# Patient Record
Sex: FEMALE | Race: WHITE | Employment: OTHER | URBAN - METROPOLITAN AREA
[De-identification: names, ages, dates, MRNs, and addresses within clinical notes are randomized per-mention and may not be internally consistent; named-entity substitution may affect disease eponyms.]

---

## 2017-04-07 ENCOUNTER — HOSPITAL ENCOUNTER (OUTPATIENT)
Age: 47
Setting detail: SPECIMEN
Discharge: HOME | End: 2017-04-07
Payer: MEDICARE

## 2017-04-07 LAB
-: ABNORMAL
ABSOLUTE EOS #: 0.3 K/UL (ref 0–0.4)
ABSOLUTE LYMPH #: 2 K/UL (ref 1–4.8)
ABSOLUTE MONO #: 0.3 K/UL (ref 0.1–1.2)
ALBUMIN SERPL-MCNC: 3.9 G/DL (ref 3.5–5.2)
ALBUMIN/GLOBULIN RATIO: 1.1 (ref 1–2.5)
ALP BLD-CCNC: 133 U/L (ref 35–104)
ALT SERPL-CCNC: 31 U/L (ref 5–33)
AMORPHOUS: ABNORMAL
ANION GAP SERPL CALCULATED.3IONS-SCNC: 13 MMOL/L (ref 9–17)
AST SERPL-CCNC: 27 U/L
BACTERIA: ABNORMAL
BASOPHILS # BLD: 1 % (ref 0–2)
BASOPHILS ABSOLUTE: 0 K/UL (ref 0–0.2)
BILIRUB SERPL-MCNC: 0.27 MG/DL (ref 0.3–1.2)
BILIRUBIN URINE: NEGATIVE
BUN BLDV-MCNC: 12 MG/DL (ref 6–20)
BUN/CREAT BLD: ABNORMAL (ref 9–20)
CALCIUM SERPL-MCNC: 8.8 MG/DL (ref 8.6–10.4)
CASTS UA: ABNORMAL /LPF (ref 0–2)
CHLORIDE BLD-SCNC: 102 MMOL/L (ref 98–107)
CHOLESTEROL/HDL RATIO: 4.8
CHOLESTEROL: 186 MG/DL
CO2: 24 MMOL/L (ref 20–31)
COLOR: YELLOW
COMMENT UA: ABNORMAL
CREAT SERPL-MCNC: 0.84 MG/DL (ref 0.5–0.9)
CREATININE URINE: 160.1 MG/DL (ref 28–217)
CRYSTALS, UA: ABNORMAL /HPF
DIFFERENTIAL TYPE: ABNORMAL
EOSINOPHILS RELATIVE PERCENT: 5 % (ref 1–4)
EPITHELIAL CELLS UA: ABNORMAL /HPF (ref 0–5)
GFR AFRICAN AMERICAN: >60 ML/MIN
GFR NON-AFRICAN AMERICAN: >60 ML/MIN
GFR SERPL CREATININE-BSD FRML MDRD: ABNORMAL ML/MIN/{1.73_M2}
GFR SERPL CREATININE-BSD FRML MDRD: ABNORMAL ML/MIN/{1.73_M2}
GLUCOSE BLD-MCNC: 113 MG/DL (ref 70–99)
GLUCOSE URINE: NEGATIVE
HCT VFR BLD CALC: 33.8 % (ref 36–46)
HDLC SERPL-MCNC: 39 MG/DL
HEMOGLOBIN: 11.3 G/DL (ref 12–16)
KETONES, URINE: NEGATIVE
LDL CHOLESTEROL: 109 MG/DL (ref 0–130)
LEUKOCYTE ESTERASE, URINE: ABNORMAL
LYMPHOCYTES # BLD: 31 % (ref 24–44)
MCH RBC QN AUTO: 28.2 PG (ref 26–34)
MCHC RBC AUTO-ENTMCNC: 33.3 G/DL (ref 31–37)
MCV RBC AUTO: 84.7 FL (ref 80–100)
MICROALBUMIN/CREAT 24H UR: 12 MG/L
MICROALBUMIN/CREAT UR-RTO: 7 MCG/MG CREAT
MONOCYTES # BLD: 5 % (ref 2–11)
MUCUS: ABNORMAL
NITRITE, URINE: NEGATIVE
OTHER OBSERVATIONS UA: ABNORMAL
PDW BLD-RTO: 14.2 % (ref 12.5–15.4)
PH UA: 5.5 (ref 5–8)
PLATELET # BLD: 310 K/UL (ref 140–450)
PLATELET ESTIMATE: ABNORMAL
PMV BLD AUTO: 7.7 FL (ref 6–12)
POTASSIUM SERPL-SCNC: 3.9 MMOL/L (ref 3.7–5.3)
PROTEIN UA: NEGATIVE
RBC # BLD: 4 M/UL (ref 4–5.2)
RBC # BLD: ABNORMAL 10*6/UL
RBC UA: ABNORMAL /HPF (ref 0–2)
RENAL EPITHELIAL, UA: ABNORMAL /HPF
SEG NEUTROPHILS: 58 % (ref 36–66)
SEGMENTED NEUTROPHILS ABSOLUTE COUNT: 3.8 K/UL (ref 1.8–7.7)
SODIUM BLD-SCNC: 139 MMOL/L (ref 135–144)
SPECIFIC GRAVITY UA: 1.02 (ref 1–1.03)
T3 FREE: 2.66 PG/ML (ref 2.02–4.43)
THYROXINE, FREE: 0.98 NG/DL (ref 0.93–1.7)
TOTAL PROTEIN: 7.3 G/DL (ref 6.4–8.3)
TRICHOMONAS: ABNORMAL
TRIGL SERPL-MCNC: 191 MG/DL
TSH SERPL DL<=0.05 MIU/L-ACNC: 5.69 MIU/L (ref 0.3–5)
TURBIDITY: ABNORMAL
URINE HGB: NEGATIVE
UROBILINOGEN, URINE: NORMAL
VLDLC SERPL CALC-MCNC: ABNORMAL MG/DL (ref 1–30)
WBC # BLD: 6.5 K/UL (ref 3.5–11)
WBC # BLD: ABNORMAL 10*3/UL
WBC UA: ABNORMAL /HPF (ref 0–5)
YEAST: ABNORMAL

## 2017-04-07 PROCEDURE — 83036 HEMOGLOBIN GLYCOSYLATED A1C: CPT

## 2017-04-07 PROCEDURE — 84481 FREE ASSAY (FT-3): CPT

## 2017-04-07 PROCEDURE — 84439 ASSAY OF FREE THYROXINE: CPT

## 2017-04-07 PROCEDURE — 85025 COMPLETE CBC W/AUTO DIFF WBC: CPT

## 2017-04-07 PROCEDURE — 82043 UR ALBUMIN QUANTITATIVE: CPT

## 2017-04-07 PROCEDURE — 80053 COMPREHEN METABOLIC PANEL: CPT

## 2017-04-07 PROCEDURE — 84443 ASSAY THYROID STIM HORMONE: CPT

## 2017-04-07 PROCEDURE — 82570 ASSAY OF URINE CREATININE: CPT

## 2017-04-07 PROCEDURE — 81001 URINALYSIS AUTO W/SCOPE: CPT

## 2017-04-07 PROCEDURE — 36415 COLL VENOUS BLD VENIPUNCTURE: CPT

## 2017-04-07 PROCEDURE — 80061 LIPID PANEL: CPT

## 2017-04-10 LAB
ESTIMATED AVERAGE GLUCOSE: 163 MG/DL
HBA1C MFR BLD: 7.3 % (ref 4–6)

## 2017-05-22 ENCOUNTER — APPOINTMENT (OUTPATIENT)
Dept: GENERAL RADIOLOGY | Age: 47
End: 2017-05-22
Payer: MEDICARE

## 2017-05-22 ENCOUNTER — HOSPITAL ENCOUNTER (EMERGENCY)
Age: 47
Discharge: HOME | End: 2017-05-22
Attending: EMERGENCY MEDICINE
Payer: MEDICARE

## 2017-05-22 VITALS
RESPIRATION RATE: 18 BRPM | HEART RATE: 92 BPM | OXYGEN SATURATION: 96 % | SYSTOLIC BLOOD PRESSURE: 127 MMHG | TEMPERATURE: 97 F | DIASTOLIC BLOOD PRESSURE: 85 MMHG

## 2017-05-22 DIAGNOSIS — R11.2 NON-INTRACTABLE VOMITING WITH NAUSEA, UNSPECIFIED VOMITING TYPE: Primary | ICD-10-CM

## 2017-05-22 DIAGNOSIS — R19.7 DIARRHEA, UNSPECIFIED TYPE: ICD-10-CM

## 2017-05-22 LAB
-: ABNORMAL
AMORPHOUS: ABNORMAL
BACTERIA: ABNORMAL
BILIRUBIN URINE: NEGATIVE
CASTS UA: ABNORMAL /LPF (ref 0–2)
CHP ED QC CHECK: YES
COLOR: ABNORMAL
COMMENT UA: ABNORMAL
CRYSTALS, UA: ABNORMAL /HPF
EPITHELIAL CELLS UA: ABNORMAL /HPF (ref 0–5)
GLUCOSE BLD-MCNC: 126 MG/DL
GLUCOSE BLD-MCNC: 126 MG/DL (ref 65–105)
GLUCOSE URINE: NEGATIVE
KETONES, URINE: NEGATIVE
LEUKOCYTE ESTERASE, URINE: ABNORMAL
MUCUS: ABNORMAL
NITRITE, URINE: NEGATIVE
OTHER OBSERVATIONS UA: ABNORMAL
PH UA: 5.5 (ref 5–8)
PROTEIN UA: ABNORMAL
RBC UA: ABNORMAL /HPF (ref 0–2)
RENAL EPITHELIAL, UA: ABNORMAL /HPF
SPECIFIC GRAVITY UA: 1.02 (ref 1–1.03)
TRICHOMONAS: ABNORMAL
TURBIDITY: ABNORMAL
URINE HGB: NEGATIVE
UROBILINOGEN, URINE: NORMAL
WBC UA: ABNORMAL /HPF (ref 0–5)
YEAST: ABNORMAL

## 2017-05-22 PROCEDURE — 81001 URINALYSIS AUTO W/SCOPE: CPT

## 2017-05-22 PROCEDURE — 71020 XR CHEST STANDARD TWO VW: CPT

## 2017-05-22 PROCEDURE — 87086 URINE CULTURE/COLONY COUNT: CPT

## 2017-05-22 PROCEDURE — 6360000002 HC RX W HCPCS: Performed by: STUDENT IN AN ORGANIZED HEALTH CARE EDUCATION/TRAINING PROGRAM

## 2017-05-22 PROCEDURE — 99284 EMERGENCY DEPT VISIT MOD MDM: CPT

## 2017-05-22 PROCEDURE — 82947 ASSAY GLUCOSE BLOOD QUANT: CPT

## 2017-05-22 RX ORDER — MELOXICAM 15 MG/1
15 TABLET ORAL DAILY
Status: ON HOLD | COMMUNITY
End: 2022-09-22 | Stop reason: HOSPADM

## 2017-05-22 RX ORDER — IBUPROFEN 800 MG/1
800 TABLET ORAL EVERY 6 HOURS PRN
COMMUNITY
End: 2022-07-13

## 2017-05-22 RX ORDER — ONDANSETRON 4 MG/1
4 TABLET, FILM COATED ORAL ONCE
Status: COMPLETED | OUTPATIENT
Start: 2017-05-22 | End: 2017-05-22

## 2017-05-22 RX ORDER — ONDANSETRON 4 MG/1
4 TABLET, FILM COATED ORAL EVERY 8 HOURS PRN
Qty: 8 TABLET | Refills: 0 | Status: SHIPPED | OUTPATIENT
Start: 2017-05-22 | End: 2022-10-21 | Stop reason: ALTCHOICE

## 2017-05-22 RX ORDER — SIMVASTATIN 40 MG
40 TABLET ORAL NIGHTLY
COMMUNITY

## 2017-05-22 RX ORDER — ACETAMINOPHEN 325 MG/1
650 TABLET ORAL EVERY 6 HOURS PRN
COMMUNITY

## 2017-05-22 RX ADMIN — ONDANSETRON 4 MG: 4 TABLET, FILM COATED ORAL at 10:35

## 2017-05-22 ASSESSMENT — ENCOUNTER SYMPTOMS
NAUSEA: 1
BLOOD IN STOOL: 0
VOMITING: 1
BACK PAIN: 0
SORE THROAT: 1
RHINORRHEA: 0
DIARRHEA: 1
ABDOMINAL DISTENTION: 0
WHEEZING: 0
PHOTOPHOBIA: 0
ABDOMINAL PAIN: 0
COUGH: 1
SHORTNESS OF BREATH: 0

## 2017-05-22 ASSESSMENT — PAIN DESCRIPTION - ORIENTATION: ORIENTATION: RIGHT

## 2017-05-22 ASSESSMENT — PAIN SCALES - GENERAL: PAINLEVEL_OUTOF10: 5

## 2017-05-22 ASSESSMENT — PAIN DESCRIPTION - LOCATION: LOCATION: ABDOMEN

## 2017-05-23 LAB
CULTURE: NORMAL
CULTURE: NORMAL
Lab: NORMAL
SPECIMEN DESCRIPTION: NORMAL
STATUS: NORMAL

## 2017-06-06 ENCOUNTER — HOSPITAL ENCOUNTER (OUTPATIENT)
Dept: PHYSICAL THERAPY | Age: 47
Setting detail: THERAPIES SERIES
Discharge: HOME | End: 2017-06-06
Payer: MEDICARE

## 2017-06-06 PROCEDURE — G8984 CARRY CURRENT STATUS: HCPCS

## 2017-06-06 PROCEDURE — 97162 PT EVAL MOD COMPLEX 30 MIN: CPT

## 2017-06-06 PROCEDURE — G8985 CARRY GOAL STATUS: HCPCS

## 2017-06-21 ENCOUNTER — HOSPITAL ENCOUNTER (OUTPATIENT)
Dept: PHYSICAL THERAPY | Age: 47
Setting detail: THERAPIES SERIES
Discharge: HOME | End: 2017-06-21
Payer: MEDICARE

## 2017-06-21 PROCEDURE — 97110 THERAPEUTIC EXERCISES: CPT

## 2017-06-21 PROCEDURE — 97140 MANUAL THERAPY 1/> REGIONS: CPT

## 2017-06-23 ENCOUNTER — HOSPITAL ENCOUNTER (OUTPATIENT)
Dept: PHYSICAL THERAPY | Age: 47
Setting detail: THERAPIES SERIES
Discharge: HOME | End: 2017-06-23
Payer: MEDICARE

## 2017-06-23 PROCEDURE — 97110 THERAPEUTIC EXERCISES: CPT

## 2017-06-23 PROCEDURE — 97140 MANUAL THERAPY 1/> REGIONS: CPT

## 2017-06-28 ENCOUNTER — HOSPITAL ENCOUNTER (OUTPATIENT)
Dept: PHYSICAL THERAPY | Age: 47
Setting detail: THERAPIES SERIES
Discharge: HOME | End: 2017-06-28
Payer: MEDICARE

## 2017-06-28 PROCEDURE — 97110 THERAPEUTIC EXERCISES: CPT

## 2017-06-28 PROCEDURE — 97140 MANUAL THERAPY 1/> REGIONS: CPT

## 2017-06-30 ENCOUNTER — HOSPITAL ENCOUNTER (OUTPATIENT)
Dept: PHYSICAL THERAPY | Age: 47
Setting detail: THERAPIES SERIES
Discharge: HOME | End: 2017-06-30
Payer: MEDICARE

## 2017-06-30 PROCEDURE — 97140 MANUAL THERAPY 1/> REGIONS: CPT

## 2017-06-30 PROCEDURE — 97110 THERAPEUTIC EXERCISES: CPT

## 2017-07-06 ENCOUNTER — HOSPITAL ENCOUNTER (OUTPATIENT)
Dept: PHYSICAL THERAPY | Age: 47
Setting detail: THERAPIES SERIES
Discharge: HOME | End: 2017-07-06
Payer: MEDICARE

## 2017-07-06 PROCEDURE — 97140 MANUAL THERAPY 1/> REGIONS: CPT

## 2017-07-06 PROCEDURE — 97110 THERAPEUTIC EXERCISES: CPT

## 2017-07-11 ENCOUNTER — HOSPITAL ENCOUNTER (OUTPATIENT)
Dept: PHYSICAL THERAPY | Age: 47
Setting detail: THERAPIES SERIES
Discharge: HOME | End: 2017-07-11
Payer: MEDICARE

## 2017-07-13 ENCOUNTER — HOSPITAL ENCOUNTER (OUTPATIENT)
Dept: PHYSICAL THERAPY | Age: 47
Setting detail: THERAPIES SERIES
Discharge: HOME | End: 2017-07-13
Payer: MEDICARE

## 2017-07-18 ENCOUNTER — HOSPITAL ENCOUNTER (OUTPATIENT)
Dept: PHYSICAL THERAPY | Age: 47
Setting detail: THERAPIES SERIES
Discharge: HOME | End: 2017-07-18
Payer: MEDICARE

## 2017-07-18 PROCEDURE — 97110 THERAPEUTIC EXERCISES: CPT

## 2017-07-18 PROCEDURE — 97140 MANUAL THERAPY 1/> REGIONS: CPT

## 2017-07-20 ENCOUNTER — HOSPITAL ENCOUNTER (OUTPATIENT)
Dept: PHYSICAL THERAPY | Age: 47
Setting detail: THERAPIES SERIES
Discharge: HOME | End: 2017-07-20
Payer: MEDICARE

## 2017-07-20 PROCEDURE — 97110 THERAPEUTIC EXERCISES: CPT

## 2017-07-20 PROCEDURE — 97140 MANUAL THERAPY 1/> REGIONS: CPT

## 2017-07-25 ENCOUNTER — HOSPITAL ENCOUNTER (OUTPATIENT)
Dept: PHYSICAL THERAPY | Age: 47
Setting detail: THERAPIES SERIES
Discharge: HOME | End: 2017-07-25
Payer: MEDICARE

## 2017-07-27 ENCOUNTER — HOSPITAL ENCOUNTER (OUTPATIENT)
Dept: PHYSICAL THERAPY | Age: 47
Setting detail: THERAPIES SERIES
Discharge: HOME | End: 2017-07-27
Payer: MEDICARE

## 2017-08-01 ENCOUNTER — HOSPITAL ENCOUNTER (OUTPATIENT)
Age: 47
Discharge: HOME | End: 2017-08-01
Payer: MEDICARE

## 2017-08-01 ENCOUNTER — HOSPITAL ENCOUNTER (OUTPATIENT)
Dept: GENERAL RADIOLOGY | Age: 47
Discharge: HOME | End: 2017-08-01
Payer: MEDICARE

## 2017-08-01 ENCOUNTER — HOSPITAL ENCOUNTER (OUTPATIENT)
Dept: PHYSICAL THERAPY | Age: 47
Setting detail: THERAPIES SERIES
Discharge: HOME | End: 2017-08-01
Payer: MEDICARE

## 2017-08-01 DIAGNOSIS — M25.512 CHRONIC LEFT SHOULDER PAIN: ICD-10-CM

## 2017-08-01 DIAGNOSIS — G89.29 CHRONIC LEFT SHOULDER PAIN: ICD-10-CM

## 2017-08-01 PROCEDURE — 97110 THERAPEUTIC EXERCISES: CPT

## 2017-08-01 PROCEDURE — 73030 X-RAY EXAM OF SHOULDER: CPT

## 2017-08-01 PROCEDURE — 97140 MANUAL THERAPY 1/> REGIONS: CPT

## 2017-08-03 ENCOUNTER — HOSPITAL ENCOUNTER (OUTPATIENT)
Dept: PHYSICAL THERAPY | Age: 47
Setting detail: THERAPIES SERIES
Discharge: HOME | End: 2017-08-03
Payer: MEDICARE

## 2017-08-04 ENCOUNTER — HOSPITAL ENCOUNTER (OUTPATIENT)
Dept: PHYSICAL THERAPY | Age: 47
Setting detail: THERAPIES SERIES
Discharge: HOME | End: 2017-08-04
Payer: MEDICARE

## 2017-08-04 PROCEDURE — 97110 THERAPEUTIC EXERCISES: CPT

## 2017-08-04 PROCEDURE — 97140 MANUAL THERAPY 1/> REGIONS: CPT

## 2017-08-08 ENCOUNTER — HOSPITAL ENCOUNTER (OUTPATIENT)
Dept: PHYSICAL THERAPY | Age: 47
Setting detail: THERAPIES SERIES
Discharge: HOME | End: 2017-08-08
Payer: MEDICARE

## 2017-08-10 ENCOUNTER — HOSPITAL ENCOUNTER (OUTPATIENT)
Dept: PHYSICAL THERAPY | Age: 47
Setting detail: THERAPIES SERIES
Discharge: HOME | End: 2017-08-10
Payer: MEDICARE

## 2017-08-15 ENCOUNTER — APPOINTMENT (OUTPATIENT)
Dept: PHYSICAL THERAPY | Age: 47
End: 2017-08-15
Payer: MEDICARE

## 2017-11-03 ENCOUNTER — APPOINTMENT (OUTPATIENT)
Dept: GENERAL RADIOLOGY | Age: 47
End: 2017-11-03
Payer: COMMERCIAL

## 2017-11-03 ENCOUNTER — HOSPITAL ENCOUNTER (EMERGENCY)
Age: 47
Discharge: HOME | End: 2017-11-03
Attending: EMERGENCY MEDICINE
Payer: COMMERCIAL

## 2017-11-03 VITALS
BODY MASS INDEX: 44.96 KG/M2 | RESPIRATION RATE: 18 BRPM | HEIGHT: 59 IN | HEART RATE: 108 BPM | OXYGEN SATURATION: 98 % | SYSTOLIC BLOOD PRESSURE: 118 MMHG | DIASTOLIC BLOOD PRESSURE: 81 MMHG | TEMPERATURE: 97.9 F | WEIGHT: 223 LBS

## 2017-11-03 DIAGNOSIS — T18.5XXA FOREIGN BODY OF RECTUM, INITIAL ENCOUNTER: Primary | ICD-10-CM

## 2017-11-03 DIAGNOSIS — K59.09 OTHER CONSTIPATION: ICD-10-CM

## 2017-11-03 PROCEDURE — 6370000000 HC RX 637 (ALT 250 FOR IP): Performed by: NURSE PRACTITIONER

## 2017-11-03 PROCEDURE — 74000 XR ABDOMEN LIMITED (KUB): CPT

## 2017-11-03 PROCEDURE — 99283 EMERGENCY DEPT VISIT LOW MDM: CPT

## 2017-11-03 RX ORDER — SODIUM PHOSPHATE, DIBASIC AND SODIUM PHOSPHATE, MONOBASIC 7; 19 G/133ML; G/133ML
1 ENEMA RECTAL ONCE
Status: COMPLETED | OUTPATIENT
Start: 2017-11-03 | End: 2017-11-03

## 2017-11-03 RX ORDER — DOCUSATE SODIUM 100 MG/1
100 CAPSULE, LIQUID FILLED ORAL 2 TIMES DAILY
Qty: 14 CAPSULE | Refills: 0 | Status: SHIPPED | OUTPATIENT
Start: 2017-11-03

## 2017-11-03 RX ORDER — HYDROCODONE BITARTRATE AND ACETAMINOPHEN 5; 325 MG/1; MG/1
2 TABLET ORAL ONCE
Status: COMPLETED | OUTPATIENT
Start: 2017-11-03 | End: 2017-11-03

## 2017-11-03 RX ADMIN — HYDROCODONE BITARTRATE AND ACETAMINOPHEN 2 TABLET: 5; 325 TABLET ORAL at 14:56

## 2017-11-03 RX ADMIN — SODIUM PHOSPHATE 1 ENEMA: 7; 19 ENEMA RECTAL at 14:56

## 2017-11-03 ASSESSMENT — PAIN SCALES - GENERAL
PAINLEVEL_OUTOF10: 5
PAINLEVEL_OUTOF10: 10

## 2017-11-03 ASSESSMENT — ENCOUNTER SYMPTOMS
NAUSEA: 0
ABDOMINAL PAIN: 0
CONSTIPATION: 1
VOMITING: 0

## 2017-11-03 NOTE — ED PROVIDER NOTES
[facility]  Emergency Department Encounter  Mid Level Provider     Pt Name: Candace He  MRN: [id number]  [place] 1970  Date of evaluation: 11/3/17  PCP:  Mile Bonner MD    CHIEF COMPLAINT       Chief Complaint   Patient presents with    Constipation     normal stool x2 days       HISTORY OF PRESENT ILLNESS  (Location/Symptom, Timing/Onset, Context/Setting, Quality, Duration, Modifying Factors, Severity.)      Candace He is a 49 y.o. female who presents with Report of constipation. Patient reports she ate a bowl of pumpkin seeds 2 days ago and now are stuck in her rectum. Patient reports she cannot pass normal stool due to this. Patient complains of rectal pain. Patient denies any abdominal pain. Patient has any fever or chills. PAST MEDICAL / SURGICAL / SOCIAL / FAMILY HISTORY      has a past medical history of Arthritis; Diabetes mellitus ([address] Hypertension; and Obese.     has a past surgical history that includes hernia repair and Tubal ligation. Social History     Social History    Marital status: Single     Spouse name: N/A    Number of children: N/A    Years of education: N/A     Occupational History    Not on file. Social History Main Topics    Smoking status: Former Smoker    Smokeless tobacco: Never Used    Alcohol use No    Drug use: No    Sexual activity: Not on file     Other Topics Concern    Not on file     Social History Narrative    No narrative on file       History reviewed. No pertinent family history. Allergies:  Codeine and Percocet [oxycodone-acetaminophen]    Home Medications:  Prior to Admission medications    Medication Sig Start Date End Date Taking?  Authorizing Provider   docusate sodium (COLACE) 100 MG capsule Take 1 capsule by mouth 2 times daily 11/3/17  Yes Dena Schmitz CNP   simvastatin (ZOCOR) 40 MG tablet Take 40 mg by mouth nightly   Yes Historical Provider, MD   meloxicam (MOBIC) 15 MG tablet Take 15 mg by mouth She is oriented to person, place, and time. She appears well-developed and well-nourished. No distress. Very obese   HENT:   Head: Normocephalic and atraumatic. Eyes: Conjunctivae are normal. Pupils are equal, round, and reactive to light. Neck: Normal range of motion. Neck supple. Cardiovascular: Normal rate and regular rhythm. Pulmonary/Chest: Effort normal. No respiratory distress. Abdominal: Soft. She exhibits no distension and no mass (no pulstile masses palpated). There is no tenderness. There is no rebound and no guarding. Neurological: She is alert and oriented to person, place, and time. Skin: Skin is warm and dry. Psychiatric: She has a normal mood and affect. Her behavior is normal. Judgment and thought content normal.   Nursing note and vitals reviewed. DIFFERENTIAL  DIAGNOSIS   Pumpkin seed impaction in rectum, constipation    PLAN (LABS / IMAGING / EKG):  Orders Placed This Encounter   Procedures    XR ABDOMEN (KUB) (SINGLE AP VIEW)    Vital signs       MEDICATIONS ORDERED:  Orders Placed This Encounter   Medications    fleet rectal enema 1 enema    HYDROcodone-acetaminophen (NORCO) 5-325 MG per tablet 2 tablet    docusate sodium (COLACE) 100 MG capsule     Sig: Take 1 capsule by mouth 2 times daily     Dispense:  14 capsule     Refill:  0       Controlled Substances Monitoring:      DIAGNOSTIC RESULTS / EMERGENCY DEPARTMENT COURSE / [facility]     RADIOLOGY:   I directly visualized (with the attending physician) the following  images and reviewed the radiologist interpretations:  No results found. XR ABDOMEN (KUB) (SINGLE AP VIEW)   Final Result   1. Mild stool burden. 2. Nonspecific favoring nonobstructive bowel-gas pattern. LABS:  No results found for this visit on 11/03/17. EMERGENCY DEPARTMENT COURSE:  Approximately one cup of pumpkin seeds palpated with digital exam of rectum. Patient disimpacted digitally with intermittent use of an enema.   Patient unable to assist much with pushing out the impaction due to deconditioning and obesity. The total disimpaction time was over the course of a 2 hour period with approximately a total of 2 cups of whole seeds removed  1620: Liang Payment provided in no further seeds palpated in the rectal vault    CONSULTS:  None    PROCEDURES:  None    FINAL IMPRESSION      1. Foreign body of rectum, initial encounter    2.  Other constipation          DISPOSITION / PLAN     DISPOSITION     PATIENT REFERRED TO:  Dalila Shook MD  [address] [facility] 59[address]  [facility]  [phone]    In 3 days        DISCHARGE MEDICATIONS:  Discharge Medication List as of 11/3/2017  6:08 PM      START taking these medications    Details   docusate sodium (COLACE) 100 MG capsule Take 1 capsule by mouth 2 times daily, Disp-14 capsule, R-0Print             Jose Tse CNP   Emergency Medicine Nurse Practitioner    (Please note that portions of this note were completed with a voice recognition program.  Efforts were made to edit the dictations but occasionally words are mis-transcribed.)        Jose Tse CNP  11/03/17 7625 Richmondvivian Trammell MD  11/04/17 2594

## 2017-11-03 NOTE — ED PROVIDER NOTES
FACULTY SIGN-OUT  ADDENDUM     Care of this patient was assumed from previous attending physician. The patient was seen for Constipation (normal stool x2 days)  . The patient's initial evaluation and plan have been discussed with the prior provider who initially evaluated the patient. ED COURSE      The patient was given the following medications:  Orders Placed This Encounter   Medications    fleet rectal enema 1 enema    HYDROcodone-acetaminophen (NORCO) 5-325 MG per tablet 2 tablet       RECENT VITALS:   Temp: 97.9 °F (36.6 °C), Pulse: 120, Resp: 18, BP: (!) 152/109    MEDICAL DECISION MAKING        Marilyn Ayala is a 49 y.o. female who presents to the Emergency Department with complaints of Constipation. The patient has had rectal disimpaction by the nurse practitioner. Nestor Mason M.D., [facility]  Emergency Medicine Attending         Verlan Romberg, MD  11/03/17 0679

## 2017-11-03 NOTE — ED NOTES
Awaiting xray for update plan of care. Friend at bedside. Pt denies any needs. Linens changed.  Will continue to GARDENIA Chu, RN  11/03/17 7319

## 2017-11-04 ASSESSMENT — ENCOUNTER SYMPTOMS
BACK PAIN: 0
SORE THROAT: 0
SHORTNESS OF BREATH: 0

## 2018-03-27 ENCOUNTER — HOSPITAL ENCOUNTER (OUTPATIENT)
Dept: MAMMOGRAPHY | Age: 48
Discharge: HOME | End: 2018-03-29
Payer: COMMERCIAL

## 2018-03-27 DIAGNOSIS — Z12.39 BREAST CANCER SCREENING: ICD-10-CM

## 2018-03-27 PROCEDURE — 77067 SCR MAMMO BI INCL CAD: CPT

## 2018-12-20 ENCOUNTER — HOSPITAL ENCOUNTER (OUTPATIENT)
Age: 48
Discharge: HOME | End: 2018-12-22
Payer: COMMERCIAL

## 2018-12-20 ENCOUNTER — HOSPITAL ENCOUNTER (OUTPATIENT)
Dept: GENERAL RADIOLOGY | Age: 48
Discharge: HOME | End: 2018-12-22
Payer: COMMERCIAL

## 2018-12-20 DIAGNOSIS — M17.0 OSTEOARTHRITIS OF BOTH KNEES, UNSPECIFIED OSTEOARTHRITIS TYPE: ICD-10-CM

## 2018-12-20 PROCEDURE — 73560 X-RAY EXAM OF KNEE 1 OR 2: CPT

## 2019-03-28 DIAGNOSIS — M25.562 PAIN IN BOTH KNEES, UNSPECIFIED CHRONICITY: Primary | ICD-10-CM

## 2019-03-28 DIAGNOSIS — M25.561 PAIN IN BOTH KNEES, UNSPECIFIED CHRONICITY: Primary | ICD-10-CM

## 2019-03-28 DIAGNOSIS — M25.511 BILATERAL SHOULDER PAIN, UNSPECIFIED CHRONICITY: ICD-10-CM

## 2019-03-28 DIAGNOSIS — M25.512 BILATERAL SHOULDER PAIN, UNSPECIFIED CHRONICITY: ICD-10-CM

## 2019-04-03 ENCOUNTER — OFFICE VISIT (OUTPATIENT)
Dept: ORTHOPEDIC SURGERY | Age: 49
End: 2019-04-03
Payer: COMMERCIAL

## 2019-04-03 DIAGNOSIS — M75.42 SHOULDER IMPINGEMENT, LEFT: ICD-10-CM

## 2019-04-03 DIAGNOSIS — E66.9 OBESITY PEDS (BMI >=95 PERCENTILE): ICD-10-CM

## 2019-04-03 DIAGNOSIS — M25.561 PAIN IN BOTH KNEES, UNSPECIFIED CHRONICITY: Primary | ICD-10-CM

## 2019-04-03 DIAGNOSIS — M25.562 PAIN IN BOTH KNEES, UNSPECIFIED CHRONICITY: Primary | ICD-10-CM

## 2019-04-03 PROCEDURE — 99213 OFFICE O/P EST LOW 20 MIN: CPT | Performed by: STUDENT IN AN ORGANIZED HEALTH CARE EDUCATION/TRAINING PROGRAM

## 2019-04-03 PROCEDURE — 1036F TOBACCO NON-USER: CPT | Performed by: STUDENT IN AN ORGANIZED HEALTH CARE EDUCATION/TRAINING PROGRAM

## 2019-04-03 PROCEDURE — 3017F COLORECTAL CA SCREEN DOC REV: CPT | Performed by: STUDENT IN AN ORGANIZED HEALTH CARE EDUCATION/TRAINING PROGRAM

## 2019-04-03 PROCEDURE — G8427 DOCREV CUR MEDS BY ELIG CLIN: HCPCS | Performed by: STUDENT IN AN ORGANIZED HEALTH CARE EDUCATION/TRAINING PROGRAM

## 2019-04-03 PROCEDURE — G8421 BMI NOT CALCULATED: HCPCS | Performed by: STUDENT IN AN ORGANIZED HEALTH CARE EDUCATION/TRAINING PROGRAM

## 2019-04-06 NOTE — PROGRESS NOTES
[facility]  [facility]  [address] [place], [place] [address] [address] [place] [address]  Dept: [phone]    Ambulatory Orthopedic Office Visit      CHIEF COMPLAINT:    Chief Complaint   Patient presents with    Knee Pain     bilateral L>R    Shoulder Pain     left      HISTORY OF PRESENT ILLNESS:    The patient is a 47 y.o. female who is being seen at the request of Ulysses Emerson MD for consultation and evaluation of  Bilateral knee pain and left shoulder pain. Her knee pain has been present for a few years. Not one side is worse than the other. She admits to anterior and medial sided knee pain. She has done therapy in the past for her prior bilateral shoulders and neck pain, which helped. Denies any new injuries. Denies numbness, tingling, fevers, chills, shortness of breath, or chest pain.        Past Medical History:    Past Medical History:   Diagnosis Date    Arthritis     Diabetes mellitus ([address]     Hypertension     Obese      Past Surgical History:    Past Surgical History:   Procedure Laterality Date    HERNIA REPAIR      TUBAL LIGATION       Current Medications:   Current Outpatient Medications   Medication Sig Dispense Refill    docusate sodium (COLACE) 100 MG capsule Take 1 capsule by mouth 2 times daily 14 capsule 0    simvastatin (ZOCOR) 40 MG tablet Take 40 mg by mouth nightly      meloxicam (MOBIC) 15 MG tablet Take 15 mg by mouth daily      aspirin 81 MG tablet Take 81 mg by mouth daily      ibuprofen (ADVIL;MOTRIN) 800 MG tablet Take 800 mg by mouth every 6 hours as needed for Pain      acetaminophen (TYLENOL) 325 MG tablet Take 650 mg by mouth every 6 hours as needed for Pain      ondansetron (ZOFRAN) 4 MG tablet Take 1 tablet by mouth every 8 hours as needed for Nausea 8 tablet 0    ondansetron (ZOFRAN) 4 MG tablet Take 1 tablet by mouth every 8 hours as needed for Nausea 4 tablet 0    lisinopril-hydrochlorothiazide (PRINZIDE;ZESTORETIC) 20-12.5 MG per tablet Take 1 tablet by mouth daily      benzonatate (TESSALON) 100 MG capsule Take 1 capsule by mouth 3 times daily as needed for Cough 20 capsule 0    METFORMIN HCL PO Take 1,000 mg by mouth 2 times daily.  LISINOPRIL PO Take 20 mg by mouth daily. No current facility-administered medications for this visit. Allergies:    Codeine and Percocet [oxycodone-acetaminophen]  Social History:   Social History     Socioeconomic History    Marital status: Single     Spouse name: Not on file    Number of children: Not on file    Years of education: Not on file    Highest education level: Not on file   Occupational History    Not on file   Social Needs    Financial resource strain: Not on file    Food insecurity:     Worry: Not on file     Inability: Not on file    Transportation needs:     Medical: Not on file     Non-medical: Not on file   Tobacco Use    Smoking status: Former Smoker    Smokeless tobacco: Never Used   Substance and Sexual Activity    Alcohol use: No    Drug use: No    Sexual activity: Not on file   Lifestyle    Physical activity:     Days per week: Not on file     Minutes per session: Not on file    Stress: Not on file   Relationships    Social connections:     Talks on phone: Not on file     Gets together: Not on file     Attends Orthodox service: Not on file     Active member of club or organization: Not on file     Attends meetings of clubs or organizations: Not on file     Relationship status: Not on file    Intimate partner violence:     Fear of current or ex partner: Not on file     Emotionally abused: Not on file     Physically abused: Not on file     Forced sexual activity: Not on file   Other Topics Concern    Not on file   Social History Narrative    Not on file     Family History:  No family history on file. REVIEW OF SYSTEMS:  Constitutional: Negative for fever and chills. HENT: Negative forcongestion or drainage   Eyes: Negative for blurred vision and double vision. Respiratory: Negative for cough, shortness of breath and wheezing. Cardiovascular: Negative for chest pain and palpitations. Gastrointestinal: Negative for nausea. Negative for vomiting. Musculoskeletal: Positive for myalgias and joint pain. Skin: Negative for itching and rash. Neurological: Negative for dizziness, sensory change and headaches. Psychiatric/Behavioral: Negative for depression andsuicidal ideas. PHYSICAL EXAM:  There were no vitals taken for this visit. General: Morro Rice is a 47 y.o. female who is alert and oriented and sitting comfortably in our office. Neuro: Alert and oriented. Eyes: Extra-ocular muscles intact. Mouth: Oral mucosa moist. No perioral lesions. Pulm: Respirations unlabored and regular. Skin: Warm, well perfused. Psych: Patient has good fund of knowledge and displays understanging of exam, diagnosis, and plan. Ortho Exam:  MS:  LUE: flexion 140, abd 100, + greenberg, + neers, ER 60, +ABER, Tender over posterior delt. Compartments soft. Median/Radial/Ulnar/AIN/PIN motor intact. Median/Radial/Ulnar nerve sensation intact to light touch. 2+ rad pulse. BLE: +Mcmurrays to pain, +patella grind, +medial joint line tendreness, +med/lat patella facet tenderness, + femoral condyle tenderness. Stable varus/vaglus. Stable lachman, stable ant/post drawer. Compartments soft and compressible. TA/EHL/FHL/GS motor intact. Deep and Superficial Peroneal/Saphenous/Sural SILT. 2+ DP pulse. RADIOLOGY:   History:  1. Right knee pain  2. Left knee pain  3. Left shoulder pain    Comparison:  None    Findings:  1. 4 views of the right knee demonstrate significant medial joint space narrowing, sclerosis, osteophyte formation. Patella osteophytes. Consistent with severe OA. 2. 4 view left knee demonstrate significant medial joint space narrowing, sclerosis, osteophyte formation. Patella osteophytes. Consistent with severe OA.    3. 4 views left shoulder demonstrate some AC joint degenerative changes. Mild glenoid changes. Impression:  1. Right knee severe OA  2. Left knee severe OA  3. AC joint degerenation. Mild glenoid changes. Assessment/Plan:   Kait Krause is a 47y.o. year old female bilateral knee OA  Left shoulder impingement and tendonitis    Plan at this time is to continue non operative management for her knees. Patient has severe OA in bilateral knees, however she we would like to optimize patient medically and her weight prior to knee replacement surgery  Provided referral to weight management for assistance to reach the goal of a BMI of 40, currently 45. Continue weight loss and exercise at this time  Recommend follow up with primary care for Hemoglobin A1C check  Will discuss corticosteroid shots.    Prescribed physical therapy for shoulder  Tylenol/NSAIDS PRN  Follow up 6 weeks    ----------------------------------------  Ramone Cleaning DO  PGY-1, Department of [address], [place], [place]

## 2019-04-26 ENCOUNTER — HOSPITAL ENCOUNTER (OUTPATIENT)
Dept: PHYSICAL THERAPY | Age: 49
Setting detail: THERAPIES SERIES
Discharge: HOME | End: 2019-04-26
Payer: COMMERCIAL

## 2019-04-26 PROCEDURE — 97162 PT EVAL MOD COMPLEX 30 MIN: CPT

## 2019-04-26 NOTE — FLOWSHEET NOTE
[facility] Fall Risk Assessment    Patient Name:  Mary Ellen Wilson  : 1970        Risk Factor Scale  Score   History of Falls [x] Yes  [] No 25  0 25   Secondary Diagnosis [] Yes  [x] No 15  0 0   Ambulatory Aid [] Furniture  [] Crutches/cane/walker  [x] None/bedrest/wheelchair/nurse 30  15  0 0   IV/Heparin Lock [] Yes  [x] No 20  0 0   Gait/Transferring [] Impaired  [x] Weak  [] Normal/bedrest/immobile 20  10  0 10   Mental Status [] Forgets limitations  [x] Oriented to own ability 15  0 0      Total: 35     Based on the Assessment score: check the appropriate box.     []  No intervention needed   Low =   Score of 0-24    [x]  Use standard prevention interventions Moderate =  Score of 24-44   [x] Give patient handout and discuss fall prevention strategies   [x] Establish goal of education for patient/family RE: fall prevention strategies    []  Use high risk prevention interventions High = Score of 45 and higher   [] Give patient handout and discuss fall prevention strategies   [] Establish goal of education for patient/family Re: fall prevention strategies   [] Discuss lifeline / other resources    Electronically signed by:   Libra Sepulveda PT  Date: 2019

## 2019-04-26 NOTE — CONSULTS
[x] [facility]  [address]  [phone]  F: [phone]        Physical Therapy Upper Extremity Evaluation    Date:  2019  Patient: Jaden Rondon  : 1970  MRN: [id number]  Physician: Kane Watson     Insurance: [employer] (12 Rx )  Medical Diagnosis: L Shoulder Impingement M75.42    Rehab Codes: B96.484, M25.612, M79.1, R29.3  Onset Date: 2019   Next 's appt: none scheduled    Subjective:   CC: Pt reports she fell a few years ago, injured L shoulder, Pain has increased over last 6 months, worse recently. Pt reports shoulder is sore all the time, it hurts everytime she moves it, she has a \"locked\" shoulder. Pain increases with cold, housework (sweepping, mopping), cooking, and getting out of bathtub, up to 7/10. Shoulder has been really sore, and tight last few weeks. Pt reports she will lose control of her hands sometimes when she is cooking. Originally in shoulder, recently now up towards neck. Pain decreases w/laying L side w/pillow rolled up under neck. Heat helps for short time only. Therapy in past helped. HPI: (onset date) 2019    PMHx: [] Unremarkable [x] Diabetes [x] HTN  [] Pacemaker   [] MI/Heart Problems [] Cancer [x] Arthritis   [x] Other: obesity, tubal ligation, hernia repair, Knee OA (needs TKR)              [x] Refer to full medical chart  In EPIC   Tests: [x] X-Ray: (from Ortho note) 4 views left shoulder demonstrate some AC joint degenerative changes. Mild glenoid changes.     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  [] Retired    [] Not Employed    []  Disability  [] Other:             Return to work:   [employer] Farah Description:    Pain:  [x] Yes  [] No Location: L shoulder, upper trap Pain Rating: (0-10 scale) 5/10  Pain altered Tx:  [x] Yes  [] No  Action:  Symptoms:  [] Improving [x] Worsening [] Same    Sleep: [] OK    [x] Disturbed-lays L side    Objective:     ROM  °A/P END FEEL STRENGTH TESTS (+/-) Left Right Not Tested    Left Right  Left Right Drop Arm   []   Sit Shld Flex      Sulcus Sign   []   Sit Shld Abd      Apprehension   []   Sit Shld IR      Nelis -  []   Shoulder Flex 148°p 147°  3p 4- Speeds +  []   Ext      Neer +  []   ABD 97°p 137°  3+p 4- Arce  +  []   ER @  90 75°p 75°    Painful Arc   []   IR 77°p 84°    Tinel   []   Supraspinatus            Infraspinatus            Serratus Ant            Pectoralis            Lats            Mid Trap            Lower Trap            Elbow Flex. 4-p 4+       Elbow Ext. 4-p 4+       Pronation            Supination            Wrist Flex. Wrist Ext. Rad. Dev. Ulnar Dev.                 OBSERVATION No Deficit Deficit Not Tested Comments   Forward Head [] [x] []    Rounded Shoulders [] [x] []    Kyphosis [x] [] []    Scap Height/Position [x] [] []    Winging [x] [] []    SH Rhythm [x] [] []    INSPECTION/PALPATION    Pt very tender entire shoulder, scapula, neck   SC/AC Joint [] [x] []    Supraspinatus [] [x] []    Biceps tendon/groove [] [x] []    Posterior shld [] [x] []    Subscapularis [] [x] []    NEUROLOGICAL       Cervical ROM/Quadrant [] [x] [] WFL all planes, painful flex, B rot   Reflexes [] [] [x]    Compression/Distraction [x] [] []    Sensation [] [x] [] Burning L upper trap, supraspin         Comments:    Assessment:  Problems:    [x] ? Pain: L Shoulder 5-/10 this date, up to 7/10  [x] ? ROM: L Shoulder  [x] ? Strength: L shoulder, elbow  [x] ?  Function: UEFS 41% loss of UE function  [] Other:    STG: (to be met in 10 treatments)  1. ? Pain: L shoulder 3/10 average, 5/10 at worst  2. ? ROM: L shoulder flex 158°, abd 115°, IR 82°  3. ? Strength:L elbow 4+/5, shoulder 4-/5  4. ? Function:UEFS 41% loss of UE function  5. Independent with Home Exercise Programs  6. Demonstrate Knowledge of fall prevention                   Patient goals: shoulder feel better    Rehab Potential:  [x] Good  [] Fair  [] Poor   Suggested Professional Referral:  [x] No  [] Yes:  Barriers to Goal Achievement:  [x] No  [] Yes:  Domestic Concerns:  [x] No  [] Yes:    Pt. Education:  [x] Plans/Goals, Risks/Benefits discussed  [] Home exercise program  Method of Education: [x] Verbal  [] Demo  [] Written  Comprehension of Education:  [x] Verbalizes understanding. [] Demonstrates understanding. [] Needs Review. [] Demonstrates/verbalizes understanding of HEP/Ed previously given. Treatment Plan:  [x] Therapeutic Exercise  [x] Modalities:  [] Dry Needling  [x] Therapeutic Activity  [x] Ultrasound  [x] Electrical Stimulation  [] Gait Training   [x] Massage       [] Lumbar/Cervical Traction  [] Neuromuscular Re-education [x] Cold/hotpack [x] Iontophoresis: 4 mg/mL  [x] Instruction in HEP             Dexamethasone Sodium  [x] Manual Therapy             Phosphate  mAmin  [] Aquatic Therapy                   [] Vasocompression/    [] Other:            Game Ready   [x]  Medication allergies reviewed for use of    Dexamethasone Sodium Phosphate 4mg/ml     with iontophoresis treatments. Pt is not allergic.        Frequency: 2 x/week for 10 visits    Todays Treatment:  Modalities:   Precautions:  Exercises:  Exercise Reps/ Time Weight/ Level Comments                                 Other:    Specific Instructions for next treatment: begin shoulder ex including UBE, post shoulder rolls, scap retractions, upper trap stretch; add HP ES for symptom control, postural ed      Evaluation Complexity:  History (Personal factors, comorbidities) [] 0 [] 1-2 [x] 3+   Exam (limitations, restrictions) [] 1-2 [] 3 [x] 4+   Clinical presentation (progression) [] Stable [x] Evolving  [] Unstable   Decision Making [] Low [x] Moderate [] High    [] Low Complexity [x] Moderate Complexity [] High Complexity       Treatment Charges: Mins Units   [x] Evaluation       []  Low       [x]  Moderate       []  High 41 1   []  Modalities     []  Ther Exercise     []  Manual Therapy     []  Ther Activities     []  Aquatics     []  Vasocompression     []  Other       TOTAL TREATMENT TIME: 41 min    Time in: 1104    Time Out: 0188    Electronically signed by: Chelle Salcido PT          Physician Signature:________________________________Date:__________________  By signing above or cosigning this note, I have reviewed this plan of care and certify a need for medically necessary rehabilitation services.      *PLEASE SIGN ABOVE AND FAX BACK ALL PAGES*

## 2022-07-11 DIAGNOSIS — M25.562 PAIN IN BOTH KNEES, UNSPECIFIED CHRONICITY: Primary | ICD-10-CM

## 2022-07-11 DIAGNOSIS — M25.561 PAIN IN BOTH KNEES, UNSPECIFIED CHRONICITY: Primary | ICD-10-CM

## 2022-07-13 ENCOUNTER — OFFICE VISIT (OUTPATIENT)
Dept: ORTHOPEDIC SURGERY | Age: 52
End: 2022-07-13
Payer: COMMERCIAL

## 2022-07-13 VITALS — WEIGHT: 223 LBS | RESPIRATION RATE: 16 BRPM | HEIGHT: 59 IN | BODY MASS INDEX: 44.96 KG/M2

## 2022-07-13 DIAGNOSIS — M17.0 BILATERAL PRIMARY OSTEOARTHRITIS OF KNEE: Primary | ICD-10-CM

## 2022-07-13 PROCEDURE — 99203 OFFICE O/P NEW LOW 30 MIN: CPT | Performed by: STUDENT IN AN ORGANIZED HEALTH CARE EDUCATION/TRAINING PROGRAM

## 2022-07-13 RX ORDER — MELOXICAM 15 MG/1
15 TABLET ORAL DAILY
Qty: 30 TABLET | Refills: 3 | Status: SHIPPED | OUTPATIENT
Start: 2022-07-13 | End: 2022-09-22

## 2022-07-22 DIAGNOSIS — R52 PAIN: Primary | ICD-10-CM

## 2022-07-25 ENCOUNTER — HOSPITAL ENCOUNTER (OUTPATIENT)
Dept: PHYSICAL THERAPY | Age: 52
Setting detail: THERAPIES SERIES
Discharge: HOME | End: 2022-07-25
Payer: COMMERCIAL

## 2022-07-25 PROCEDURE — 97110 THERAPEUTIC EXERCISES: CPT

## 2022-07-25 PROCEDURE — 97161 PT EVAL LOW COMPLEX 20 MIN: CPT

## 2022-07-25 NOTE — CONSULTS
[x] [facility] &  Therapy  [address]  [phone]  F: [phone] [] [address]  [place] [address]   [address]  P: [phone]  F: [phone] [] [place]  [address]  P: [phone]  F: [phone] [] [address]  P: [phone]  F: [phone] [] [address]  [facility]   [address]   [place]: [phone]  F: [phone]      Physical Therapy Lower Extremity Evaluation    Date:  2022  Patient: Gonzalez Denny    : 1970  MRN: [id number]  Physician: Dr. Kumar Cisse: Kev Hankins ([facility])   Medical Diagnosis: Bilateral primary osteoarthritis of knee (M17.0 [ICD-10-CM])    Rehab Codes: M17.0, M62.81, R26.89, M79.604, M79.605, M62.838  Onset date: referral 22    Next 's appt.: 22    Subjective:   CC/HPI: Patient is a 48 y/o female presenting with bilateral knee pain that has been going on for a few years. Uses wheelchair for long distance mobility. Bilateral knee injections completed on 22. Complaints of bilateral LE cramping. Reports that she walks distances of about 4-5 blocks throughout her day. Reports that she had a fall moving out of her house in 2021, no injuries were sustained at this time.        PMHx: [] Unremarkable [] Diabetes [] HTN  [] Pacemaker   [] MI/Heart Problems [] Cancer [] Arthritis [] Other:              [x] Refer to full medical chart  In EPIC     Past Medical History          Diagnosis Date Comments     Obese [E66.9]       Arthritis [M19.90]       Diabetes mellitus ([facility] [address] [E11.9]       Hypertension [I10]         Comorbidities:   [x] Obesity [] Dialysis  [] N/A   [x] Asthma/COPD [] Dementia [x] Other: DM    [] Stroke [] Sleep Pain Rating currently 2/10    Pain at worse Moderate to severe - unable rate    Pain at best 0/10 - following the CSI     Description of pain Sharp pain intermittently    Altered Sensation Reports bilateral arm and leg numbness    What makes it worse Working    What makes it better Sitting    Symptom progression Gradually worsening    Sleep Patient reports poor sleep at baseline            Objective:    ROM  ° A/P STRENGTH    Left Right Left Right   Hip Flex WFL WFL 4 4   Ext       ER       IR       ABD WFL WFL 3 3   ADD       Knee Flex 114 105 4 4   Ext Lacking 15 Lacking 14  3+ 3+   Ankle DF 5 8 4 4   Ankle PF    3+ 3+       OBSERVATION No Deficit Deficit Not Tested Comments   Posture       Forward Head [] [x] []    Rounded Shoulders [] [x] []    Genu Valgus [] [] [x]    Genu Varus [] [x] [] bilaterally   Genu Recurvatum [] [] [x]    Pronation [] [] [x]    Supination [] [] [x]    Leg Length Discrp [] [] [x]    Slumped Sitting [] [x] []    Palpation [] [x] [] L knee: pain to palpation of the generalized L knee and bilateral joint line  R knee: pain to the superior aspect of the R knee   Bilateral gastroc tightness and tenderness bilaterally    Sensation [x] [] []    Edema [x] [] []    Neurological [x] [] []    Patellar Mobility [] [x] []    Gait [] [x] [] Analysis: Decreased step distance bilaterally, knees remained in slight flexion throughout stance phase, waddle-appearance          Flexibility Normal Left tight Right tight Comments   HS [] [x] [x] Bilaterally    piriformis [x] [] []    ITB [x] [] []    gastroc [] [x] [x] Dec DF with knee straight bilat       FUNCTION Normal Difficult Unable   Sitting [x] [] []   Standing [] [x] []   Ambulation [] [x] []   Groom/Dress [x] [] []   Lift/Carry [] [x] []   Stairs [] [x] []   Bending [] [x] []   Squat [] [x] []   Kneel [] [] [x]     BALANCE/PROPRIOCEPTION              [x] Not tested   Single leg stance       R                     L PAIN   Eyes open                             Sec. Sec                  . []    Eyes closed                          Sec. Sec                  . []        Functional Test: Lower Extremity Functional Scale (LEFS)  Score: 46% functionally impaired     Comments:    Assessment:  46 y/o female presents with bilateral knee pain for several years. X-rays show bilateral knee OA. Arrives via wheelchair, but reports that she is able to walk and does so frequently throughout the day. She demonstrates decreased knee extension ROM and generalized LE weakness that is most affecting the patient's mobility and function. Patient would benefit from skilled physical therapy services in order to: improve knee ROM, improve LE strength, and decrease pain to ease ADL's and improve quality of life       Problems:    [x] ? Pain: 2-7/10 bilateral knee pain   [x] ? ROM: decreased knee ROM bilaterally, worse into extension   [x] ? Strength: decreased bilateral LE strength   [x] ? Function: 46% impairment on Lower Extremity Functional Scale (LEFS)  [] Other:         Goals  MET NOT MET ON-  GOING  Details   Date Addressed:        STG: To be met in 8 treatments           1. ? Pain: Decrease bilateral knee pain levels to 3/10 with ADLs []  []  []      2. ? ROM: Increase bilateral knee AROM to at least 0-120 deg to reduce difficulty with ADLs []  []  []      3. ? Strength: Increase LE MMT to 5/5 throughout to ease functional limitations and mobility  []  []  []     4. Independent with Home Exercise Programs []  []  []     5. Demonstrate knowledge of fall risk prevention  [x]  []  []  Provided handout on 7/25/22    []  []  []     Date Addressed:        LTG: To be met in 12 treatments       1. Improve score on assessment tool Lower Extremity Functional Scale (LEFS) from 46% impairment to less than 30% impairment  []  []  []     2.  Reduce bilateral knee pain levels to 0/10 or less with ADLs []  []  []      []  []  [] treatments. Pt is not allergic. Frequency:  2-3 x/week for 16  visits      Todays Treatment:  Modalities:   Precautions: Standard  Exercises:  Exercise   Reps/ Time Weight/ Level Comments         Bike/NuStep             Calf Stretch  3x30\"  Sitting at edge of bed    Hamstring Stretch  3x30\"  Edge of bed    Seated heel/toe raises x20           Supine      Quad Set  5x5\"     SLR       Heel Slides with strap  x10     Clamshells       Bridges       Sidelying hip abduction             4-way hip Tband       Tband TKE       Total Gym Squats                 Other:       Specific Instructions for next treatment: LE strengthening, posterior chain flexibility, knee ROM, vasocompression applied as needed      Evaluation Complexity:  History (Personal factors, comorbidities) [] 0 [] 1-2 [x] 3+   Exam (limitations, restrictions) [] 1-2 [] 3 [x] 4+   Clinical presentation (progression) [x] Stable [] Evolving  [] Unstable   Decision Making [x] Low [] Moderate [] High    [x] Low Complexity [] Moderate Complexity [] High Complexity       Treatment Charges: Mins Units   [x] Evaluation       [x]  Low       []  Moderate       []  High 30 1   []  Modalities     [x]  Ther Exercise 16 1   []  Manual Therapy     []  Ther Activities     []  Aquatics     []  Vasocompression     []  Other       TOTAL TREATMENT TIME: 46 min     Time in:9:21a    Time Out:10:07a    Electronically signed by: Haze Curling, PT        Physician Signature:________________________________Date:__________________  By signing above or cosigning this note, I have reviewed this plan of care and certify a need for medically necessary rehabilitation services.      *PLEASE SIGN ABOVE AND FAX BACK ALL PAGES*

## 2022-07-25 NOTE — FLOWSHEET NOTE
[place] Fall Risk Assessment    Patient Name:  Mary Carmen Hunter  : 1970    Risk Factor Scale  Score   History of Falls [x] Yes  [] No 25  0 25   Secondary Diagnosis [] Yes  [x] No 15  0 0   Ambulatory Aid [] Furniture  [] Crutches/cane/walker  [x] None/bedrest/wheelchair/nurse 30  15  0 0   IV/Heparin Lock [] Yes  [x] No 20  0 0   Gait/Transferring [] Impaired  [x] Weak  [] Normal/bedrest/immobile 20  10  0 10   Mental Status [] Forgets limitations  [x] Oriented to own ability 15  0 0      Total: 35      Based on the Assessment score: check the appropriate box.     []  No intervention needed   Low =   Score of 0-24    [x]  Use standard prevention interventions Moderate =  Score of 24-44   [x] Give patient handout and discuss fall prevention strategies   [x] Establish goal of education for patient/family RE: fall prevention strategies    []  Use high risk prevention interventions High = Score of 45 and higher   [] Give patient handout and discuss fall prevention strategies   [] Establish goal of education for patient/family Re: fall prevention strategies   [] Discuss lifeline / other resources    Electronically signed by:   Rukhsana Gaona PT  Date: 2022

## 2022-08-03 NOTE — PRE-CERTIFICATION NOTE
[x] [facility] ([facility]) [facility] TWELVESTEP [facility] &  Therapy  [address]  [phone]  F: [phone] [] [address] [facility] [address]   [address]  P: [phone]  F: [phone] [] [place]  [address] [facility]  P: [phone]  F: [phone] [] [address]  [facility]   [address]   [place]: [phone]  F: [phone]  [x] Lilian Mata   Outpatient Occupational Therapy  [address] [facility]: [phone]  F: [phone]          Therapy Pre-certification Note      8/3/2022    Al Bishop  1970   [id number]      Insurance approval was received for Physical Therapy from [facility] by Lucio Dorado on 8/3/22. Approval was received for 12 visits, from 7/25/22 to 9/15/22. Authorization number [id number]. Patient was contacted to be scheduled and requested to wait to start until 8/8/22.       Electronically signed by Brie Augustin PT on 8/3/2022 at 8:37 AM

## 2022-08-08 ENCOUNTER — HOSPITAL ENCOUNTER (OUTPATIENT)
Dept: PHYSICAL THERAPY | Age: 52
Setting detail: THERAPIES SERIES
Discharge: HOME | End: 2022-08-08
Payer: COMMERCIAL

## 2022-08-08 NOTE — FLOWSHEET NOTE
[x] [facility]) - [facility] &  Therapy  [address]    [phone]  F: [phone]   [] [address]  [facility]   [address]  P: [phone]  F: [phone]  [] [address] &  Therapy  [address] [facility]  P: [phone]  F: [phone] [] [address]  P: [phone]  F: [phone]  [] [address]  [facility]   [address]   [place]: [phone]  F: [phone]   [] [place] [id number] [facility] [address]  [place]: [phone]   F: [phone]     Physical Therapy Cancel/No Show note    Date: 2022  Patient: Milas Apley  : 1970  MRN: [id number]    Cancels/No Shows to date:     For today's appointment patient:    [x]  Cancelled    [] Rescheduled appointment    [] No-show     Reason given by patient:    [x]  Patient ill    []  Conflicting appointment    [] No transportation      [] Conflict with work    [] No reason given    [] Weather related    [] COVID-19    [x] Other:      Comments:  ear infection going to doctor.  Cancelled all appts this week scheduled fro 8/15      [x] Next appointment was confirmed    Electronically signed by: Kenisha Lipoma, PTA

## 2022-08-09 DIAGNOSIS — R52 PAIN: Primary | ICD-10-CM

## 2022-08-10 ENCOUNTER — APPOINTMENT (OUTPATIENT)
Dept: PHYSICAL THERAPY | Age: 52
End: 2022-08-10
Payer: COMMERCIAL

## 2022-08-15 ENCOUNTER — HOSPITAL ENCOUNTER (OUTPATIENT)
Dept: PHYSICAL THERAPY | Age: 52
Setting detail: THERAPIES SERIES
Discharge: HOME | End: 2022-08-15
Payer: COMMERCIAL

## 2022-08-17 ENCOUNTER — HOSPITAL ENCOUNTER (OUTPATIENT)
Dept: PHYSICAL THERAPY | Age: 52
Setting detail: THERAPIES SERIES
Discharge: HOME | End: 2022-08-17
Payer: COMMERCIAL

## 2022-08-17 PROCEDURE — 97110 THERAPEUTIC EXERCISES: CPT

## 2022-08-17 NOTE — FLOWSHEET NOTE
[x] [facility] &  Therapy  [address]  [phone]  F: [phone] [] [address]  [place] [address]   [address]  P: [phone]  F: [phone] [] [address]  [address]  P: [phone]  F: [phone] [] [address]  P: [phone]  F: [phone] [] [address]  [facility]   [address]   [place]: [phone]  F: [phone]      Physical Therapy Daily Treatment Note    Date:  2022  Patient Name:  Magda Mahmood    :  1970  MRN: [id number]  Physician: Dr. Swenson Esters: Joseph Cordero (12 visits, from 22 to 9/15/22)   Medical Diagnosis: Bilateral primary osteoarthritis of knee (M17.0 [ICD-10-CM])               Rehab Codes: M17.0, M62.81, R26.89, M79.604, M79.605, M62.838  Onset date: referral 22                                     Next 's appt.: 22  Visit# / total visits: ; Progress note for Medicare patient due at visit 10th     Cancels/No Shows: 0/0    Subjective:    Pain:  [x] Yes  [] No Location: B knees  Pain Rating: (0-10 scale) 5/10  Pain altered Tx:  [x] No  [] Yes  Action:  Comments: States pain is 5/10 in bilateral knees this date. Arrives in a wheelchair but notes that she can ambulate.     Objective:  Modalities:   Precautions:  Exercises:  Exercise Reps/ Time Weight/ Level Comments   Nustep 5 mins L3          Standing      Hip abduction 10x     Hip extension 10x     Marching 10x     Hamstring curls 10x     Hip flexion 10x           Seated      Heel/toe raises 20x     Calf stretch 3x 20 sec Stool and belt   Hamstring stretch 3x 20 sec Stool   LAQs 10x           Supine      Quad set 10x 3 sec    SLR 10x     Heel slides 10x     Clamshells 10x     Bridges 10x     SAQs 10x           Sidelying      Hip abduction 10x     Other:      Treatment Charges: Mins Units   []  Modalities     [x]  Ther Exercise 27 2   []  Manual Therapy     []  Ther Activities     []  Aquatics     []  Vasocompression     []  Other     Total Treatment time 27 mins        Assessment: [x] Progressing toward goals. Initiated exercises per log this date to increase knee ROM and strength. Reviewed exercises previously given patient as exercises are still new to patient. [] No change. [] Other:  [x] Patient would continue to benefit from skilled physical therapy services in order to: improve knee ROM, improve LE strength, and decrease pain to ease ADL's and improve quality of life     Goals MET NOT MET ON-  GOING Details   Date Addressed:           STG: To be met in 8 treatments            1. ? Pain: Decrease bilateral knee pain levels to 3/10 with ADLs []  []  []      2. ? ROM: Increase bilateral knee AROM to at least 0-120 deg to reduce difficulty with ADLs []  []  []      3. ? Strength: Increase LE MMT to 5/5 throughout to ease functional limitations and mobility []  []  []      4. Independent with Home Exercise Programs []  []  []      5. Demonstrate knowledge of fall risk prevention [x]  []  []  Provided handout on 7/25/22     []  []  []      Date Addressed:           LTG: To be met in 12 treatments           1. Improve score on assessment tool Lower Extremity Functional Scale (LEFS) from 46% impairment to less than 30% impairment  []  []  []      2. Reduce bilateral knee pain levels to 0/10 or less with ADLs []  []  []        []  []  []                                  Patient goals: pain relief, walk normal      Pt. Education:  [x] Yes  [] No  [x] Reviewed Prior HEP/Ed  Method of Education: [x] Verbal  [x] Demo  [] Written  Comprehension of Education:  [x] Verbalizes understanding. [x] Demonstrates understanding. [x] Needs review.   [] Demonstrates/verbalizes HEP/Ed previously given. Plan: [x] Continue current frequency toward long and short term goals. [x] Specific Instructions for subsequent treatments: LE strengthening, posterior chain flexibility, knee ROM, vasocompression applied as needed.  TKE and total gym squats, add theraband to 4 way hips      Time In: 11:25 am            Time Out: 11:57 am    Electronically signed by:  Koren Hodgkins, PTA

## 2022-08-23 ENCOUNTER — HOSPITAL ENCOUNTER (OUTPATIENT)
Dept: PHYSICAL THERAPY | Age: 52
Setting detail: THERAPIES SERIES
Discharge: HOME | End: 2022-08-23
Payer: COMMERCIAL

## 2022-08-23 DIAGNOSIS — R52 PAIN: Primary | ICD-10-CM

## 2022-08-23 NOTE — FLOWSHEET NOTE
[x] [facility] ([facility]) [facility] &  Therapy  [address]    [phone]  F: [phone]   [] [address]  [facility]   [address]  P: [phone]  F: [phone]  [] [address] &  Therapy  [address] [facility] [address]  P: [phone]  F: [phone] [] [address]  P: [phone]  F: [phone]  [] [address] [facility]   [address]   [place]: [phone]  F: [phone]   [] [address]  [place]: [phone]   F: [phone]     Physical Therapy Cancel/No Show note    Date: 2022  Patient: Josiha Palmer  : 1970  MRN: [id number]    Cancels/No Shows to date: 3/0    For today's appointment patient:    [x]  Cancelled    [] Rescheduled appointment    [] No-show     Reason given by patient:    [x]  Patient ill    []  Conflicting appointment    [] No transportation      [] Conflict with work    [] No reason given    [] Weather related    [] [id number]    [] Other:      Comments:        [x] Next appointment was confirmed    Electronically signed by: Clarence Winn PT

## 2022-08-24 NOTE — FLOWSHEET NOTE
[x] [address]  - [facility] TWELVESTEP [facility] &  Therapy  [address]    [phone]  F: [phone]   [] [address] [facility] [address]  [facility]   [address]  P: [phone]  F: [phone]  [] [address] &  Therapy  [address] [facility] [address]  P: [phone]  F: [phone] [] [address]  P: [phone]  F: [phone]  [] [address] [facility] [facility]   [address]   [place]: [phone]  F: [phone]   [] [address]  [place]: [phone]   F: [phone]     Physical Therapy Cancel/No Show note    Date: 2022  Patient: Tammie Lawson  : 1970  MRN: [id number]    Cancels/No Shows to date:     For today's appointment patient:    [x]  Cancelled    [] Rescheduled appointment    [] No-show     Reason given by patient:    []  Patient ill    []  Conflicting appointment    [] No transportation      [] Conflict with work    [] No reason given    [] Weather related    [] COVID-19    [x] Other:      Comments: Son has appointment - pt called on 22.           [] Next appointment was confirmed    Electronically signed by: Huong Guerrero PT

## 2022-08-25 ENCOUNTER — HOSPITAL ENCOUNTER (OUTPATIENT)
Dept: PHYSICAL THERAPY | Age: 52
Setting detail: THERAPIES SERIES
Discharge: HOME | End: 2022-08-25
Payer: COMMERCIAL

## 2022-08-30 ENCOUNTER — HOSPITAL ENCOUNTER (OUTPATIENT)
Dept: PHYSICAL THERAPY | Age: 52
Setting detail: THERAPIES SERIES
Discharge: HOME | End: 2022-08-30
Payer: COMMERCIAL

## 2022-08-30 NOTE — FLOWSHEET NOTE
[x] [facility] &  Therapy  [address]    [phone]  F: [phone]   [] [address]  [facility] 36   [address]  P: [phone]  F: [phone]  [] [address] &  Therapy  [address]  P: [phone]  F: [phone] [] [address]  P: [phone]  F: [phone]  [] [address]  [facility]   [address]   [place]: [phone]  F: [phone]   [] [place] [address] [facility] [address]  [place]: [phone]   F: [phone]     Physical Therapy Cancel/No Show note    Date: 2022  Patient: Jasmina Abarca  : 1970  MRN: [id number]    Cancels/No Shows to date:     For today's appointment patient:    [x]  Cancelled    [] Rescheduled appointment    [] No-show     Reason given by patient:    []  Patient ill    []  Conflicting appointment    [] No transportation      [] Conflict with work    [] No reason given    [] Weather related    [] COVID-19    [] Other:      Comments: Patient reports that her scooter is arriving today. Unable to decipher further information due to background noise during the call.        [x] Next appointment was confirmed    Electronically signed by: Arturo Alston PT

## 2022-09-02 ENCOUNTER — HOSPITAL ENCOUNTER (OUTPATIENT)
Dept: PHYSICAL THERAPY | Age: 52
Setting detail: THERAPIES SERIES
Discharge: HOME | End: 2022-09-02

## 2022-09-02 PROCEDURE — 97110 THERAPEUTIC EXERCISES: CPT

## 2022-09-02 NOTE — FLOWSHEET NOTE
Stool   LAQs 10x           Supine      Quad set 10x 3 sec    SLR 15x     Heel slides 10x     Clamshells 10x     Bridges 10x  Painful   SAQs 10x           Sidelying      Clams 15x     Hip abduction 15x     Other:      Treatment Charges: Mins Units   []  Modalities     [x]  Ther Exercise 44 3   []  Manual Therapy     []  Ther Activities     []  Aquatics     []  Vasocompression     []  Other     Total Treatment time 44 3       Assessment: [x] Progressing toward goals. Increased reps with heel raises and marches for increased strength. Inc reps with clams and SL hip abd with no increase in pain. Required tactile cueing for activation of glute med without compensation of quads. Experienced hamstring cramps with bridges. [] No change. [] Other:  [x] Patient would continue to benefit from skilled physical therapy services in order to: improve knee ROM, improve LE strength, and decrease pain to ease ADL's and improve quality of life     Goals MET NOT MET ON-  GOING Details   Date Addressed:           STG: To be met in 8 treatments            1. ? Pain: Decrease bilateral knee pain levels to 3/10 with ADLs []  []  []      2. ? ROM: Increase bilateral knee AROM to at least 0-120 deg to reduce difficulty with ADLs []  []  []      3. ? Strength: Increase LE MMT to 5/5 throughout to ease functional limitations and mobility []  []  []      4. Independent with Home Exercise Programs []  []  []      5. Demonstrate knowledge of fall risk prevention [x]  []  []  Provided handout on 7/25/22     []  []  []      Date Addressed:           LTG: To be met in 12 treatments           1. Improve score on assessment tool Lower Extremity Functional Scale (LEFS) from 46% impairment to less than 30% impairment  []  []  []      2. Reduce bilateral knee pain levels to 0/10 or less with ADLs []  []  []        []  []  []                                  Patient goals: pain relief, walk normal      Pt.  Education:  [x] Yes  [] No  [x] Reviewed Prior HEP/Ed  Method of Education: [x] Verbal  [x] Demo  [] Written  Comprehension of Education:  [x] Verbalizes understanding. [x] Demonstrates understanding. [x] Needs review. [] Demonstrates/verbalizes HEP/Ed previously given. Plan: [x] Continue current frequency toward long and short term goals. [x] Specific Instructions for subsequent treatments: LE strengthening, posterior chain flexibility, knee ROM, vasocompression applied as needed.  TKE and total gym squats, add theraband to 4 way hips      Time In: 1:00 pm            Time Out: 1:49 pm    Electronically signed by:  Nataly Jeffery PTA

## 2022-09-07 ENCOUNTER — HOSPITAL ENCOUNTER (OUTPATIENT)
Dept: PHYSICAL THERAPY | Age: 52
Setting detail: THERAPIES SERIES
Discharge: HOME | End: 2022-09-07

## 2022-09-07 ENCOUNTER — OFFICE VISIT (OUTPATIENT)
Dept: ORTHOPEDIC SURGERY | Age: 52
End: 2022-09-07
Payer: MEDICARE

## 2022-09-07 VITALS — WEIGHT: 223 LBS | HEIGHT: 59 IN | BODY MASS INDEX: 44.96 KG/M2

## 2022-09-07 DIAGNOSIS — M65.331 ACQUIRED TRIGGER FINGER OF BOTH MIDDLE FINGERS: Primary | ICD-10-CM

## 2022-09-07 DIAGNOSIS — M65.332 ACQUIRED TRIGGER FINGER OF BOTH MIDDLE FINGERS: Primary | ICD-10-CM

## 2022-09-07 DIAGNOSIS — M65.311 BILATERAL TRIGGER THUMB: ICD-10-CM

## 2022-09-07 DIAGNOSIS — M65.322 ACQUIRED TRIGGER FINGER OF BOTH INDEX FINGERS: ICD-10-CM

## 2022-09-07 DIAGNOSIS — M65.321 ACQUIRED TRIGGER FINGER OF BOTH INDEX FINGERS: ICD-10-CM

## 2022-09-07 DIAGNOSIS — M65.312 BILATERAL TRIGGER THUMB: ICD-10-CM

## 2022-09-07 PROCEDURE — 20550 NJX 1 TENDON SHEATH/LIGAMENT: CPT | Performed by: STUDENT IN AN ORGANIZED HEALTH CARE EDUCATION/TRAINING PROGRAM

## 2022-09-07 PROCEDURE — 99203 OFFICE O/P NEW LOW 30 MIN: CPT | Performed by: STUDENT IN AN ORGANIZED HEALTH CARE EDUCATION/TRAINING PROGRAM

## 2022-09-07 PROCEDURE — 97110 THERAPEUTIC EXERCISES: CPT

## 2022-09-07 NOTE — FLOWSHEET NOTE
[x] [facility]) [facility] &  Therapy  [address]  [phone]  F: [phone] [] [address]  [facility]   [address]  P: [phone]  F: [phone] [] [address]  [address]  P: [phone]  F: [phone] [] [address]  P: [phone]  F: [phone] [] [address]  [facility]   [address]   [place]: [phone]  F: [phone]      Physical Therapy Daily Treatment Note    Date:  2022  Patient Name:  Luz Elena Arroyo    :  1970  MRN: [id number]  Physician: Dr. Youssef Diver: Charis Salas (12 visits, from 22 to 9/15/22)   Medical Diagnosis: Bilateral primary osteoarthritis of knee (M17.0 [ICD-10-CM])               Rehab Codes: M17.0, M62.81, R26.89, M79.604, M79.605, M62.838  Onset date: referral 22                                     Next 's appt. : 22  Visit# / total visits: ; Progress note for Medicare patient due at visit 10th     Cancels/No Shows: 5/0    Subjective:    Pain:  [x] Yes  [] No Location: B knees  Pain Rating: (0-10 scale) 2-3/10  Pain altered Tx:  [x] No  [] Yes  Action:  Comments:  Reports a decrease in B knee pain. Min pain in R knee, maybe a 1/10. L knee is 2-3/10. Walked around the park this weekend.       Objective:  Modalities:   Precautions:  Exercises:  Exercise Reps/ Time Weight/ Level Comments   Nustep 5 mins L5 Inc resistance 9.7         Standing      Incline stretch 3x20\"     Hip abduction 10x2     Hip extension 10x2     Marching 10x2  Inc reps 9.2   Hamstring curls 10x2     Hip flexion 10x2     Heel raises 10x2  Inc reps 9.2   Step ups L 10  R 5  Painful on R   TKE ADD     TG squats ADD Seated      Heel/toe raises 20x     Calf stretch 3x 20 sec Stool and belt   Hamstring stretch 3x 20 sec Stool   LAQs 10x 2 lbs Added wt 9.7         Supine      Quad set 10x 3 sec    SLR 10x     Heel slides 10x     Prone hip ext 10x  Added 9.7   Prone HS curls 10x  Added 9.7         Clamshells 10x     Bridges 10x2     SAQs 10x2 2 lbs Added wt 9.7         Sidelying      Clams 15x     Hip abduction 15x     Other:      Treatment Charges: Mins Units   []  Modalities     [x]  Ther Exercise 44 3   []  Manual Therapy     []  Ther Activities     []  Aquatics     []  Vasocompression     []  Other     Total Treatment time 44 3       Assessment: [x] Progressing toward goals. Increased reps with standing exercises to increase LE strength and muscular endurance. Required sit down rest breaks. Added step up to improve function for ADL's. Weights added with LAQ/SAQ for quad strengthening. Progressed hip strengthening with prone hip ext. Reviewed and updated HEP. [] No change. [] Other:  [x] Patient would continue to benefit from skilled physical therapy services in order to: improve knee ROM, improve LE strength, and decrease pain to ease ADL's and improve quality of life     Goals MET NOT MET ON-  GOING Details   Date Addressed:           STG: To be met in 8 treatments            1. ? Pain: Decrease bilateral knee pain levels to 3/10 with ADLs []  []  []      2. ? ROM: Increase bilateral knee AROM to at least 0-120 deg to reduce difficulty with ADLs []  []  []      3. ? Strength: Increase LE MMT to 5/5 throughout to ease functional limitations and mobility []  []  []      4. Independent with Home Exercise Programs []  []  []      5. Demonstrate knowledge of fall risk prevention [x]  []  []  Provided handout on 7/25/22     []  []  []      Date Addressed:           LTG: To be met in 12 treatments           1.  Improve score on assessment tool Lower Extremity Functional Scale (LEFS) from 46% impairment to less than 30% impairment  []  []  []      2. Reduce bilateral knee pain levels to 0/10 or less with ADLs []  []  []        []  []  []                                  Patient goals: pain relief, walk normal    Pt. Education:  [x] Yes  [] No  [x] Reviewed Prior HEP/Ed  Method of Education: [x] Verbal  [x] Demo  [] Written  Comprehension of Education:  [x] Verbalizes understanding. [x] Demonstrates understanding. [x] Needs review. [x] Demonstrates/verbalizes HEP/Ed previously given. Access Code: [email or web address]: [email or web address]: 09/07/2022Prepared by: Sandra Cuevas Stretch on Wall - 1 x daily - 7 x weekly - 3 sets - 10 reps   Step Up - 1 x daily - 7 x weekly - 3 sets - 10 reps   Heel raises - 1 x daily - 7 x weekly - 3 sets - 10 reps   Standing Marching - 1 x daily - 7 x weekly - 3 sets - 10 reps   Standing Hip Abduction with Counter Support - 1 x daily - 7 x weekly - 3 sets - 10 reps   Standing hip extension - 1 x daily - 7 x weekly - 3 sets - 10 reps   Hip flexion - 1 x daily - 7 x weekly - 3 sets - 10 reps   Standing HS curls - 1 x daily - 7 x weekly - 3 sets - 10 reps   LAQ - 1 x daily - 7 x weekly - 3 sets - 10 reps       Access Code: [email or web address]  URL: [email or web address]  Date: 07/25/2022  Prepared by: Arturo Alston     Exercises  Long Sitting Quad Set - 2 x daily - 7 x weekly - 10 reps - 5 (sec) hold  Supine Heel Slide with Strap - 2 x daily - 7 x weekly - 3 sets - 10 reps  Seated Hamstring Stretch - 2 x daily - 7 x weekly - 3 sets - 30 hold  Seated Calf Stretch with Strap - 2 x daily - 7 x weekly - 3 sets - 30 hold  Seated Heel Toe Raises - 2 x daily - 7 x weekly - 3 sets - 10 reps     Patient Education  Knee Osteoarthritis    Plan: [x] Continue current frequency toward long and short term goals. [x] Specific Instructions for subsequent treatments: LE strengthening, posterior chain flexibility, knee ROM, vasocompression applied as needed.  TKE and total gym squats, add theraband to 4 way hips      Time In: 9:00 am            Time Out: 9:49 am    Electronically signed by:  Chris Hoff PTA

## 2022-09-09 ENCOUNTER — HOSPITAL ENCOUNTER (OUTPATIENT)
Dept: PHYSICAL THERAPY | Age: 52
Setting detail: THERAPIES SERIES
Discharge: HOME | End: 2022-09-09

## 2022-09-09 RX ORDER — LIDOCAINE HYDROCHLORIDE 10 MG/ML
0.5 INJECTION, SOLUTION INFILTRATION; PERINEURAL ONCE
Status: DISCONTINUED | OUTPATIENT
Start: 2022-09-09 | End: 2022-09-22 | Stop reason: HOSPADM

## 2022-09-09 RX ORDER — BETAMETHASONE SODIUM PHOSPHATE AND BETAMETHASONE ACETATE 3; 3 MG/ML; MG/ML
0.5 INJECTION, SUSPENSION INTRA-ARTICULAR; INTRALESIONAL; INTRAMUSCULAR; SOFT TISSUE ONCE
Status: DISCONTINUED | OUTPATIENT
Start: 2022-09-09 | End: 2022-09-22 | Stop reason: HOSPADM

## 2022-09-09 NOTE — FLOWSHEET NOTE
[x] [facility] ([facility]) [facility] &  Therapy  [address]    [phone]  F: [phone]   [] [address]  [facility] 36   [address]  P: [phone]  F: [phone]  [] [address] &  Therapy  [address]  P: [phone]  F: [phone] [] [address]  P: [phone]  F: [phone]  [] [address]  [facility]   [address]   [place]: [phone]  F: [phone]   [] [place] [address] [facility] [address]  [place]: [phone]   F: [phone]     Physical Therapy Cancel/No Show note    Date: 2022  Patient: Travis Owusu  : 1970  MRN: [id number]    Cancels/No Shows to date:     For today's appointment patient:    [x]  Cancelled    [] Rescheduled appointment    [] No-show     Reason given by patient:    [x]  Patient ill    []  Conflicting appointment    [] No transportation      [] Conflict with work    [] No reason given    [] Weather related    [] [facility]    [] Other:      Comments:        [x] Next appointment was confirmed    Electronically signed by: Muna Ybarra PTA

## 2022-09-09 NOTE — PROGRESS NOTES
[address] [facility]  [facility] [address]  Dept: [phone]    Ambulatory Orthopedic Office Visit      CHIEF COMPLAINT:    Chief Complaint   Patient presents with    Follow-up     Trigger finger multiple in both hand     HISTORY OF PRESENT ILLNESS:    The patient is a 47 y.o. female who is being seen at the request of Denice Paulino MD for consultation and evaluation of trigger fingers. Bilateral trigger fingers worse over the past year. Had trialed an injection of her left index finger and it worked well for her. Has had carpal tunnel for many years, tried night splinting. Admits to median nerve dysesthesias that can wake her up at night. Main concern is fingers get stuck occasionally, predominantly the middle finger. Denies any new injuries. Denies numbness, tingling, fevers, chills, shortness of breath, or chest pain. Diabetes sugars 110 this morning.      Past Medical History:    Past Medical History:   Diagnosis Date    Arthritis     Diabetes mellitus ([address]     Hypertension     Obese      Past Surgical History:    Past Surgical History:   Procedure Laterality Date    HERNIA REPAIR      TUBAL LIGATION       Current Medications:   Current Outpatient Medications   Medication Sig Dispense Refill    meloxicam (MOBIC) 15 MG tablet Take 1 tablet by mouth daily 30 tablet 3    docusate sodium (COLACE) 100 MG capsule Take 1 capsule by mouth 2 times daily 14 capsule 0    simvastatin (ZOCOR) 40 MG tablet Take 40 mg by mouth nightly      meloxicam (MOBIC) 15 MG tablet Take 15 mg by mouth daily      acetaminophen (TYLENOL) 325 MG tablet Take 650 mg by mouth every 6 hours as needed for Pain      ondansetron (ZOFRAN) 4 MG tablet Take 1 tablet by mouth every 8 hours as needed for Nausea 8 tablet 0    ondansetron (ZOFRAN) 4 MG tablet Take 1 tablet by mouth every 8 hours as needed for Nausea 4 tablet 0    lisinopril-hydrochlorothiazide (PRINZIDE;ZESTORETIC) 20-12.5 MG per tablet Take 1 tablet by mouth daily      benzonatate (TESSALON) 100 MG capsule Take 1 capsule by mouth 3 times daily as needed for Cough 20 capsule 0    METFORMIN HCL PO Take 1,000 mg by mouth 2 times daily. LISINOPRIL PO Take 20 mg by mouth daily. No current facility-administered medications for this visit. Allergies:    Codeine and Percocet [oxycodone-acetaminophen]  Social History:   Social History     Socioeconomic History    Marital status: Single     Spouse name: Not on file    Number of children: Not on file    Years of education: Not on file    Highest education level: Not on file   Occupational History    Not on file   Tobacco Use    Smoking status: Former    Smokeless tobacco: Never   Substance and Sexual Activity    Alcohol use: No    Drug use: No    Sexual activity: Not on file   Other Topics Concern    Not on file   Social History Narrative    Not on file     Social Determinants of Health     Financial Resource Strain: Not on file   Food Insecurity: Not on file   Transportation Needs: Not on file   Physical Activity: Not on file   Stress: Not on file   Social Connections: Not on file   Intimate Partner Violence: Not on file   Housing Stability: Not on file     Family History:  History reviewed. No pertinent family history. REVIEW OF SYSTEMS:  Constitutional: Negative for fever and chills. HENT: Negative forcongestion or drainage   Eyes: Negative for blurred vision and double vision. Respiratory: Negative for cough, shortness of breath and wheezing. Cardiovascular: Negative for chest pain and palpitations. Gastrointestinal: Negative for nausea. Negative for vomiting. Musculoskeletal: Positive for myalgias and joint pain. Skin: Negative for itching and rash.      PHYSICAL EXAM:  Ht 4' 11\" (1.499 m)   Wt 223 lb (101.2 kg)   BMI 45.04 kg/m²     General: Jasmina Abarca is a 47 y.o. female who is alert and oriented and sitting comfortably in our office. Neuro: Alert and oriented. Eyes: Extra-ocular muscles intact. Mouth: Oral mucosa moist. No perioral lesions. Pulm: Respirations unlabored and regular. Skin: Warm, well perfused. Psych: Patient has good fund of knowledge and displays understanging of exam, diagnosis, and plan. Ortho Exam:  MS:  BUE: Triggering noted at 1st, 2nd, 3rd digits. Tender A1 pulley. Tinels positive. Phalens positive. Durkins positive. R>L thenar atrophy. Spurling negative. Compartments soft. Median/Radial/Ulnar/AIN/PIN motor intact. Median/Radial/Ulnar nerve sensation intact to light touch. 2+ rad pulse. RADIOLOGY:   History:  Left hand trigger fingers       Comparison:  None       Findings:  3 views of the left hand AP, oblique, lateral demonstrate no    acute fracture or osseus deformity. No foreign bodies. No loose bodies. No    joint dislocations. Volar ossicle at 2nd MTP joint. Mild CMC degeneration. Impression:  Left hand no acute osseus abnormality, fracture, or    dislocation. History:  Right trigger fingers       Comparison:  None       Findings:  3 views of the right hand AP, lateral, oblique demonstrate no    acute fracture or osseus deformity. No dislocations. No foreign bodies. No    loose bodies. Mild degenerative CMC and DIP changes       Impression:  No acute osseus abnormality, fracture, or dislocation. Mild    CMC degeneration. Injection procedure note  The alternatives, benefits, and risks were discussed with the patient. After answering all questions to the patient's satisfaction, the patient agreed to proceed forward with injection and gave verbal consent for the procedure. With the patient's permission, appropriate anatomic landmarks were identified and the right and left middle finger was prepped in a sterile fashion using alcohol and/or betadine. A 21 gauge needle was then used to inject  . 5 1% lidocaine  and .5 cc celestone into A1 pulley of each hand middle finger. The

## 2022-09-13 ENCOUNTER — HOSPITAL ENCOUNTER (OUTPATIENT)
Dept: PHYSICAL THERAPY | Age: 52
Setting detail: THERAPIES SERIES
Discharge: HOME | End: 2022-09-13

## 2022-09-13 NOTE — FLOWSHEET NOTE
[x] [address]  - [facility] &  Therapy  [address]    [phone]  F: [phone]   [] [address] [facility] 36   [address]  P: [phone]  F: [phone]  [] [address] &  Therapy  [address]  P: [phone]  F: [phone] [] [address]  P: [phone]  F: [phone]  [] [address] [facility]   [address]   [place]: [phone]  F: [phone]   [] [address]  [place]: [phone]   F: [phone]     Physical Therapy Cancel/No Show note    Date: 2022  Patient: Mirella Forbes  : 1970  MRN: [id number]    Cancels/No Shows to date:     For today's appointment patient:    [x]  Cancelled    [] Rescheduled appointment    [] No-show     Reason given by patient:    [x]  Patient ill    []  Conflicting appointment    [] No transportation      [] Conflict with work    [] No reason given    [] Weather related    [] [id number]    [] Other:      Comments:        [x] Next appointment was confirmed    Electronically signed by: Barby Youngblood PT

## 2022-09-15 ENCOUNTER — HOSPITAL ENCOUNTER (OUTPATIENT)
Dept: PHYSICAL THERAPY | Age: 52
Setting detail: THERAPIES SERIES
Discharge: HOME | End: 2022-09-15

## 2022-09-15 PROCEDURE — 97110 THERAPEUTIC EXERCISES: CPT

## 2022-09-15 NOTE — FLOWSHEET NOTE
TG squats ADD           Seated      Heel/toe raises 20x     Calf stretch 3x 20 sec Stool and belt   Hamstring stretch 3x 20 sec Stool   LAQs 10x 2 lbs Added wt 9.7         Supine      Quad set 10x 3 sec    SLR      Heel slides      Prone hip ext   Added 9.7   Prone HS curls   Added 9.7         Clamshells 10x     Bridges 10x2     SAQs  2 lbs Added wt 9.7         Sidelying      Clams 2x10     Hip abduction 2x10     Other:     ROM  ° A/P STRENGTH     Left Right Left Right   Hip Flex WFL WFL 5 5   Ext           ER           IR           ABD WFL WFL 3 3   ADD           Knee Flex 105 105 4 4   Ext Lacking 15 Lacking 15 5 5   Ankle DF   5 5   Ankle PF      4 4         Treatment Charges: Mins Units   []  Modalities     [x]  Ther Exercise 50 3   []  Manual Therapy     []  Ther Activities     []  Aquatics     []  Vasocompression     []  Other     Total Treatment time 50 3       Assessment: [x] Progressing toward goals. Encouraged continued HEP. She requires minimal cueing during session this date, intermittent rest breaks required in standing due to fatigue. Patient insurance authorization has  this date. Even though patient has only used 5/12 visits, she is agreeable to discharge as she has other life stressors she needs to attend to. She has made improvements in her pain and strength, though ROM continues to be limited. Stressed extension stretching to improve gait mechanics. She voices understanding. [] No change.      [] Other:  [x] Patient would continue to benefit from skilled physical therapy services in order to: improve knee ROM, improve LE strength, and decrease pain to ease ADL's and improve quality of life     Goals MET NOT MET ON-  GOING Details   Date Addressed:           STG: To be met in 8 treatments            1. ? Pain: Decrease bilateral knee pain levels to 3/10 with ADLs []  [x]  []  Reports 6/10 in beginning of session    2. ? ROM: Increase bilateral knee AROM to at least 0-120 deg to reduce difficulty with ADLs []  [x]  []  See knee ROM measurements above     3. ? Strength: Increase LE MMT to 5/5 throughout to ease functional limitations and mobility []  [x]  []  Improved, mild hip weakness and PF weakness     4. Independent with Home Exercise Programs [x]  []  []      5. Demonstrate knowledge of fall risk prevention [x]  []  []  Provided handout on 7/25/22     []  []  []      Date Addressed:           LTG: To be met in 12 treatments           1. Improve score on assessment tool Lower Extremity Functional Scale (LEFS) from 46% impairment to less than 30% impairment  []  [x]  []  36% impairment this date     2. Reduce bilateral knee pain levels to 0/10 or less with ADLs []  [x]  []        []  []  []                                  Patient goals: pain relief, walk normal    Pt. Education:  [x] Yes  [] No  [x] Reviewed Prior HEP/Ed  Method of Education: [x] Verbal  [x] Demo  [] Written  Comprehension of Education:  [x] Verbalizes understanding. [x] Demonstrates understanding. [x] Needs review. [x] Demonstrates/verbalizes HEP/Ed previously given. Access Code: [email or web address]: [employer]/Date: 09/07/2022Prepared by: Maxim Garzon Stretch on Wall - 1 x daily - 7 x weekly - 3 sets - 10 reps   Step Up - 1 x daily - 7 x weekly - 3 sets - 10 reps   Heel raises - 1 x daily - 7 x weekly - 3 sets - 10 reps   Standing Marching - 1 x daily - 7 x weekly - 3 sets - 10 reps   Standing Hip Abduction with Counter Support - 1 x daily - 7 x weekly - 3 sets - 10 reps   Standing hip extension - 1 x daily - 7 x weekly - 3 sets - 10 reps   Hip flexion - 1 x daily - 7 x weekly - 3 sets - 10 reps   Standing HS curls - 1 x daily - 7 x weekly - 3 sets - 10 reps   LAQ - 1 x daily - 7 x weekly - 3 sets - 10 reps       Access Code: [email or web address]  URL: [email or web address]  Date: 07/25/2022  Prepared by: Kennedy Hinkle     Exercises  Long Sitting Quad Set - 2 x daily - 7 x weekly - 10 reps - 5 (sec) hold  Supine Heel Slide with Strap - 2 x daily - 7 x weekly - 3 sets - 10 reps  Seated Hamstring Stretch - 2 x daily - 7 x weekly - 3 sets - 30 hold  Seated Calf Stretch with Strap - 2 x daily - 7 x weekly - 3 sets - 30 hold  Seated Heel Toe Raises - 2 x daily - 7 x weekly - 3 sets - 10 reps     Patient Education  Knee Osteoarthritis    Plan: [] Continue current frequency toward long and short term goals. [] Specific Instructions for subsequent treatments: LE strengthening, posterior chain flexibility, knee ROM, vasocompression applied as needed. TKE and total gym squats, add theraband to 4 way hips  [x] Due to hx of missed visits, patients insurance authorization expires today, 9/15/22. Will proceed to discharge and patient is agreeable to this.        Time In: 3:30 pm            Time Out: 4:30 pm    Electronically signed by:  Danyelle Zulugaa PT

## 2022-09-15 NOTE — DISCHARGE SUMMARY
[x] [facility]) [facility] &  Therapy  [address]  [phone]  F: [phone] [] [address]  [place] [address]   [address]  P: [phone]  F: [phone] [] [address] &  Therapy  [address] [facility]  P: [phone]  F: [phone] [] [address]  P: [phone]  F: [phone] [] [address]  [facility]   [address]   [place]: [phone]  F: [phone]      Physical Therapy Discharge Note    Date: 9/15/2022      Patient: Rajni Langford  : 1970  MRN: [id number]    Physician: Dr. Janes Magana: Fatimah Orozco (12 visits, from 22 to 9/15/22)   Medical Diagnosis: Bilateral primary osteoarthritis of knee (M17.0 [ICD-10-CM])               Rehab Codes: M17.0, M62.81, R26.89, M79.604, M79.605, M62.838  Onset date: referral 22                                     Next Dr's appt. : 22  Visit# / total visits: ; Progress note for Medicare patient due at visit 10th                                 Cancels/No Shows:   Date of initial visit:                 Date of final visit: 9/15/22      Subjective:    Pain:  [x] Yes  [] No Location: B knees  Pain Rating: (0-10 scale) not rated/10  Pain altered Tx:  [x] No  [] Yes  Action:  Comments:  Reports a decrease in B knee pain. She complains about her carpal tunnel and back pain this date. Frequently stating how exercise decreases her stress, as she has a lot going on at home.      Objective:  Test Measurements:              ROM  ° A/P STRENGTH     Left Right Left Right   Hip Flex [facility] WFL 5 5   Ext           ER           IR           ABD [facility] WFL 3 3   ADD           Knee Flex 105 105 4 4   Ext Lacking 15 Lacking 15 5 5   Ankle DF     5 5   Ankle PF      4 4        Function: 36% impairment on Lower Extremity Functional Scale (LEFS) this date    Assessment: She requires minimal cueing during session this date, intermittent rest breaks required in standing due to fatigue. Patient insurance authorization has  this date. Even though patient has only used 5/12 visits, she is agreeable to discharge as she has other life stressors she needs to attend to. She has made improvements in her pain and strength, though ROM continues to be limited. Stressed extension stretching to improve gait mechanics. She voices understanding. Encouraged continued HEP and minimal use of the power chair to continue to improve independence and maintain mobility. Goals MET NOT MET ON-  GOING Details   Date Addressed: 9/15/22           STG: To be met in 8 treatments            1. ? Pain: Decrease bilateral knee pain levels to 3/10 with ADLs []  [x]  []  Reports 6/10 in beginning of session    2. ? ROM: Increase bilateral knee AROM to at least 0-120 deg to reduce difficulty with ADLs []  [x]  []  See knee ROM measurements above     3. ? Strength: Increase LE MMT to 5/5 throughout to ease functional limitations and mobility []  [x]  []  Improved, mild hip weakness and PF weakness     4. Independent with Home Exercise Programs [x]  []  []      5. Demonstrate knowledge of fall risk prevention [x]  []  []  Provided handout on 22     []  []  []      Date Addressed: 9/15/22           LTG: To be met in 12 treatments           1. Improve score on assessment tool Lower Extremity Functional Scale (LEFS) from 46% impairment to less than 30% impairment  []  [x]  []  36% impairment this date     2.  Reduce bilateral knee pain levels to 0/10 or less with ADLs []  [x]  []        []  []  []                                  Patient goals: pain relief, walk normal      Treatment to Date:  [x] Therapeutic Exercise    [] Modalities:  [] Therapeutic Activity    [] Ultrasound  [] Electrical Stimulation  [] Gait Training     [] Massage       [] Lumbar/Cervical Traction  [] Neuromuscular Re-education [] Cold/hotpack [] Iontophoresis: 4 mg/mL  [x] Instruction in Home Exercise Program                     Dexamethasone Sodium  [] Manual Therapy             Phosphate 40-80 mAmin  [] Aquatic Therapy                   [] Vasocompression/    [] Other:             Game Ready    Discharge Status:     [] Pt recovered from conditions. Treatment goals were met. [] Pt received maximum benefit. No further therapy indicated at this time. [x] Pt to continue exercise/home instructions independently. [] Therapy interrupted due to:    [] Pt has 2 or more no shows/cancels, is discontinued per our policy. [x] Pt has completed prescribed number of treatment sessions. [] Other:         Electronically signed by Yudi Salcido PT on 9/15/2022 at 4:42 PM      If you have any questions or concerns, please don't hesitate to call.   Thank you for your referral.

## 2022-09-19 ENCOUNTER — APPOINTMENT (OUTPATIENT)
Dept: CT IMAGING | Age: 52
End: 2022-09-19
Payer: MEDICARE

## 2022-09-19 ENCOUNTER — HOSPITAL ENCOUNTER (OUTPATIENT)
Age: 52
Setting detail: OBSERVATION
Discharge: HOME | End: 2022-09-22
Attending: EMERGENCY MEDICINE | Admitting: INTERNAL MEDICINE
Payer: MEDICARE

## 2022-09-19 DIAGNOSIS — K86.89 PANCREATIC MASS: ICD-10-CM

## 2022-09-19 DIAGNOSIS — N10 ACUTE PYELONEPHRITIS: Primary | ICD-10-CM

## 2022-09-19 PROBLEM — E87.29 HIGH ANION GAP METABOLIC ACIDOSIS: Status: ACTIVE | Noted: 2022-09-19

## 2022-09-19 PROBLEM — N28.9 RENAL INSUFFICIENCY: Status: ACTIVE | Noted: 2022-09-19

## 2022-09-19 PROBLEM — E87.1 HYPONATREMIA: Status: ACTIVE | Noted: 2022-09-19

## 2022-09-19 PROBLEM — N30.00 ACUTE CYSTITIS: Status: ACTIVE | Noted: 2022-09-19

## 2022-09-19 PROBLEM — R10.84 GENERALIZED ABDOMINAL PAIN: Status: ACTIVE | Noted: 2022-09-19

## 2022-09-19 PROBLEM — N12 PYELONEPHRITIS: Status: ACTIVE | Noted: 2022-09-19

## 2022-09-19 PROBLEM — E11.9 TYPE 2 DIABETES MELLITUS, WITHOUT LONG-TERM CURRENT USE OF INSULIN (HCC): Status: ACTIVE | Noted: 2022-09-19

## 2022-09-19 LAB
ABSOLUTE EOS #: 0.05 K/UL (ref 0–0.44)
ABSOLUTE IMMATURE GRANULOCYTE: 0.03 K/UL (ref 0–0.3)
ABSOLUTE LYMPH #: 1.82 K/UL (ref 1.1–3.7)
ABSOLUTE MONO #: 0.73 K/UL (ref 0.1–1.2)
ALBUMIN SERPL-MCNC: 4.1 G/DL (ref 3.5–5.2)
ALBUMIN/GLOBULIN RATIO: 1.1 (ref 1–2.5)
ALP BLD-CCNC: 129 U/L (ref 35–104)
ALT SERPL-CCNC: 38 U/L (ref 5–33)
ANION GAP SERPL CALCULATED.3IONS-SCNC: 15 MMOL/L (ref 9–17)
AST SERPL-CCNC: 34 U/L
BACTERIA: ABNORMAL
BASOPHILS # BLD: 0 % (ref 0–2)
BASOPHILS ABSOLUTE: 0.03 K/UL (ref 0–0.2)
BILIRUB SERPL-MCNC: 0.8 MG/DL (ref 0.3–1.2)
BILIRUBIN URINE: NEGATIVE
BUN BLDV-MCNC: 19 MG/DL (ref 6–20)
CALCIUM SERPL-MCNC: 8.8 MG/DL (ref 8.6–10.4)
CASTS UA: ABNORMAL /LPF (ref 0–8)
CHLORIDE BLD-SCNC: 97 MMOL/L (ref 98–107)
CO2: 22 MMOL/L (ref 20–31)
COLOR: ABNORMAL
CREAT SERPL-MCNC: 1.36 MG/DL (ref 0.5–0.9)
EOSINOPHILS RELATIVE PERCENT: 1 % (ref 1–4)
EPITHELIAL CELLS UA: ABNORMAL /HPF (ref 0–5)
GFR AFRICAN AMERICAN: 49 ML/MIN
GFR NON-AFRICAN AMERICAN: 41 ML/MIN
GFR SERPL CREATININE-BSD FRML MDRD: ABNORMAL ML/MIN/{1.73_M2}
GLUCOSE BLD-MCNC: 133 MG/DL (ref 70–99)
GLUCOSE BLD-MCNC: 141 MG/DL (ref 65–105)
GLUCOSE URINE: NEGATIVE
HCT VFR BLD CALC: 34.5 % (ref 36.3–47.1)
HEMOGLOBIN: 12 G/DL (ref 11.9–15.1)
IMMATURE GRANULOCYTES: 0 %
KETONES, URINE: ABNORMAL
LEUKOCYTE ESTERASE, URINE: ABNORMAL
LIPASE: 34 U/L (ref 13–60)
LYMPHOCYTES # BLD: 20 % (ref 24–43)
MCH RBC QN AUTO: 31.6 PG (ref 25.2–33.5)
MCHC RBC AUTO-ENTMCNC: 34.8 G/DL (ref 28.4–34.8)
MCV RBC AUTO: 90.8 FL (ref 82.6–102.9)
MONOCYTES # BLD: 8 % (ref 3–12)
NITRITE, URINE: POSITIVE
NRBC AUTOMATED: 0 PER 100 WBC
PDW BLD-RTO: 12.1 % (ref 11.8–14.4)
PH UA: 5.5 (ref 5–8)
PLATELET # BLD: 230 K/UL (ref 138–453)
PMV BLD AUTO: 9.3 FL (ref 8.1–13.5)
POTASSIUM SERPL-SCNC: 3.7 MMOL/L (ref 3.7–5.3)
PROTEIN UA: ABNORMAL
RBC # BLD: 3.8 M/UL (ref 3.95–5.11)
RBC UA: ABNORMAL /HPF (ref 0–4)
SEG NEUTROPHILS: 71 % (ref 36–65)
SEGMENTED NEUTROPHILS ABSOLUTE COUNT: 6.51 K/UL (ref 1.5–8.1)
SODIUM BLD-SCNC: 134 MMOL/L (ref 135–144)
SPECIFIC GRAVITY UA: 1.02 (ref 1–1.03)
TOTAL PROTEIN: 7.9 G/DL (ref 6.4–8.3)
TURBIDITY: ABNORMAL
URINE HGB: ABNORMAL
UROBILINOGEN, URINE: NORMAL
WBC # BLD: 9.2 K/UL (ref 3.5–11.3)
WBC UA: ABNORMAL /HPF (ref 0–5)

## 2022-09-19 PROCEDURE — 99220 PR INITIAL OBSERVATION CARE/DAY 70 MINUTES: CPT | Performed by: INTERNAL MEDICINE

## 2022-09-19 PROCEDURE — 84156 ASSAY OF PROTEIN URINE: CPT

## 2022-09-19 PROCEDURE — 74176 CT ABD & PELVIS W/O CONTRAST: CPT

## 2022-09-19 PROCEDURE — 6370000000 HC RX 637 (ALT 250 FOR IP)

## 2022-09-19 PROCEDURE — 96375 TX/PRO/DX INJ NEW DRUG ADDON: CPT

## 2022-09-19 PROCEDURE — 96361 HYDRATE IV INFUSION ADD-ON: CPT

## 2022-09-19 PROCEDURE — 96374 THER/PROPH/DIAG INJ IV PUSH: CPT

## 2022-09-19 PROCEDURE — 87088 URINE BACTERIA CULTURE: CPT

## 2022-09-19 PROCEDURE — 87086 URINE CULTURE/COLONY COUNT: CPT

## 2022-09-19 PROCEDURE — 2580000003 HC RX 258

## 2022-09-19 PROCEDURE — 82947 ASSAY GLUCOSE BLOOD QUANT: CPT

## 2022-09-19 PROCEDURE — 6360000002 HC RX W HCPCS

## 2022-09-19 PROCEDURE — 85025 COMPLETE CBC W/AUTO DIFF WBC: CPT

## 2022-09-19 PROCEDURE — 83690 ASSAY OF LIPASE: CPT

## 2022-09-19 PROCEDURE — 80053 COMPREHEN METABOLIC PANEL: CPT

## 2022-09-19 PROCEDURE — 96365 THER/PROPH/DIAG IV INF INIT: CPT

## 2022-09-19 PROCEDURE — 1200000000 HC SEMI PRIVATE

## 2022-09-19 PROCEDURE — 84300 ASSAY OF URINE SODIUM: CPT

## 2022-09-19 PROCEDURE — 99285 EMERGENCY DEPT VISIT HI MDM: CPT

## 2022-09-19 PROCEDURE — 87186 SC STD MICRODIL/AGAR DIL: CPT

## 2022-09-19 PROCEDURE — 81001 URINALYSIS AUTO W/SCOPE: CPT

## 2022-09-19 RX ORDER — KETOROLAC TROMETHAMINE 30 MG/ML
30 INJECTION, SOLUTION INTRAMUSCULAR; INTRAVENOUS ONCE
Status: COMPLETED | OUTPATIENT
Start: 2022-09-19 | End: 2022-09-19

## 2022-09-19 RX ORDER — ACETAMINOPHEN 650 MG/1
650 SUPPOSITORY RECTAL EVERY 6 HOURS PRN
Status: DISCONTINUED | OUTPATIENT
Start: 2022-09-19 | End: 2022-09-22 | Stop reason: HOSPADM

## 2022-09-19 RX ORDER — DEXTROSE MONOHYDRATE 100 MG/ML
INJECTION, SOLUTION INTRAVENOUS CONTINUOUS PRN
Status: DISCONTINUED | OUTPATIENT
Start: 2022-09-19 | End: 2022-09-22 | Stop reason: HOSPADM

## 2022-09-19 RX ORDER — SODIUM POLYSTYRENE SULFONATE 15 G/60ML
15 SUSPENSION ORAL; RECTAL
Status: ACTIVE | OUTPATIENT
Start: 2022-09-19 | End: 2022-09-19

## 2022-09-19 RX ORDER — ONDANSETRON 4 MG/1
4 TABLET, ORALLY DISINTEGRATING ORAL EVERY 8 HOURS PRN
Status: DISCONTINUED | OUTPATIENT
Start: 2022-09-19 | End: 2022-09-22 | Stop reason: HOSPADM

## 2022-09-19 RX ORDER — ENOXAPARIN SODIUM 100 MG/ML
30 INJECTION SUBCUTANEOUS 2 TIMES DAILY
Status: DISCONTINUED | OUTPATIENT
Start: 2022-09-19 | End: 2022-09-22 | Stop reason: HOSPADM

## 2022-09-19 RX ORDER — SODIUM CHLORIDE 0.9 % (FLUSH) 0.9 %
5-40 SYRINGE (ML) INJECTION EVERY 12 HOURS SCHEDULED
Status: DISCONTINUED | OUTPATIENT
Start: 2022-09-19 | End: 2022-09-22 | Stop reason: HOSPADM

## 2022-09-19 RX ORDER — INSULIN LISPRO 100 [IU]/ML
0-4 INJECTION, SOLUTION INTRAVENOUS; SUBCUTANEOUS
Status: DISCONTINUED | OUTPATIENT
Start: 2022-09-20 | End: 2022-09-22 | Stop reason: HOSPADM

## 2022-09-19 RX ORDER — ACETAMINOPHEN 325 MG/1
650 TABLET ORAL ONCE
Status: COMPLETED | OUTPATIENT
Start: 2022-09-19 | End: 2022-09-19

## 2022-09-19 RX ORDER — ATORVASTATIN CALCIUM 20 MG/1
20 TABLET, FILM COATED ORAL DAILY
Status: DISCONTINUED | OUTPATIENT
Start: 2022-09-19 | End: 2022-09-22 | Stop reason: HOSPADM

## 2022-09-19 RX ORDER — ONDANSETRON 2 MG/ML
4 INJECTION INTRAMUSCULAR; INTRAVENOUS ONCE
Status: COMPLETED | OUTPATIENT
Start: 2022-09-19 | End: 2022-09-19

## 2022-09-19 RX ORDER — SODIUM CHLORIDE 0.9 % (FLUSH) 0.9 %
5-40 SYRINGE (ML) INJECTION PRN
Status: DISCONTINUED | OUTPATIENT
Start: 2022-09-19 | End: 2022-09-22 | Stop reason: HOSPADM

## 2022-09-19 RX ORDER — ONDANSETRON 2 MG/ML
4 INJECTION INTRAMUSCULAR; INTRAVENOUS EVERY 6 HOURS PRN
Status: DISCONTINUED | OUTPATIENT
Start: 2022-09-19 | End: 2022-09-22 | Stop reason: HOSPADM

## 2022-09-19 RX ORDER — 0.9 % SODIUM CHLORIDE 0.9 %
1000 INTRAVENOUS SOLUTION INTRAVENOUS ONCE
Status: COMPLETED | OUTPATIENT
Start: 2022-09-19 | End: 2022-09-19

## 2022-09-19 RX ORDER — SODIUM CHLORIDE 9 MG/ML
INJECTION, SOLUTION INTRAVENOUS PRN
Status: DISCONTINUED | OUTPATIENT
Start: 2022-09-19 | End: 2022-09-22 | Stop reason: HOSPADM

## 2022-09-19 RX ORDER — SODIUM CHLORIDE 9 MG/ML
INJECTION, SOLUTION INTRAVENOUS CONTINUOUS
Status: DISCONTINUED | OUTPATIENT
Start: 2022-09-19 | End: 2022-09-22 | Stop reason: HOSPADM

## 2022-09-19 RX ORDER — ACETAMINOPHEN 325 MG/1
650 TABLET ORAL EVERY 6 HOURS PRN
Status: DISCONTINUED | OUTPATIENT
Start: 2022-09-19 | End: 2022-09-22 | Stop reason: HOSPADM

## 2022-09-19 RX ORDER — INSULIN LISPRO 100 [IU]/ML
0-4 INJECTION, SOLUTION INTRAVENOUS; SUBCUTANEOUS NIGHTLY
Status: DISCONTINUED | OUTPATIENT
Start: 2022-09-19 | End: 2022-09-22 | Stop reason: HOSPADM

## 2022-09-19 RX ADMIN — SODIUM CHLORIDE 1000 ML: 9 INJECTION, SOLUTION INTRAVENOUS at 18:45

## 2022-09-19 RX ADMIN — ACETAMINOPHEN 650 MG: 325 TABLET ORAL at 18:46

## 2022-09-19 RX ADMIN — CEFTRIAXONE SODIUM 1000 MG: 1 INJECTION, POWDER, FOR SOLUTION INTRAMUSCULAR; INTRAVENOUS at 20:47

## 2022-09-19 RX ADMIN — KETOROLAC TROMETHAMINE 30 MG: 30 INJECTION, SOLUTION INTRAMUSCULAR; INTRAVENOUS at 18:45

## 2022-09-19 RX ADMIN — ONDANSETRON 4 MG: 2 INJECTION INTRAMUSCULAR; INTRAVENOUS at 18:45

## 2022-09-19 ASSESSMENT — ENCOUNTER SYMPTOMS
ABDOMINAL PAIN: 1
CHOKING: 0
CHEST TIGHTNESS: 0
VOMITING: 1
SORE THROAT: 0
NAUSEA: 1
COUGH: 0
BACK PAIN: 1
DIARRHEA: 1
SHORTNESS OF BREATH: 0

## 2022-09-19 ASSESSMENT — PAIN DESCRIPTION - LOCATION: LOCATION: ABDOMEN

## 2022-09-19 ASSESSMENT — PAIN SCALES - GENERAL
PAINLEVEL_OUTOF10: 10
PAINLEVEL_OUTOF10: 7

## 2022-09-19 ASSESSMENT — PAIN - FUNCTIONAL ASSESSMENT: PAIN_FUNCTIONAL_ASSESSMENT: 0-10

## 2022-09-19 NOTE — ED PROVIDER NOTES
[facility]  Emergency Department Encounter  Emergency Medicine Resident     Pt Name:Marilyn Toscano  MRN: [id number]  [place] 1970  Date of evaluation: 9/19/22  PCP:  Tracey Sawyer MD      CHIEF COMPLAINT       Chief Complaint   Patient presents with    Flank Pain       HISTORY OF PRESENT ILLNESS  (Location/Symptom, Timing/Onset, Context/Setting, Quality, Duration, Modifying Factors, Severity.)      Tommy Garza is a 47 y.o. female with PMH of left kidney stone, DM who presents with flank and abdominal pain. Pt states since 9/16/22 she has been having right flank pain radiating to right lower abdominal pain. Today pt states her right lower abdominal is worse. Pt went to PCP's office today to refill meds, complained the pain and recommended to come here due to concerning of kidney stone vs. Appendicitis. Pt states she has been feeling nausea and vomiting everything she ate. Pt also complains of headache, neck pain, and had 1 episode of watery diarrhea today. Denies blurry vision, dizziness, chest pain and shortness of breath. PAST MEDICAL / SURGICAL / SOCIAL / FAMILY HISTORY      has a past medical history of Arthritis, Diabetes mellitus ([address] Hypertension, and Obese. N/A     has a past surgical history that includes hernia repair; Tubal ligation; Upper gastrointestinal endoscopy (N/A, 9/20/2022); and Upper gastrointestinal endoscopy (9/20/2022).   N/A    Social History     Socioeconomic History    Marital status: Single     Spouse name: Not on file    Number of children: Not on file    Years of education: Not on file    Highest education level: Not on file   Occupational History    Not on file   Tobacco Use    Smoking status: Former    Smokeless tobacco: Never   Substance and Sexual Activity    Alcohol use: No    Drug use: No    Sexual activity: Not on file   Other Topics Concern    Not on file   Social History Narrative    Not on file     Social Determinants of Health     Financial Resource Strain: Not on file   Food Insecurity: Not on file   Transportation Needs: Not on file   Physical Activity: Not on file   Stress: Not on file   Social Connections: Not on file   Intimate Partner Violence: Not on file   Housing Stability: Not on file       History reviewed. No pertinent family history. Allergies:  Codeine and Percocet [oxycodone-acetaminophen]    Home Medications:  Prior to Admission medications    Medication Sig Start Date End Date Taking? Authorizing Provider   ciprofloxacin (CIPRO) 500 MG tablet Take 1 tablet by mouth every 12 hours for 9 doses 9/22/22 9/27/22 Yes Dl TRIPLETT Blood, DO   pantoprazole (PROTONIX) 40 MG tablet Take 1 tablet by mouth every morning (before breakfast) 9/23/22  Yes Dl TRIPLETT Blood, DO   meloxicam (MOBIC) 15 MG tablet Take 1 tablet by mouth daily 7/13/22 9/22/22  Alysha Dong,    docusate sodium (COLACE) 100 MG capsule Take 1 capsule by mouth 2 times daily 11/3/17   PATRICIA Torres - CNP   simvastatin (ZOCOR) 40 MG tablet Take 40 mg by mouth nightly    Historical Provider, MD   acetaminophen (TYLENOL) 325 MG tablet Take 650 mg by mouth every 6 hours as needed for Pain    Historical Provider, MD   ondansetron (ZOFRAN) 4 MG tablet Take 1 tablet by mouth every 8 hours as needed for Nausea 5/22/17   Amanda Gotti DO   benzonatate (TESSALON) 100 MG capsule Take 1 capsule by mouth 3 times daily as needed for Cough 5/4/15   Sony Lawrence DO   lisinopril-hydrochlorothiazide (PRINZIDE;ZESTORETIC) 20-12.5 MG per tablet Take 1 tablet by mouth daily  9/22/22  Historical Provider, MD   METFORMIN HCL PO Take 1,000 mg by mouth 2 times daily. Historical Provider, MD   LISINOPRIL PO Take 20 mg by mouth daily. Historical Provider, MD       REVIEW OF SYSTEMS    (2-9 systems for level 4, 10 or more for level 5)      Review of Systems   Constitutional:  Positive for appetite change. Negative for chills and fever. HENT:  Negative for sore throat. Respiratory:  Negative for cough, choking, chest tightness and shortness of breath. Cardiovascular:  Negative for chest pain, palpitations and leg swelling. Gastrointestinal:  Positive for abdominal pain, diarrhea, nausea and vomiting. Genitourinary:  Positive for flank pain. Musculoskeletal:  Positive for back pain. Skin: Negative. Neurological:  Positive for headaches. Negative for dizziness. PHYSICAL EXAM   (up to 7 for level 4, 8 or more for level 5)      INITIAL VITALS:   /71   Pulse 84   Temp 97.7 °F (36.5 °C) (Oral)   Resp 16   Ht 1.499 m   Wt 101.2 kg   SpO2 98%   BMI 45.04 kg/m²     Physical Exam  Constitutional:       General: She is in acute distress. HENT:      Head: Normocephalic and atraumatic. Eyes:      Extraocular Movements: Extraocular movements intact. Pupils: Pupils are equal, round, and reactive to light. Cardiovascular:      Rate and Rhythm: Normal rate and regular rhythm. Pulmonary:      Effort: Pulmonary effort is normal.      Breath sounds: Normal breath sounds. Abdominal:      Palpations: There is no mass. Tenderness: There is abdominal tenderness. There is right CVA tenderness. There is no rebound. Comments: Right lower abdominal and belly button area tenderness, right flank area mild tenderness. Musculoskeletal:      Cervical back: Normal range of motion and neck supple. Neurological:      General: No focal deficit present. Mental Status: She is alert and oriented to person, place, and time.        DIFFERENTIAL  DIAGNOSIS     PLAN (LABS / IMAGING / EKG):  Orders Placed This Encounter   Procedures    Culture, Urine    COVID-19, Rapid    CT ABDOMEN PELVIS WO CONTRAST Additional Contrast? None    MRI ABDOMEN W WO CONTRAST MRCP    US GI ENDOSCOPIC S&I    NM Cardiac Stress Test Nuclear Imaging    Lipase    CBC with Auto Differential    CMP    Urinalysis    Microscopic Urinalysis    Hemoglobin A1c    Comprehensive Metabolic Panel w/ Reflex to MG    Magnesium    Protime-INR    Sodium, urine, random    Protein, urine, random    Surgical Pathology    Surgical Pathology    Cytology, Non-Gyn    SURGICAL PATHOLOGY REPORT    SURGICAL PATHOLOGY REPORT    GASTRIN    Prealbumin    HCG Qualitative, Serum    Inpatient consult to Hospitalist    Inpatient consult to GI    Inpatient consult to General Surgery    Inpatient consult to Cardiology    OT eval and treat    PT eval and treat    Cardiac Stress Test- W Pharm    POC Glucose Fingerstick    POC Glucose Fingerstick    POC Glucose Fingerstick    POC Glucose Fingerstick    POC Glucose Fingerstick    POC Glucose Fingerstick    POC Glucose Fingerstick    EKG 12 Lead    ECHO Complete 2D W Doppler W Color    ADMIT TO INPATIENT    Place in Observation Service    Discharge patient       MEDICATIONS ORDERED:  Orders Placed This Encounter   Medications    ondansetron (ZOFRAN) injection 4 mg    0.9 % sodium chloride bolus    ketorolac (TORADOL) injection 30 mg    acetaminophen (TYLENOL) tablet 650 mg    cefTRIAXone (ROCEPHIN) 1,000 mg in sodium chloride 0.9 % 50 mL IVPB mini-bag     Order Specific Question:   Antimicrobial Indications     Answer:   Urinary Tract Infection    DISCONTD: atorvastatin (LIPITOR) tablet 20 mg    DISCONTD: glucose chewable tablet 16 g    DISCONTD: dextrose bolus 10% 125 mL    DISCONTD: dextrose bolus 10% 250 mL    DISCONTD: glucagon (rDNA) injection 1 mg    DISCONTD: dextrose 10 % infusion    DISCONTD: 0.9 % sodium chloride infusion    DISCONTD: sodium chloride flush 0.9 % injection 5-40 mL    DISCONTD: sodium chloride flush 0.9 % injection 5-40 mL    DISCONTD: 0.9 % sodium chloride infusion    DISCONTD: enoxaparin Sodium (LOVENOX) injection 30 mg     Order Specific Question:   Indication of Use     Answer:   Prophylaxis-DVT/PE    DISCONTD: ondansetron (ZOFRAN-ODT) disintegrating tablet 4 mg    DISCONTD: ondansetron (ZOFRAN) injection 4 mg    DISCONTD: acetaminophen (TYLENOL) tablet 650 mg    DISCONTD: acetaminophen (TYLENOL) suppository 650 mg    sodium polystyrene (KAYEXALATE) 15 GM/60ML suspension 15 g    DISCONTD: insulin lispro (HUMALOG) injection vial 0-4 Units    DISCONTD: insulin lispro (HUMALOG) injection vial 0-4 Units    DISCONTD: cefTRIAXone (ROCEPHIN) 1,000 mg in sterile water 10 mL IV syringe     Order Specific Question:   Antimicrobial Indications     Answer:   Urinary Tract Infection     Order Specific Question:   UTI duration of therapy     Answer:   7 days    DISCONTD: pantoprazole (PROTONIX) 40 mg in sodium chloride (PF) 10 mL injection    gadoteridol (PROHANCE) injection 20 mL    DISCONTD: sodium chloride flush 0.9 % injection 10 mL    DISCONTD: 0.9 % sodium chloride infusion    DISCONTD: lactated ringers infusion    DISCONTD: pantoprazole (PROTONIX) tablet 40 mg    DISCONTD: cefTRIAXone (ROCEPHIN) 1,000 mg in sterile water 10 mL IV syringe     Order Specific Question:   Antimicrobial Indications     Answer:   Urinary Tract Infection     Order Specific Question:   UTI duration of therapy     Answer:   7 days    DISCONTD: ciprofloxacin (CIPRO) tablet 500 mg     Order Specific Question:   Antimicrobial Indications     Answer:   Urinary Tract Infection     Order Specific Question:   UTI duration of therapy     Answer:   7 days    regadenoson (LEXISCAN) injection 0.4 mg    DISCONTD: sodium chloride flush 0.9 % injection 5-40 mL    DISCONTD: 0.9 % sodium chloride infusion    DISCONTD: albuterol sulfate HFA (PROVENTIL;VENTOLIN;PROAIR) 108 (90 Base) MCG/ACT inhaler 2 puff     Order Specific Question:   Initiate RT Bronchodilator Protocol     Answer:   Yes - Inpatient Protocol    DISCONTD: atropine injection 0.5 mg    DISCONTD: nitroGLYCERIN (NITROSTAT) SL tablet 0.4 mg    DISCONTD: metoprolol (LOPRESSOR) injection 5 mg    DISCONTD: aminophylline injection 50 mg    DISCONTD: sodium chloride flush 0.9 % injection 10 mL    DISCONTD: sodium chloride flush 0.9 % injection 10 mL technetium sestamibi (CARDIOLITE) injection 57.9 millicurie    technetium sestamibi (CARDIOLITE) injection 08.6 millicurie    ciprofloxacin (CIPRO) 500 MG tablet     Sig: Take 1 tablet by mouth every 12 hours for 9 doses     Dispense:  9 tablet     Refill:  0    pantoprazole (PROTONIX) 40 MG tablet     Sig: Take 1 tablet by mouth every morning (before breakfast)     Dispense:  30 tablet     Refill:  3       DDX/MDM: Ancelmo Simental is a 47 y.o. female with PMH of left kidney stone, DM who presents with flank and abdominal pain. Based on pt PMH, symptoms and physical exam we are considering UTI vs. Appendicitis vs. Kid stone. We will obtain abdominal CT and labs for further evaluation and management. We will control the pain and nausea as well for now, also IVF. DIAGNOSTIC RESULTS / EMERGENCY DEPARTMENT COURSE / MDM   LAB RESULTS:  Results for orders placed or performed during the hospital encounter of 09/19/22   Culture, Urine    Specimen: Urine, clean catch   Result Value Ref Range    Specimen Description . CLEAN CATCH URINE     Culture ESCHERICHIA COLI >[id number] CFU/ML (A)        Susceptibility    Escherichia coli - BACTERIAL SUSCEPTIBILITY PANEL PACHECO     ampicillin >=32 Resistant      aztreonam <=1 Sensitive      ceFAZolin* <=4 Sensitive       * Cefazolin sensitivity results can be used to predict the effectiveness of oral   cephalosporins (eg. Cephalexin) in uncomplicated Urinary Tract Infections due to E. coli, K.   pneumoniae, and P. mirabilis       cefTRIAXone <=1 Sensitive      ciprofloxacin <=0.25 Sensitive      Confirmatory Extended Spectrum Beta-Lactamase NEGATIVE Negative      gentamicin <=1 Sensitive      nitrofurantoin <=16 Sensitive      tobramycin <=1 Sensitive      trimethoprim-sulfamethoxazole <=20 Sensitive      piperacillin-tazobactam <=4 Sensitive    COVID-19, Rapid    Specimen: Nasopharyngeal Swab   Result Value Ref Range    Specimen Description . NASOPHARYNGEAL SWAB     SARS-CoV-2, Rapid Not Detected Not Detected   Lipase   Result Value Ref Range    Lipase 34 13 - 60 U/L   CBC with Auto Differential   Result Value Ref Range    WBC 9.2 3.5 - 11.3 k/uL    RBC 3.80 (L) 3.95 - 5.11 m/uL    Hemoglobin 12.0 11.9 - 15.1 g/dL    Hematocrit 34.5 (L) 36.3 - 47.1 %    MCV 90.8 82.6 - 102.9 fL    MCH 31.6 25.2 - 33.5 pg    MCHC 34.8 28.4 - 34.8 g/dL    RDW 12.1 11.8 - 14.4 %    Platelets 371 793 - 065 k/uL    MPV 9.3 8.1 - 13.5 fL    NRBC Automated 0.0 0.0 per 100 WBC    Seg Neutrophils 71 (H) 36 - 65 %    Lymphocytes 20 (L) 24 - 43 %    Monocytes 8 3 - 12 %    Eosinophils % 1 1 - 4 %    Basophils 0 0 - 2 %    Immature Granulocytes 0 0 %    Segs Absolute 6.51 1.50 - 8.10 k/uL    Absolute Lymph # 1.82 1.10 - 3.70 k/uL    Absolute Mono # 0.73 0.10 - 1.20 k/uL    Absolute Eos # 0.05 0.00 - 0.44 k/uL    Basophils Absolute 0.03 0.00 - 0.20 k/uL    Absolute Immature Granulocyte 0.03 0.00 - 0.30 k/uL   CMP   Result Value Ref Range    Glucose 133 (H) 70 - 99 mg/dL    BUN 19 6 - 20 mg/dL    Creatinine 1.36 (H) 0.50 - 0.90 mg/dL    Calcium 8.8 8.6 - 10.4 mg/dL    Sodium 134 (L) 135 - 144 mmol/L    Potassium 3.7 3.7 - 5.3 mmol/L    Chloride 97 (L) 98 - 107 mmol/L    CO2 22 20 - 31 mmol/L    Anion Gap 15 9 - 17 mmol/L    Alkaline Phosphatase 129 (H) 35 - 104 U/L    ALT 38 (H) 5 - 33 U/L    AST 34 (H) <32 U/L    Total Bilirubin 0.8 0.3 - 1.2 mg/dL    Total Protein 7.9 6.4 - 8.3 g/dL    Albumin 4.1 3.5 - 5.2 g/dL    Albumin/Globulin Ratio 1.1 1.0 - 2.5    GFR Non- 41 (L) >60 mL/min    GFR  49 (L) >60 mL/min    GFR Comment         Urinalysis   Result Value Ref Range    Color, UA Dark Yellow (A) Yellow    Turbidity UA Cloudy (A) Clear    Glucose, Ur NEGATIVE NEGATIVE    Bilirubin Urine NEGATIVE NEGATIVE    Ketones, Urine TRACE (A) NEGATIVE    Specific Gravity, UA 1.020 1.005 - 1.030    Urine Hgb TRACE (A) NEGATIVE    pH, UA 5.5 5.0 - 8.0    Protein, UA 1+ (A) NEGATIVE    Urobilinogen, Urine Normal Normal    Nitrite, Urine POSITIVE (A) NEGATIVE    Leukocyte Esterase, Urine MODERATE (A) NEGATIVE   Microscopic Urinalysis   Result Value Ref Range    WBC, UA TOO NUMEROUS TO COUNT 0 - 5 /HPF    RBC, UA 2 TO 5 0 - 4 /HPF    Casts UA  0 - 8 /LPF     5 TO 10 HYALINE Reference range defined for non-centrifuged specimen. Epithelial Cells UA 0 TO 2 0 - 5 /HPF    Bacteria, UA MANY (A) None   Hemoglobin A1c   Result Value Ref Range    Hemoglobin A1C 6.7 (H) 4.0 - 6.0 %    Estimated Avg Glucose 146 mg/dL   Comprehensive Metabolic Panel w/ Reflex to MG   Result Value Ref Range    Glucose 198 (H) 70 - 99 mg/dL    BUN 23 (H) 6 - 20 mg/dL    Creatinine 1.33 (H) 0.50 - 0.90 mg/dL    Calcium 7.9 (L) 8.6 - 10.4 mg/dL    Sodium 136 135 - 144 mmol/L    Potassium 3.6 (L) 3.7 - 5.3 mmol/L    Chloride 105 98 - 107 mmol/L    CO2 20 20 - 31 mmol/L    Anion Gap 11 9 - 17 mmol/L    Alkaline Phosphatase 106 (H) 35 - 104 U/L    ALT 30 5 - 33 U/L    AST 26 <32 U/L    Total Bilirubin 0.5 0.3 - 1.2 mg/dL    Total Protein 6.7 6.4 - 8.3 g/dL    Albumin 3.3 (L) 3.5 - 5.2 g/dL    Albumin/Globulin Ratio 1.0 1.0 - 2.5    GFR Non-African American 42 (L) >60 mL/min    GFR  51 (L) >60 mL/min    GFR Comment         Magnesium   Result Value Ref Range    Magnesium 2.0 1.6 - 2.6 mg/dL   Protime-INR   Result Value Ref Range    Protime 10.4 9.1 - 12.3 sec    INR 1.0    Sodium, urine, random   Result Value Ref Range    Sodium,Ur 20 mmol/L   Protein, urine, random   Result Value Ref Range    Total Protein, Urine 45 mg/dL   Cytology, Non-Gyn   Result Value Ref Range    Case Number: [id number]    SURGICAL PATHOLOGY REPORT   Result Value Ref Range    Surgical Pathology Report       -- Diagnosis --  A. FINE NEEDLE ASPIRATION EUS KATHERINE PANCREATIC LYMPH NODE:          NEGATIVE FOR MALIGNANCY.            B.          FINE NEEDLE ASPIRATION EUS PANCREATIC HEAD MASS:         CELLULAR FINDINGS HIGHLY SUSPICIOUS FOR WELL-DIFFERENTIATED  NEUROENDOCRINE TUMOR                 Shabbir Mayank. Baljeet Andrews D.O.  **Electronically Signed Out**         ljf/9/22/2022       Clinical Information  No information given. Source of Specimen  A: FINE NEEDLE ASPIRATION EUS LISA PANCREATIC LYMPH NODE  B: FINE NEEDLE ASPIRATION EUS PANCREATIC HEAD MASS    Gross Description  A. \"LISA PANCREATIC LYMPH NODE\" Specimen received in CytoLyt  solution, light red fluid with small white clots, 1 DQ slide prepared. B. \"PANCREATIC HEAD MASS\" Specimen received in CytoLyt solution,  red cloudy fluid with small clots, 1 DQ slide prepared. IMMEDIATE EVALUATION:    Evaluation episode: A: Lisa pancreatic lymph node:   BABS: Hypocellular specimen, negative for malignancy. Evaluation ep isode: B: Pancreatic head mass:  BABS: Blood only. RITA Munguia DO. MICROSCOPIC DESCRIPTION   A..Direct, smears, a ThinPrep slide and cell block sediment sections  are examined. The specimen is hypocellular and demonstrates rare  aggregates of lymphocytes. Immunostains for pankeratin and  chromogranin and synaptophysin are essentially negative with  appropriate reactive controls. A neoplasm is not identified. B.  Direct smears, a ThinPrep slide and cell block sediment sections  are examined. Fragments of benign pancreatic tissue are noted. Aggregates of small round blue cells are noted that stain positive  with pancytokeratin, synaptophysin and chromogranin (controls  adequate) are also present. 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 Part B of this case was seen in intradepartmental consultation (RAYNA WARREN,  RD) for  purposes. [facility] CONSULTATION       Patient Name: Tk Stephenson: [id number]  Path Number: [id number]    [facility] [address]   [facility], [address]  [phone]  Fax: [phone]     SURGICAL PATHOLOGY REPORT   Result Value Ref Range    Surgical Pathology Report       -- Diagnosis --  A. STOMACH, BIOPSIES:  - MILD TO MODERATE CHRONIC ACTIVE GASTRITIS.  - HELICOBACTER PYLORI IMMUNOSTAIN (CONTROL APPROPRIATE) IS NEGATIVE  FOR   INFECTIOUS BACTERIA. B.  STOMACH, POLYPECTOMY:  - BENIGN HYPERPLASTIC GASTRIC POLYP WITH MARKED ACUTE AND CHRONIC   INFLAMMATION AND FOCAL SURFACE EROSION/ULCERATION. - NEGATIVE FOR HELICOBACTER PYLORI INFECTION. Win Raines. Leila Heredia M.D.  **Electronically Signed Out**         sls/9/21/2022       Clinical Information  Pre-op Diagnosis:  GERD   Operative Findings:  GASTRIC BX; GASTRIC POLYP   Operation Performed:  EGD W/EUS FAN, EGD BIOPSY     Source of Specimen  A: GASTRIC BX  B: GASTRIC BX    Gross Description  A. \"FELIX YOUNGBLOOD, GASTRIC BX\" Three tan-white tissue fragments from  0.3 to 0.4 cm and are 0.7 x 0.3 x 0.2 cm in aggregate. Entirely 1cs. B. \"FELIX YOUNGBLOOD, GASTRIC POLYP\" 0.6 x 0.5 x 0.3 cm polypoid portion  of tan-red tissue, inked and bisected. Entirely 1cs. ep tm      Microscopic Description  A, B. Mi croscopic examination performed. SURGICAL PATHOLOGY CONSULTATION       Patient Name: Melinda Hoffman: [id number]  Path Number: [id number]    [facility] [address]   [place], [address]  [phone]  Fax: [phone]     GASTRIN   Result Value Ref Range    Gastrin 1374 (H) 0 - 100 pg/mL   Prealbumin   Result Value Ref Range    Prealbumin 7.5 (L) 20 - 40 mg/dL   HCG Qualitative, Serum   Result Value Ref Range    hCG Qual NEGATIVE NEGATIVE   POC Glucose Fingerstick   Result Value Ref Range    POC Glucose 141 (H) 65 - 105 mg/dL   POC Glucose Fingerstick   Result Value Ref Range    POC Glucose 161 (H) 65 - 105 mg/dL   POC Glucose Fingerstick   Result Value Ref Range    POC Glucose 182 (H) 65 - 105 mg/dL   POC Glucose Fingerstick Result Value Ref Range    POC Glucose 130 (H) 65 - 105 mg/dL   POC Glucose Fingerstick   Result Value Ref Range    POC Glucose 170 (H) 65 - 105 mg/dL   POC Glucose Fingerstick   Result Value Ref Range    POC Glucose 116 (H) 65 - 105 mg/dL   POC Glucose Fingerstick   Result Value Ref Range    POC Glucose 152 (H) 65 - 105 mg/dL   EKG 12 Lead   Result Value Ref Range    Ventricular Rate 78 BPM    Atrial Rate 78 BPM    P-R Interval 172 ms    QRS Duration 84 ms    Q-T Interval 386 ms    QTc Calculation (Bazett) 440 ms    P Axis 31 degrees       IMPRESSION: Pyelonephritis, CHRISTINE, Pancreatic mass    RADIOLOGY:  NM Cardiac Stress Test Nuclear Imaging   Final Result      Normal study. Risk stratification: Low            US GI ENDOSCOPIC S&I   Final Result      MRI ABDOMEN W WO CONTRAST MRCP   Final Result   1. A 2.6 cm lesion arising from the pancreatic uncinate process has features   highly suggestive of a neuroendocrine neoplasm. Consider endoscopic biopsy   and serum biochemical evaluation. 2. No findings of metastatic disease in the abdomen. 3. Severe hepatic steatosis. CT ABDOMEN PELVIS WO CONTRAST Additional Contrast? None   Final Result   1. Hepatic steatosis. 2. Solid lesion in the pancreatic head is indeterminate. Pancreatic protocol   MRI or CT is recommended for further evaluation. 3. Mildly enlarged periportal lymph node. EKG  N/A    All EKG's are interpreted by the Emergency Department Physician who either signs or Co-signs this chart in the absence of a cardiologist.    EMERGENCY DEPARTMENT COURSE:      -Abdominal CT wo contrast   -CBC, CMP, UA, UCX, Lipase,   -[place]   -IVF   -Zofran    ED Course as of 09/26/22 1420   Mon Sep 19, 2022   1820 Pt temperature is 102.5 F, we will give Tylenol for fever. [YX]   1846 Abdominal CT shows   1. Hepatic steatosis. 2. Solid lesion in the pancreatic head is indeterminate.   Pancreatic protocol  MRI or CT is recommended for further evaluation. 3. Mildly enlarged periportal lymph node. [YX]   2044 Pt lab result shows UTI, increased creatinine, indicate CHRISTINE, probably pyelonephritis, we will give one dose of ceftriaxone 1 g IV, and consult internal medicine. [YX]   2046 Pt  temperature 99.2 F, HR 99 bpm, appears alert. [YX]   2132 Consulted intermediate care, will admit to inpatient for further evaluation and management. [YX]      ED Course User Index  [YX] Palmira Cazares MD       No notes of EC Admission Criteria type on file. PROCEDURES:  N/A    CONSULTS:  IP CONSULT TO HOSPITALIST  IP CONSULT TO GI  IP CONSULT TO GENERAL SURGERY  IP CONSULT TO CARDIOLOGY    CRITICAL CARE:  N/A      FINAL IMPRESSION      1. Acute pyelonephritis    2. Pancreatic mass      1. Acute pyelonephritis    2.  Pancreatic mass    DISPOSITION / PLAN     DISPOSITION Admitted 09/20/2022 04:54:34 AM      PATIENT REFERRED TO:  Agustín Hurtado MD  [address] [facility]  [address]nd [address] [facility] [address]  [facility]  [phone]    Follow up in 1 week(s)      Jeff Pozo MD  [address] [facility]  [address] [facility] [address]  [address]  [phone]    Follow up in 2 week(s)      Rosalie Richardson MD  [place] [address] [address] [facility]  [phone]    Schedule an appointment as soon as possible for a visit  preop testing    DISCHARGE MEDICATIONS:  Discharge Medication List as of 9/22/2022  2:04 PM        START taking these medications    Details   ciprofloxacin (CIPRO) 500 MG tablet Take 1 tablet by mouth every 12 hours for 9 doses, Disp-9 tablet, R-0Normal      pantoprazole (PROTONIX) 40 MG tablet Take 1 tablet by mouth every morning (before breakfast), Disp-30 tablet, R-3Normal             Palmira Cazares MD  Emergency Medicine Resident    (Please note that portions of thisnote were completed with a voice recognition program.  Efforts were made to edit the dictations but occasionally words are mis-transcribed.)      Palmira Cazares MD  Resident  09/19/22 2134

## 2022-09-19 NOTE — ED NOTES
Pt resting on stretcher. A&Ox4. RR even and unlabored. No distress noted. Pt denies any needs at this time. Call light within reach.         Jeri Ross RN  09/19/22 1943

## 2022-09-19 NOTE — ED NOTES
Pt back from [address] [facility], [facility]  09/19/22 4014

## 2022-09-19 NOTE — ED TRIAGE NOTES
Pt arrived to ED 27 via triage. Pt co Rt LQ abdominal pain and Rt sided flank pain x 2 days. Pt denies any vomiting besides when she tries to go to sleep. Pt states that she has had diarrhea since this morning. Pt denies any burning or difficulty urinating. Pt is resting on stretcher with call light within reach. Breathing is non labored and no acute distress is noted.    Will continue to follow plan of care

## 2022-09-19 NOTE — ED PROVIDER NOTES
[facility]  Emergency Department  Faculty Attestation     I performed a history and physical examination of the patient and discussed management with the resident. I reviewed the residents note and agree with the documented findings and plan of care. Any areas of disagreement are noted on the chart. I was personally present for the key portions of any procedures. I have documented in the chart those procedures where I was not present during the key portions. I have reviewed the emergency nurses triage note. I agree with the chief complaint, past medical history, past surgical history, allergies, medications, social and family history as documented unless otherwise noted below. For Physician Assistant/ Nurse Practitioner cases/documentation I have personally evaluated this patient and have completed at least one if not all key elements of the E/M (history, physical exam, and MDM). Additional findings are as noted. Primary Care Physician:  Jos Calderón MD    Screenings:  [unfilled]    CHIEF COMPLAINT       Chief Complaint   Patient presents with    Flank Pain       RECENT VITALS:    ,   ,  ,      LABS:  Labs Reviewed   CULTURE, URINE   LIPASE   CBC WITH AUTO DIFFERENTIAL   COMPREHENSIVE METABOLIC PANEL   URINALYSIS       Radiology  CT ABDOMEN PELVIS WO CONTRAST Additional Contrast? None    (Results Pending)       CRITICAL CARE: There was a high probability of clinically significant/life threatening deterioration in this patient's condition which required my urgent intervention. Total critical care time was none minutes. This excludes any time for separately reportable procedures. EKG:      Attending Physician Additional  Notes    Patient has 3 days of illness with right flank pain, now diffuse abdominal pain including the periumbilical region right lower quadrant and left abdomen. She has urinary frequency but wears a diaper. No dysuria.   She has been having nausea and vomiting every night. She states that when her PCP palpated right lower quadrant she was very tender. No prior abdominal surgery. She has had both kidney stones and UTIs previously. On exam she is uncomfortable vital signs are pending but she has no respiratory distress. There is no CVA tenderness. Abdomen is soft with minimal periumbilical tenderness. No McBurney's point tenderness. Negative Rovsing sign. Negative heeltap psoas and obturator. No Carrasquillo sign. Impression is abdominal pain with vomiting consider stone, pyelonephritis, unlikely appendicitis. Plan is IV access for labs, IV fluids, antiemetics, analgesics, CT abdomen, reassess. Hannah Arita.  Sunshine Gatica MD, [address] [facility],[address]  Attending Emergency  Physician                Fior Huizar MD  09/19/22 [id number]

## 2022-09-20 ENCOUNTER — HOSPITAL ENCOUNTER (OUTPATIENT)
Dept: PHYSICAL THERAPY | Age: 52
Setting detail: THERAPIES SERIES
End: 2022-09-20

## 2022-09-20 ENCOUNTER — ANESTHESIA (OUTPATIENT)
Dept: ENDOSCOPY | Age: 52
End: 2022-09-20
Payer: MEDICARE

## 2022-09-20 ENCOUNTER — ANESTHESIA EVENT (OUTPATIENT)
Dept: ENDOSCOPY | Age: 52
End: 2022-09-20
Payer: MEDICARE

## 2022-09-20 ENCOUNTER — APPOINTMENT (OUTPATIENT)
Dept: MRI IMAGING | Age: 52
End: 2022-09-20
Payer: MEDICARE

## 2022-09-20 ENCOUNTER — APPOINTMENT (OUTPATIENT)
Dept: ULTRASOUND IMAGING | Age: 52
End: 2022-09-20
Payer: MEDICARE

## 2022-09-20 PROBLEM — E66.9 DIABETES MELLITUS TYPE 2 IN OBESE (HCC): Status: ACTIVE | Noted: 2022-09-19

## 2022-09-20 PROBLEM — E11.69 DIABETES MELLITUS TYPE 2 IN OBESE (HCC): Status: ACTIVE | Noted: 2022-09-19

## 2022-09-20 PROBLEM — N10 ACUTE PYELONEPHRITIS: Status: ACTIVE | Noted: 2022-09-19

## 2022-09-20 PROBLEM — R10.9 ABDOMINAL PAIN: Status: ACTIVE | Noted: 2022-09-20

## 2022-09-20 LAB
ALBUMIN SERPL-MCNC: 3.3 G/DL (ref 3.5–5.2)
ALBUMIN/GLOBULIN RATIO: 1 (ref 1–2.5)
ALP BLD-CCNC: 106 U/L (ref 35–104)
ALT SERPL-CCNC: 30 U/L (ref 5–33)
ANION GAP SERPL CALCULATED.3IONS-SCNC: 11 MMOL/L (ref 9–17)
AST SERPL-CCNC: 26 U/L
BILIRUB SERPL-MCNC: 0.5 MG/DL (ref 0.3–1.2)
BUN BLDV-MCNC: 23 MG/DL (ref 6–20)
CALCIUM SERPL-MCNC: 7.9 MG/DL (ref 8.6–10.4)
CASE NUMBER:: NORMAL
CHLORIDE BLD-SCNC: 105 MMOL/L (ref 98–107)
CO2: 20 MMOL/L (ref 20–31)
CREAT SERPL-MCNC: 1.33 MG/DL (ref 0.5–0.9)
ESTIMATED AVERAGE GLUCOSE: 146 MG/DL
GFR AFRICAN AMERICAN: 51 ML/MIN
GFR NON-AFRICAN AMERICAN: 42 ML/MIN
GFR SERPL CREATININE-BSD FRML MDRD: ABNORMAL ML/MIN/{1.73_M2}
GLUCOSE BLD-MCNC: 161 MG/DL (ref 65–105)
GLUCOSE BLD-MCNC: 182 MG/DL (ref 65–105)
GLUCOSE BLD-MCNC: 198 MG/DL (ref 70–99)
HBA1C MFR BLD: 6.7 % (ref 4–6)
INR BLD: 1
MAGNESIUM: 2 MG/DL (ref 1.6–2.6)
POTASSIUM SERPL-SCNC: 3.6 MMOL/L (ref 3.7–5.3)
PROTHROMBIN TIME: 10.4 SEC (ref 9.1–12.3)
SARS-COV-2, RAPID: NOT DETECTED
SODIUM BLD-SCNC: 136 MMOL/L (ref 135–144)
SODIUM,UR: 20 MMOL/L
SPECIMEN DESCRIPTION: NORMAL
TOTAL PROTEIN, URINE: 45 MG/DL
TOTAL PROTEIN: 6.7 G/DL (ref 6.4–8.3)

## 2022-09-20 PROCEDURE — G0378 HOSPITAL OBSERVATION PER HR: HCPCS | Performed by: NURSE PRACTITIONER

## 2022-09-20 PROCEDURE — 96361 HYDRATE IV INFUSION ADD-ON: CPT

## 2022-09-20 PROCEDURE — 3700000000 HC ANESTHESIA ATTENDED CARE: Performed by: INTERNAL MEDICINE

## 2022-09-20 PROCEDURE — 3609020800 HC EGD W/EUS FNA: Performed by: INTERNAL MEDICINE

## 2022-09-20 PROCEDURE — 6370000000 HC RX 637 (ALT 250 FOR IP): Performed by: NURSE PRACTITIONER

## 2022-09-20 PROCEDURE — 6360000002 HC RX W HCPCS: Performed by: INTERNAL MEDICINE

## 2022-09-20 PROCEDURE — 80053 COMPREHEN METABOLIC PANEL: CPT

## 2022-09-20 PROCEDURE — 87635 SARS-COV-2 COVID-19 AMP PRB: CPT

## 2022-09-20 PROCEDURE — 2700000000 HC OXYGEN THERAPY PER DAY

## 2022-09-20 PROCEDURE — 7100000010 HC PHASE II RECOVERY - FIRST 15 MIN: Performed by: INTERNAL MEDICINE

## 2022-09-20 PROCEDURE — 93005 ELECTROCARDIOGRAM TRACING: CPT | Performed by: ANESTHESIOLOGY

## 2022-09-20 PROCEDURE — C9113 INJ PANTOPRAZOLE SODIUM, VIA: HCPCS | Performed by: INTERNAL MEDICINE

## 2022-09-20 PROCEDURE — 88360 TUMOR IMMUNOHISTOCHEM/MANUAL: CPT

## 2022-09-20 PROCEDURE — 2580000003 HC RX 258: Performed by: NURSE PRACTITIONER

## 2022-09-20 PROCEDURE — 6360000004 HC RX CONTRAST MEDICATION: Performed by: INTERNAL MEDICINE

## 2022-09-20 PROCEDURE — 99222 1ST HOSP IP/OBS MODERATE 55: CPT | Performed by: INTERNAL MEDICINE

## 2022-09-20 PROCEDURE — G0378 HOSPITAL OBSERVATION PER HR: HCPCS

## 2022-09-20 PROCEDURE — 88173 CYTOPATH EVAL FNA REPORT: CPT

## 2022-09-20 PROCEDURE — 6360000002 HC RX W HCPCS: Performed by: NURSE ANESTHETIST, CERTIFIED REGISTERED

## 2022-09-20 PROCEDURE — 88305 TISSUE EXAM BY PATHOLOGIST: CPT

## 2022-09-20 PROCEDURE — A9576 INJ PROHANCE MULTIPACK: HCPCS | Performed by: INTERNAL MEDICINE

## 2022-09-20 PROCEDURE — 76975 GI ENDOSCOPIC ULTRASOUND: CPT | Performed by: INTERNAL MEDICINE

## 2022-09-20 PROCEDURE — 83735 ASSAY OF MAGNESIUM: CPT

## 2022-09-20 PROCEDURE — 2580000003 HC RX 258: Performed by: INTERNAL MEDICINE

## 2022-09-20 PROCEDURE — 3700000001 HC ADD 15 MINUTES (ANESTHESIA): Performed by: INTERNAL MEDICINE

## 2022-09-20 PROCEDURE — 84703 CHORIONIC GONADOTROPIN ASSAY: CPT

## 2022-09-20 PROCEDURE — 74183 MRI ABD W/O CNTR FLWD CNTR: CPT

## 2022-09-20 PROCEDURE — 2500000003 HC RX 250 WO HCPCS: Performed by: NURSE ANESTHETIST, CERTIFIED REGISTERED

## 2022-09-20 PROCEDURE — 82947 ASSAY GLUCOSE BLOOD QUANT: CPT

## 2022-09-20 PROCEDURE — 2580000003 HC RX 258: Performed by: STUDENT IN AN ORGANIZED HEALTH CARE EDUCATION/TRAINING PROGRAM

## 2022-09-20 PROCEDURE — 6360000002 HC RX W HCPCS: Performed by: NURSE PRACTITIONER

## 2022-09-20 PROCEDURE — 2720000010 HC SURG SUPPLY STERILE: Performed by: INTERNAL MEDICINE

## 2022-09-20 PROCEDURE — 99225 PR SBSQ OBSERVATION CARE/DAY 25 MINUTES: CPT | Performed by: STUDENT IN AN ORGANIZED HEALTH CARE EDUCATION/TRAINING PROGRAM

## 2022-09-20 PROCEDURE — 88172 CYTP DX EVAL FNA 1ST EA SITE: CPT

## 2022-09-20 PROCEDURE — 2709999900 HC NON-CHARGEABLE SUPPLY: Performed by: INTERNAL MEDICINE

## 2022-09-20 PROCEDURE — 36415 COLL VENOUS BLD VENIPUNCTURE: CPT

## 2022-09-20 PROCEDURE — 3609012400 HC EGD TRANSORAL BIOPSY SINGLE/MULTIPLE: Performed by: INTERNAL MEDICINE

## 2022-09-20 PROCEDURE — 88341 IMHCHEM/IMCYTCHM EA ADD ANTB: CPT

## 2022-09-20 PROCEDURE — 83036 HEMOGLOBIN GLYCOSYLATED A1C: CPT

## 2022-09-20 PROCEDURE — 88342 IMHCHEM/IMCYTCHM 1ST ANTB: CPT

## 2022-09-20 PROCEDURE — 85610 PROTHROMBIN TIME: CPT

## 2022-09-20 PROCEDURE — 7100000011 HC PHASE II RECOVERY - ADDTL 15 MIN: Performed by: INTERNAL MEDICINE

## 2022-09-20 PROCEDURE — A4216 STERILE WATER/SALINE, 10 ML: HCPCS | Performed by: INTERNAL MEDICINE

## 2022-09-20 PROCEDURE — 96372 THER/PROPH/DIAG INJ SC/IM: CPT

## 2022-09-20 RX ORDER — LIDOCAINE HYDROCHLORIDE 10 MG/ML
INJECTION, SOLUTION EPIDURAL; INFILTRATION; INTRACAUDAL; PERINEURAL PRN
Status: DISCONTINUED | OUTPATIENT
Start: 2022-09-20 | End: 2022-09-20 | Stop reason: SDUPTHER

## 2022-09-20 RX ORDER — ONDANSETRON 2 MG/ML
INJECTION INTRAMUSCULAR; INTRAVENOUS PRN
Status: DISCONTINUED | OUTPATIENT
Start: 2022-09-20 | End: 2022-09-20 | Stop reason: SDUPTHER

## 2022-09-20 RX ORDER — PROPOFOL 10 MG/ML
INJECTION, EMULSION INTRAVENOUS PRN
Status: DISCONTINUED | OUTPATIENT
Start: 2022-09-20 | End: 2022-09-20 | Stop reason: SDUPTHER

## 2022-09-20 RX ORDER — SODIUM CHLORIDE, SODIUM LACTATE, POTASSIUM CHLORIDE, CALCIUM CHLORIDE 600; 310; 30; 20 MG/100ML; MG/100ML; MG/100ML; MG/100ML
INJECTION, SOLUTION INTRAVENOUS CONTINUOUS
Status: DISCONTINUED | OUTPATIENT
Start: 2022-09-20 | End: 2022-09-21

## 2022-09-20 RX ORDER — GLYCOPYRROLATE 0.2 MG/ML
INJECTION INTRAMUSCULAR; INTRAVENOUS PRN
Status: DISCONTINUED | OUTPATIENT
Start: 2022-09-20 | End: 2022-09-20 | Stop reason: SDUPTHER

## 2022-09-20 RX ORDER — SODIUM CHLORIDE 0.9 % (FLUSH) 0.9 %
10 SYRINGE (ML) INJECTION PRN
Status: DISCONTINUED | OUTPATIENT
Start: 2022-09-20 | End: 2022-09-22 | Stop reason: HOSPADM

## 2022-09-20 RX ORDER — SODIUM CHLORIDE 9 MG/ML
INJECTION, SOLUTION INTRAVENOUS CONTINUOUS
Status: DISCONTINUED | OUTPATIENT
Start: 2022-09-20 | End: 2022-09-22 | Stop reason: HOSPADM

## 2022-09-20 RX ADMIN — LIDOCAINE HYDROCHLORIDE 50 MG: 10 INJECTION, SOLUTION EPIDURAL; INFILTRATION; INTRACAUDAL; PERINEURAL at 11:18

## 2022-09-20 RX ADMIN — GLYCOPYRROLATE 0.2 MG: 0.2 INJECTION INTRAMUSCULAR; INTRAVENOUS at 11:18

## 2022-09-20 RX ADMIN — PROPOFOL 50 MG: 10 INJECTION, EMULSION INTRAVENOUS at 11:18

## 2022-09-20 RX ADMIN — SODIUM CHLORIDE: 9 INJECTION, SOLUTION INTRAVENOUS at 09:58

## 2022-09-20 RX ADMIN — SODIUM CHLORIDE 40 MG: 9 INJECTION INTRAMUSCULAR; INTRAVENOUS; SUBCUTANEOUS at 13:51

## 2022-09-20 RX ADMIN — ONDANSETRON 4 MG: 2 INJECTION INTRAMUSCULAR; INTRAVENOUS at 11:18

## 2022-09-20 RX ADMIN — GADOTERIDOL 20 ML: 279.3 INJECTION, SOLUTION INTRAVENOUS at 08:38

## 2022-09-20 RX ADMIN — PROPOFOL 100 MCG/KG/MIN: 10 INJECTION, EMULSION INTRAVENOUS at 11:19

## 2022-09-20 RX ADMIN — SODIUM CHLORIDE, POTASSIUM CHLORIDE, SODIUM LACTATE AND CALCIUM CHLORIDE: 600; 310; 30; 20 INJECTION, SOLUTION INTRAVENOUS at 20:17

## 2022-09-20 RX ADMIN — SODIUM CHLORIDE: 9 INJECTION, SOLUTION INTRAVENOUS at 00:04

## 2022-09-20 RX ADMIN — ATORVASTATIN CALCIUM 20 MG: 20 TABLET, FILM COATED ORAL at 20:23

## 2022-09-20 RX ADMIN — SODIUM CHLORIDE: 9 INJECTION, SOLUTION INTRAVENOUS at 12:03

## 2022-09-20 RX ADMIN — ENOXAPARIN SODIUM 30 MG: 100 INJECTION SUBCUTANEOUS at 20:17

## 2022-09-20 RX ADMIN — Medication 10 ML: at 00:10

## 2022-09-20 ASSESSMENT — PAIN - FUNCTIONAL ASSESSMENT: PAIN_FUNCTIONAL_ASSESSMENT: 0-10

## 2022-09-20 ASSESSMENT — PAIN DESCRIPTION - DESCRIPTORS: DESCRIPTORS: PINS AND NEEDLES

## 2022-09-20 NOTE — ED NOTES
Pt resting on stretcher. A&Ox4. RR even and unlabored. No distress noted. Pt denies any needs at this time. Call light within reach.         Tricia Dee RN  09/20/22 [id number]

## 2022-09-20 NOTE — PROGRESS NOTES
Patient tolerated procedure well. VSS as charted. Report called to Alistair Shock. Patient resting without distress.

## 2022-09-20 NOTE — ED NOTES
MRI called for test time, tech stated she will call for transport now.      Cleo Sams RN  09/20/22 [id number]

## 2022-09-20 NOTE — ANESTHESIA PRE PROCEDURE
Department of Anesthesiology  Preprocedure Note       Name:  Hemal Saldivar   Age:  47 y.o.  :  1970                                          MRN:  [id number]         Date:  2022      Surgeon: Lonna Frankel):  Corrine Guevara MD    Procedure: Procedure(s):  ENDOSCOPIC ULTRASOUND    Medications prior to admission:   Prior to Admission medications    Medication Sig Start Date End Date Taking? Authorizing Provider   meloxicam (MOBIC) 15 MG tablet Take 1 tablet by mouth daily 22   Erwin Garcia DO   docusate sodium (COLACE) 100 MG capsule Take 1 capsule by mouth 2 times daily 11/3/17   Oneyda Simmons, APRN - CNP   simvastatin (ZOCOR) 40 MG tablet Take 40 mg by mouth nightly    Historical Provider, MD   meloxicam (MOBIC) 15 MG tablet Take 15 mg by mouth daily    Historical Provider, MD   acetaminophen (TYLENOL) 325 MG tablet Take 650 mg by mouth every 6 hours as needed for Pain    Historical Provider, MD   ondansetron (ZOFRAN) 4 MG tablet Take 1 tablet by mouth every 8 hours as needed for Nausea 17   Lonnie [place], DO   ondansetron [facility]) 4 MG tablet Take 1 tablet by mouth every 8 hours as needed for Nausea 16   Gilford Harman, MD PhD   lisinopril-hydrochlorothiazide (PRINZIDE;ZESTORETIC) 20-12.5 MG per tablet Take 1 tablet by mouth daily    Historical Provider, MD   benzonatate (TESSALON) 100 MG capsule Take 1 capsule by mouth 3 times daily as needed for Cough 5/4/15   yal Client, DO   METFORMIN HCL PO Take 1,000 mg by mouth 2 times daily. Historical Provider, MD   LISINOPRIL PO Take 20 mg by mouth daily.     Historical Provider, MD       Current medications:    Current Facility-Administered Medications   Medication Dose Route Frequency Provider Last Rate Last Admin    [MAR Hold] cefTRIAXone (ROCEPHIN) 1,000 mg in sterile water 10 mL IV syringe  1,000 mg IntraVENous Q24H Radha Subramanian MD        [facility] Hold] pantoprazole (PROTONIX) 40 mg in sodium chloride (PF) 10 mL injection 40 mg IntraVENous Daily Nicole Slater MD        [facility] Hold] sodium chloride flush 0.9 % injection 10 mL  10 mL IntraVENous PRN Nicole Slater MD        [facility] Hold] 0.9 % sodium chloride infusion   IntraVENous Continuous Vira Cruz MD 30 mL/hr at 09/20/22 0958 New Bag at 09/20/22 0958    atorvastatin (LIPITOR) tablet 20 mg  20 mg Oral Daily Dairl Potter, APRN - CNP        glucose chewable tablet 16 g  4 tablet Oral PRN Dairl Potter, APRN - CNP        dextrose bolus 10% 125 mL  125 mL IntraVENous PRN Dairl Potter, APRN - CNP        Or    dextrose bolus 10% 250 mL  250 mL IntraVENous PRN Dairl Potter, APRN - CNP        glucagon (rDNA) injection 1 mg  1 mg SubCUTAneous PRN Dairl Potter, APRN - CNP        dextrose 10 % infusion   IntraVENous Continuous PRN Dairl Potter, APRN - CNP        0.9 % sodium chloride infusion   IntraVENous Continuous Dairl Potter, APRN - CNP 75 mL/hr at 09/20/22 0004 New Bag at 09/20/22 0004    sodium chloride flush 0.9 % injection 5-40 mL  5-40 mL IntraVENous 2 times per day Dairl Potter, APRN - CNP        sodium chloride flush 0.9 % injection 5-40 mL  5-40 mL IntraVENous PRN Dairl Potter, APRN - CNP        0.9 % sodium chloride infusion   IntraVENous PRN Dairl Potter, APRN - CNP        enoxaparin Sodium (LOVENOX) injection 30 mg  30 mg SubCUTAneous BID Dairl Potter, APRN - CNP        ondansetron (ZOFRAN-ODT) disintegrating tablet 4 mg  4 mg Oral Q8H PRN Dairl Potter, APRN - CNP        Or    ondansetron [facility]) injection 4 mg  4 mg IntraVENous Q6H PRN Dairl Potter, APRN - CNP        acetaminophen (TYLENOL) tablet 650 mg  650 mg Oral Q6H PRN Dairl Potter, APRN - CNP        Or    acetaminophen (TYLENOL) suppository 650 mg  650 mg Rectal Q6H PRN Dairl Potter, APRN - CNP        insulin lispro (HUMALOG) injection vial 0-4 Units  0-4 Units SubCUTAneous TID WC Zenovia Labella, APRN - CNP        insulin lispro (HUMALOG) injection vial 0-4 Units  0-4 Units SubCUTAneous Nightly Zenovia Labella, APRN - CNP           Allergies: Allergies   Allergen Reactions    Codeine     Percocet [Oxycodone-Acetaminophen]        Problem List:    Patient Active Problem List   Diagnosis Code    Pyelonephritis N12    Diabetes mellitus type 2 in obese (HCC) E11.69, E66.9    Pancreatic mass K86.89    Generalized abdominal pain R10.84    Acute cystitis N30.00    Hyponatremia E87.1    High anion gap metabolic acidosis L77.2    Renal insufficiency N28.9    Abdominal pain R10.9       Past Medical History:        Diagnosis Date    Arthritis     Diabetes mellitus ([address]     Hypertension     Obese        Past Surgical History:        Procedure Laterality Date    HERNIA REPAIR      TUBAL LIGATION         Social History:    Social History     Tobacco Use    Smoking status: Former    Smokeless tobacco: Never   Substance Use Topics    Alcohol use:  No                                Counseling given: Not Answered      Vital Signs (Current):   Vitals:    09/20/22 0647 09/20/22 0732 09/20/22 0928 09/20/22 0950   BP: 104/69 83/74 129/84 108/71   Pulse: 73 74 75 76   Resp: 19 16 16 14   Temp:   97.9 °F (36.6 °C) 96.9 °F (36.1 °C)   TempSrc:   Oral Infrared   SpO2: 96% 95% 98% 97%   Weight:       Height:                                                  BP Readings from Last 3 Encounters:   09/20/22 108/71   11/03/17 118/81   05/22/17 127/85       NPO Status: Time of last liquid consumption: 2200                        Time of last solid consumption: 2200                        Date of last liquid consumption: 09/19/22                        Date of last solid food consumption: 09/19/22    BMI:   Wt Readings from Last 3 Encounters:   09/19/22 223 lb (101.2 kg)   09/20/22 223 lb (101.2 kg)   09/07/22 223 lb (101.2 kg)     Body mass index is 45.04 kg/m².     CBC:   Lab Results   Component Value Date/Time    WBC 9.2 09/19/2022 06:40 PM    RBC 3.80 09/19/2022 06:40 PM    RBC 3.73 05/07/2012 10:45 AM    HGB 12.0 09/19/2022 06:40 PM    HCT 34.5 09/19/2022 06:40 PM    MCV 90.8 09/19/2022 06:40 PM    RDW 12.1 09/19/2022 06:40 PM     09/19/2022 06:40 PM     05/07/2012 10:45 AM       CMP:   Lab Results   Component Value Date/Time     09/20/2022 04:31 AM    K 3.6 09/20/2022 04:31 AM     09/20/2022 04:31 AM    CO2 20 09/20/2022 04:31 AM    BUN 23 09/20/2022 04:31 AM    CREATININE 1.33 09/20/2022 04:31 AM    GFRAA 51 09/20/2022 04:31 AM    LABGLOM 42 09/20/2022 04:31 AM    GLUCOSE 198 09/20/2022 04:31 AM    GLUCOSE 104 05/07/2012 10:45 AM    PROT 6.7 09/20/2022 04:31 AM    CALCIUM 7.9 09/20/2022 04:31 AM    BILITOT 0.5 09/20/2022 04:31 AM    ALKPHOS 106 09/20/2022 04:31 AM    AST 26 09/20/2022 04:31 AM    ALT 30 09/20/2022 04:31 AM       POC Tests:   Recent Labs     09/19/22  2326   POCGLU 141*       Coags:   Lab Results   Component Value Date/Time    PROTIME 10.4 09/20/2022 04:31 AM    INR 1.0 09/20/2022 04:31 AM       HCG (If Applicable): No results found for: PREGTESTUR, PREGSERUM, HCG, HCGQUANT     ABGs: No results found for: PHART, PO2ART, HEA2WEQ, YQF1WIN, BEART, Y1RUVVPW     Type & Screen (If Applicable):  No results found for: LABABO, LABRH    Drug/Infectious Status (If Applicable):  No results found for: HIV, HEPCAB    COVID-19 Screening (If Applicable):   Lab Results   Component Value Date/Time    COVID19 Not Detected 09/20/2022 05:25 AM           Anesthesia Evaluation  Patient summary reviewed no history of anesthetic complications:   Airway: Mallampati: II  TM distance: >3 FB   Neck ROM: full  Mouth opening: > = 3 FB   Dental:          Pulmonary:Negative Pulmonary ROS and normal exam                               Cardiovascular:    (+) hypertension: no interval change,       ECG reviewed  Rhythm: regular  Rate: normal                    Neuro/Psych:   Negative Neuro/Psych ROS              GI/Hepatic/Renal:   (+) morbid obesity          Endo/Other:    (+) DiabetesType II DM, no interval change, , .                 Abdominal:   (+) obese,           Vascular: negative vascular ROS. Other Findings:           Anesthesia Plan      MAC     ASA 3       Induction: intravenous. Anesthetic plan and risks discussed with patient. Plan discussed with CRNA.                     Phil Pack MD   9/20/2022

## 2022-09-20 NOTE — ED NOTES
Pt resting on stretcher. A&Ox4. RR even and unlabored. No distress noted. Pt denies any needs at this time. Call light within reach.         Alyse Martinez RN  09/20/22 0613

## 2022-09-20 NOTE — CONSULTS
Surgical Oncology:  Consult Note        PATIENT NAME: Joon Valdez OF BIRTH: 1970    ADMISSION DATE: 9/19/2022  5:23 PM     Admitting Provider: Bella Heller Physician: Dr. Sana Ritter DATE: 9/20/2022    Chief Complaint:  Lower right abdominal pain  Consult Regarding: Mass in Uncinate process of pancreas    HISTORY OF PRESENT ILLNESS:  The patient is a 47 y.o. female  who was admitted to the hospital after presenting to the emergency department for right lower quadrant pain. Patient was found to have an CHRISTINE and a UTI. Patient was incidentally found to have a mass in the uncinate process of the pancreas. MRI showed T2 and T1 enhancement consistent with neuroendocrine tumor. Patient has medical history of DM, HLD, COPD, hypertension, arthritis. Surgical history of hernia repair and tubal ligation. Although patient recently had 1 episode of watery diarrhea she typically has 1 bowel movement per day which is solid. Denies dark sticky stools or any blood in her stool. Patient states she often has heartburn/acid reflux. Patient's mobility is severely limited by her arthritis and her COPD. She no longer works due to these elements. She uses a mobility scooter. She is able to complete some tasks around the house. Patient denies family history of pancreatic, gastric, or colon cancer.       Past Medical History:        Diagnosis Date    Arthritis     Diabetes mellitus ([address]     Hypertension     Obese        Past Surgical History:        Procedure Laterality Date    HERNIA REPAIR      TUBAL LIGATION         Medications Prior to Admission:   Medications Prior to Admission: meloxicam (MOBIC) 15 MG tablet, Take 1 tablet by mouth daily  docusate sodium (COLACE) 100 MG capsule, Take 1 capsule by mouth 2 times daily  simvastatin (ZOCOR) 40 MG tablet, Take 40 mg by mouth nightly  meloxicam (MOBIC) 15 MG tablet, Take 15 mg by mouth daily  acetaminophen (TYLENOL) 325 MG tablet, Take 650 mg by mouth every 6 hours as needed for Pain  ondansetron (ZOFRAN) 4 MG tablet, Take 1 tablet by mouth every 8 hours as needed for Nausea  ondansetron (ZOFRAN) 4 MG tablet, Take 1 tablet by mouth every 8 hours as needed for Nausea  lisinopril-hydrochlorothiazide (PRINZIDE;ZESTORETIC) 20-12.5 MG per tablet, Take 1 tablet by mouth daily  benzonatate (TESSALON) 100 MG capsule, Take 1 capsule by mouth 3 times daily as needed for Cough  METFORMIN HCL PO, Take 1,000 mg by mouth 2 times daily. LISINOPRIL PO, Take 20 mg by mouth daily. Allergies:  Codeine and Percocet [oxycodone-acetaminophen]    Social History:   Social History     Socioeconomic History    Marital status: Single     Spouse name: Not on file    Number of children: Not on file    Years of education: Not on file    Highest education level: Not on file   Occupational History    Not on file   Tobacco Use    Smoking status: Former    Smokeless tobacco: Never   Substance and Sexual Activity    Alcohol use: No    Drug use: No    Sexual activity: Not on file   Other Topics Concern    Not on file   Social History Narrative    Not on file     Social Determinants of Health     Financial Resource Strain: Not on file   Food Insecurity: Not on file   Transportation Needs: Not on file   Physical Activity: Not on file   Stress: Not on file   Social Connections: Not on file   Intimate Partner Violence: Not on file   Housing Stability: Not on file       Family History:   History reviewed. No pertinent family history. REVIEW OF SYSTEMS:    CONSTITUTIONAL: Denies recent weight loss, fatigue, fevers, chills. HEENT: Denies rhinorrhea, dysphagia, odynphagia. CARDIOVASCULAR: Denies history of MI, recent chest pain. RESPIRATORY: Positive for shortness of breath  GASTROINTESTINAL: Positive for pain, negative for nausea, emesis, frequent diarrhea  GENITOURINARY: Positive for right flank pain  HEMATOLOGIC/LYMPHATIC: Denies history of anemia or DVTs.   ENDOCRINE: Positive for diabetes  NEURO: Denies history of CVA, TIA. Review of systems negative unless listed above. PHYSICAL EXAM:    VITALS:  /86   Pulse 86   Temp 97.3 °F (36.3 °C) (Oral)   Resp 18   Ht 4' 11\" (1.499 m)   Wt 223 lb (101.2 kg)   SpO2 99%   BMI 45.04 kg/m²   INTAKE/OUTPUT:     Intake/Output Summary (Last 24 hours) at 9/20/2022 1521  Last data filed at 9/20/2022 1203  Gross per 24 hour   Intake 2062.23 ml   Output --   Net 2062.23 ml       CONSTITUTIONAL:  awake, alert, not distressed and morbidly obese  HEENT: Normocephalic/atraumatic, without obvious abnormality. NECK:  Supple, symmetrical, trachea midline   CARDIOVASCULAR: Regular rate and rhythm without murmurs. LUNGS: Good chest rise bilaterally, no accessory muscle use  ABDOMEN: Soft, not currently tender, not distended  MUSCULOSKELETAL: Muscle strength intact in all extremities bilaterally. NEUROLOGIC: CN II- XII intact. Gross motor intact without focal weakness.   SKIN: No cyanosis, rashes  Orientation:   oriented to person, place, and time    CBC with Differential:    Lab Results   Component Value Date/Time    WBC 9.2 09/19/2022 06:40 PM    RBC 3.80 09/19/2022 06:40 PM    RBC 3.73 05/07/2012 10:45 AM    HGB 12.0 09/19/2022 06:40 PM    HCT 34.5 09/19/2022 06:40 PM     09/19/2022 06:40 PM     05/07/2012 10:45 AM    MCV 90.8 09/19/2022 06:40 PM    MCH 31.6 09/19/2022 06:40 PM    MCHC 34.8 09/19/2022 06:40 PM    RDW 12.1 09/19/2022 06:40 PM    LYMPHOPCT 20 09/19/2022 06:40 PM    MONOPCT 8 09/19/2022 06:40 PM    BASOPCT 0 09/19/2022 06:40 PM    MONOSABS 0.73 09/19/2022 06:40 PM    LYMPHSABS 1.82 09/19/2022 06:40 PM    EOSABS 0.05 09/19/2022 06:40 PM    BASOSABS 0.03 09/19/2022 06:40 PM    DIFFTYPE NOT REPORTED 04/07/2017 11:10 AM     BMP:    Lab Results   Component Value Date/Time     09/20/2022 04:31 AM    K 3.6 09/20/2022 04:31 AM     09/20/2022 04:31 AM    CO2 20 09/20/2022 04:31 AM    BUN 23 09/20/2022 04:31 AM LABALBU 3.3 09/20/2022 04:31 AM    LABALBU 4.4 05/07/2012 10:45 AM    CREATININE 1.33 09/20/2022 04:31 AM    CALCIUM 7.9 09/20/2022 04:31 AM    GFRAA 51 09/20/2022 04:31 AM    LABGLOM 42 09/20/2022 04:31 AM    GLUCOSE 198 09/20/2022 04:31 AM    GLUCOSE 104 05/07/2012 10:45 AM       Pertinent Radiology:   CT ABDOMEN PELVIS WO CONTRAST Additional Contrast? None    Result Date: 9/19/2022  EXAMINATION: CT OF THE ABDOMEN AND PELVIS WITHOUT CONTRAST 9/19/2022 6:07 pm TECHNIQUE: CT of the abdomen and pelvis was performed without the administration of intravenous contrast. Multiplanar reformatted images are provided for review. Automated exposure control, iterative reconstruction, and/or weight based adjustment of the mA/kV was utilized to reduce the radiation dose to as low as reasonably achievable. COMPARISON: 04/27/2016 HISTORY: ORDERING SYSTEM PROVIDED HISTORY: ABDMONIAL PAIN TECHNOLOGIST PROVIDED HISTORY: ABDMONIAL PAIN Decision Support Exception - unselect if not a suspected or confirmed emergency medical condition->Emergency Medical Condition (MA) Is the patient pregnant?->No FINDINGS: Lower Chest: Subsegmental atelectasis in the lung bases. Coronary artery atherosclerosis. Organs: Liver is diffusely hypoattenuating with fatty sparing along the gallbladder fossa. Within the limitations of a noncontrast examination, no acute abnormality within the spleen, gallbladder, pancreas, or adrenal glands. There is a 2.5 x 2.3 cm solid-appearing lesion in the pancreatic head. No nephrolithiasis or hydronephrosis. GI/Bowel: Stomach is nondistended. The small bowel is nondilated. The appendix is normal in caliber. The colon is nondilated. Pelvis: The bladder is nondistended. No vesicular stone. The uterus is present. Peritoneum/Retroperitoneum: No ascites or pneumoperitoneum. Abdominal aorta is normal in caliber. There is a retroaortic left renal vein. There are shotty mesenteric and retroperitoneal lymph nodes. There is a mildly enlarged periportal node. Bones/Soft Tissues: Degenerative changes of the lower lumbar spine. 1. Hepatic steatosis. 2. Solid lesion in the pancreatic head is indeterminate. Pancreatic protocol MRI or CT is recommended for further evaluation. 3. Mildly enlarged periportal lymph node. US GI ENDOSCOPIC S&I    Result Date: 9/20/2022  Radiology exam is complete. No Radiologist dictation. Please follow up with ordering provider. MRI ABDOMEN W WO CONTRAST MRCP    Result Date: 9/20/2022  EXAMINATION: MRI OF THE ABDOMEN WITH AND WITHOUT CONTRAST AND MRCP 9/20/2022 8:10 am TECHNIQUE: Multiplanar multisequence MRI of the abdomen was performed with and without the administration of intravenous contrast. COMPARISON: Abdomen and pelvis CTs dating to 04/04/2013 HISTORY: ORDERING SYSTEM PROVIDED HISTORY: eval panc mass, abdominal pain, fever TECHNOLOGIST PROVIDED HISTORY: eval panc mass, abdominal pain, fever What is the sedation requirement?->None Reason for Exam: eval panc mass, abdominal pain, fever FINDINGS: Patient motion in some areas, partially limiting evaluation of those areas. LOWER CHEST:  [place] dependent atelectasis in the bilateral lower lobes on some sequences. Normal heart size. No pleural nor pericardial effusions. LIVER:  Mildly enlarged with the right hemiliver possibly accentuated by a normal variant Riedel's lobe. Severe steatosis with no findings of cirrhosis. GALLBLADDER/BILE DUCTS:  Normal gallbladder. No intrahepatic nor extrahepatic biliary dilation. No obvious ductal filling defects nor strictures. PANCREAS:  Typical duct configuration without dilation. No obvious ductal filling defects nor strictures. Mildly atrophic parenchyma. 2.6 cm x 2.2 cm x 2.3 cm T2 hyperintense, T1 intermediate lesion in the uncinate process with diffuse hyperenhancement on all postcontrast sequences and no significant diffusion restriction.  SPLEEN:  Normal. ADRENAL GLANDS:  Normal. KIDNEYS:  Bilateral renal peripelvic cysts, more prominent on the left. GI/BOWEL:  Normal course and caliber of the stomach, small bowel, and colon without obstruction. Normal appendix with retrocecal location. PELVIS:  Not included. PERITONEUM/RETROPERITONEUM:  Long-term stability of mildly enlarged portohepatic and portocaval lymph nodes, likely reactive. Long-term stability of nonenlarged to mildly enlarged mesenteric nodes with subtle mesenteric stranding, also likely reactive though the appearance could be seen with mesenteric panniculitis or other etiologies. No free intraperitoneal fluid. VESSELS:  Typical arterial anatomy. Patent veins. SOFT TISSUES/BONES:  Laxity of the anterior and lateral abdominal wall musculature with diastasis recti. No suspicious marrow lesions. Multilevel degenerative disc disease. 1. A 2.6 cm lesion arising from the pancreatic uncinate process has features highly suggestive of a neuroendocrine neoplasm. Consider endoscopic biopsy and serum biochemical evaluation. 2. No findings of metastatic disease in the abdomen. 3. Severe hepatic steatosis.          ASSESSMENT:  Active Hospital Problems    Diagnosis Date Noted    Abdominal pain [R10.9] 09/20/2022     Priority: Medium    Acute pyelonephritis [N10] 09/19/2022     Priority: Medium    Diabetes mellitus type 2 in obese ([facility] [address] [E11.69, E66.9] 09/19/2022     Priority: Medium    Pancreatic mass [K86.89] 09/19/2022     Priority: Medium    Generalized abdominal pain [R10.84] 09/19/2022     Priority: Medium    Acute cystitis [N30.00] 09/19/2022     Priority: Medium    Hyponatremia [E87.1] 09/19/2022     Priority: Medium    High anion gap metabolic acidosis [K30.6] 09/19/2022     Priority: Medium    Renal insufficiency [N28.9] 09/19/2022     Priority: Medium       24-year-old female with CHRISTINE, UTI, incidentally found mass in the uncinate process of the pancreas    Plan:  Continue medical mgmt and supportive care per primary  NPO  Want to get a fasting gastrin level in the a.m.   Antibiotics for UTI  Follow-up pathology result from GI [facility]  Will Cont to follow    Discussed with Dr. Shabana Sanches      Electronically signed by Audrey Manuel DO  on 9/20/2022 at 3:21 PM

## 2022-09-20 NOTE — H&P
Saturday, Sunday. States \"I have not gone to bed yet so I have not vomited yet tonight\". Symptoms of emesis occur when she is sleeping but denies any concern for aspiration. States she wakes up with vomit on her. She does describe anorexia with poor p.o. intake x3 days. She denies any food triggers contributing to her abdominal pain stating that it waxed and waned in intensity lasting several hours initially on Friday and Saturday with more progressive constant pain on Sunday prior to arrival.  Denies radiation. Denies history of malignancy. Denies change in bowel pattern. Denies rectal bleeding/melena. Patient asked multiple times and denies flank pain during my evaluation however per ED documentation did initially describe right flank pain. She denies any urinary symptoms, no dysuria hematuria frequency urgency but was noted to have strong smelling urine in ED with bacteriuria/suspected pyelonephritis with fevers. Review of systems is limited as patient is a poor historian. Patient asked multiple times and initially denied the symptoms however states \"I was not listening\". Then states that she has difficulty hearing despite answering the questions yes or no appropriately. After multiple repeated questioning patient was able to articulate her abdominal symptoms. However she was recently treated for an external left ear infection, treated with eardrops. Patient does have remote history of using Barney pins to clear out her eardrums but denies any more recent mechanical irritation to her eardrums with use of either Q-tips or any other foreign objects. Denies swimming. States that she did have drainage from her ears a few days ago. Denies sinus congestion sore throat or URI symptoms. Denies cough congestion denies vertiginous symptoms. Does have a mild occipital headache.       Past Medical History:     Past Medical History:   Diagnosis Date    Arthritis     Diabetes mellitus ([facility] [address] Hypertension     Obese         Past Surgical History:     Past Surgical History:   Procedure Laterality Date    HERNIA REPAIR      TUBAL LIGATION          Medications Prior to Admission:     Prior to Admission medications    Medication Sig Start Date End Date Taking? Authorizing Provider   meloxicam (MOBIC) 15 MG tablet Take 1 tablet by mouth daily 7/13/22   Waldemar Hester,    docusate sodium (COLACE) 100 MG capsule Take 1 capsule by mouth 2 times daily 11/3/17   PATRICIA Garcias - CNP   simvastatin (ZOCOR) 40 MG tablet Take 40 mg by mouth nightly    Historical Provider, MD   meloxicam (MOBIC) 15 MG tablet Take 15 mg by mouth daily    Historical Provider, MD   acetaminophen (TYLENOL) 325 MG tablet Take 650 mg by mouth every 6 hours as needed for Pain    Historical Provider, MD   ondansetron (ZOFRAN) 4 MG tablet Take 1 tablet by mouth every 8 hours as needed for Nausea 5/22/17   Travon Isaac DO   ondansetron [facility]) 4 MG tablet Take 1 tablet by mouth every 8 hours as needed for Nausea 4/27/16   Srinivasa Harp MD PhD   lisinopril-hydrochlorothiazide (PRINZIDE;ZESTORETIC) 20-12.5 MG per tablet Take 1 tablet by mouth daily    Historical Provider, MD   benzonatate (TESSALON) 100 MG capsule Take 1 capsule by mouth 3 times daily as needed for Cough 5/4/15   Ludmila Webb DO   METFORMIN HCL PO Take 1,000 mg by mouth 2 times daily. Historical Provider, MD   LISINOPRIL PO Take 20 mg by mouth daily. Historical Provider, MD        Allergies:     Codeine and Percocet [oxycodone-acetaminophen]    Social History:     Tobacco:    reports that she has quit smoking. She has never used smokeless tobacco.  Alcohol:      reports no history of alcohol use. Drug Use:  reports no history of drug use. Patient quit tobacco 5 years ago. She admits that 1 point she did drink heavily approximately 10 years, quit 5 years ago, denies ongoing alcohol. Denies illegal drugs.   Uses a scooter for ambulation, denies recent falls or injuries    Family History:     Patient states she does believe her great-grandmother had a cancer but she does not know any details. She is unaware of any GI malignancy    Review of Systems:     Positive and Negative as described in HPI. Review of Systems  As above Limited review of systems secondary to poor historian, hard of hearing. Denies complications from diabetes, does not check sugars . patient does describe chronic dyspnea on exertion and was diagnosed with COPD. She denies any use of inhalers. She does describe dyspnea after walking approximately half a block which is chronic and denies any recent changes. Denies any cardiac history, cardiac work-up. Denies history of PFTs or any other objective testing to define dyspnea but does use a scooter for ambulation. Status post echocardiogram 5 to 6 years ago. Denies stress test or other ischemic work-up.   struggles with arthritis status post injections of trigger points. Denies prior colonoscopy. Denies prior abdominal symptoms. +kidney stones 2 years ago. Denies history of UTIs. Denies recent oral antibiotics. Denies visual changes. Was told at one point she probably does have sleep apnea but never followed up with formal testing. Denies any exposure to COVID-19. Denies history of DVT PE MI CHF CVA. Denies palpitations tachycardia or irregular heartbeat . she does occasionally describe lower extremity edema. She denies any worsening symptoms. Denies any current back pain , myalgias or arthralgias . review of systems otherwise -12 system review and otherwise unremarkable    Physical Exam:   /75   Pulse 98   Temp 99.2 °F (37.3 °C) (Oral)   Resp 21   Ht 4' 11\" (1.499 m)   Wt 223 lb (101.2 kg)   SpO2 98%   BMI 45.04 kg/m²   Temp (24hrs), Av.9 °F (38.3 °C), Min:99.2 °F (37.3 °C), Max:102.5 °F (39.2 °C)    No results for input(s): POCGLU in the last 72 hours.     Intake/Output Summary (Last 24 hours) at 2022 2255  Last data filed at 9/19/2022 2217  Gross per 24 hour   Intake 1050 ml   Output --   Net 1050 ml       Physical Exam  General sitting up in bed pleasant appropriate, ?easily confused, oriented to self location, slightly hard of hearing  Eye exam pupils equally round reactive sclera nonicteric normal horizontal gaze  ENT tympanic membranes with positive light reflex no erythema to external canal, oral pharynx with moist mucous membranes face symmetric neck supple  Cardiovascular regular rate and rhythm normal S1-S2 no murmurs or gallops  Lungs adequate air exchange no wheezing rhonchi tachypnea no accessory muscle use  GI soft obese with mild to moderate tenderness palpation that localizes to the epigastrium, right upper quadrant, no rebound or guarding,  habitus nondistended  Vascular minimal nonpitting pedal edema in feet with intact dorsalis pedis and posterior tibialis  Derm adequate foot hygiene, no rashes lesions or skin infections  Musculoskeletal passive range of motion of upper and lower limbs unremarkable no synovitis or joint effusions  Neuro nonfocal normal speech fluency, strength symmetric in upper and lower limbs without drift sensation grossly intact      Investigations:      Laboratory Testing:  Recent Results (from the past 24 hour(s))   Lipase    Collection Time: 09/19/22  6:40 PM   Result Value Ref Range    Lipase 34 13 - 60 U/L   CBC with Auto Differential    Collection Time: 09/19/22  6:40 PM   Result Value Ref Range    WBC 9.2 3.5 - 11.3 k/uL    RBC 3.80 (L) 3.95 - 5.11 m/uL    Hemoglobin 12.0 11.9 - 15.1 g/dL    Hematocrit 34.5 (L) 36.3 - 47.1 %    MCV 90.8 82.6 - 102.9 fL    MCH 31.6 25.2 - 33.5 pg    MCHC 34.8 28.4 - 34.8 g/dL    RDW 12.1 11.8 - 14.4 %    Platelets 075 077 - 228 k/uL    MPV 9.3 8.1 - 13.5 fL    NRBC Automated 0.0 0.0 per 100 WBC    Seg Neutrophils 71 (H) 36 - 65 %    Lymphocytes 20 (L) 24 - 43 %    Monocytes 8 3 - 12 %    Eosinophils % 1 1 - 4 %    Basophils 0 0 - 2 % Immature Granulocytes 0 0 %    Segs Absolute 6.51 1.50 - 8.10 k/uL    Absolute Lymph # 1.82 1.10 - 3.70 k/uL    Absolute Mono # 0.73 0.10 - 1.20 k/uL    Absolute Eos # 0.05 0.00 - 0.44 k/uL    Basophils Absolute 0.03 0.00 - 0.20 k/uL    Absolute Immature Granulocyte 0.03 0.00 - 0.30 k/uL   CMP    Collection Time: 09/19/22  6:40 PM   Result Value Ref Range    Glucose 133 (H) 70 - 99 mg/dL    BUN 19 6 - 20 mg/dL    Creatinine 1.36 (H) 0.50 - 0.90 mg/dL    Calcium 8.8 8.6 - 10.4 mg/dL    Sodium 134 (L) 135 - 144 mmol/L    Potassium 3.7 3.7 - 5.3 mmol/L    Chloride 97 (L) 98 - 107 mmol/L    CO2 22 20 - 31 mmol/L    Anion Gap 15 9 - 17 mmol/L    Alkaline Phosphatase 129 (H) 35 - 104 U/L    ALT 38 (H) 5 - 33 U/L    AST 34 (H) <32 U/L    Total Bilirubin 0.8 0.3 - 1.2 mg/dL    Total Protein 7.9 6.4 - 8.3 g/dL    Albumin 4.1 3.5 - 5.2 g/dL    Albumin/Globulin Ratio 1.1 1.0 - 2.5    GFR Non- 41 (L) >60 mL/min    GFR  49 (L) >60 mL/min    GFR Comment         Urinalysis    Collection Time: 09/19/22  6:54 PM   Result Value Ref Range    Color, UA Dark Yellow (A) Yellow    Turbidity UA Cloudy (A) Clear    Glucose, Ur NEGATIVE NEGATIVE    Bilirubin Urine NEGATIVE NEGATIVE    Ketones, Urine TRACE (A) NEGATIVE    Specific Gravity, UA 1.020 1.005 - 1.030    Urine Hgb TRACE (A) NEGATIVE    pH, UA 5.5 5.0 - 8.0    Protein, UA 1+ (A) NEGATIVE    Urobilinogen, Urine Normal Normal    Nitrite, Urine POSITIVE (A) NEGATIVE    Leukocyte Esterase, Urine MODERATE (A) NEGATIVE   Microscopic Urinalysis    Collection Time: 09/19/22  6:54 PM   Result Value Ref Range    WBC, UA TOO NUMEROUS TO COUNT 0 - 5 /HPF    RBC, UA 2 TO 5 0 - 4 /HPF    Casts UA  0 - 8 /LPF     5 TO 10 HYALINE Reference range defined for non-centrifuged specimen. Epithelial Cells UA 0 TO 2 0 - 5 /HPF    Bacteria, UA MANY (A) None       Imaging/Diagnostics:  CT ABDOMEN PELVIS WO CONTRAST Additional Contrast? None    Result Date: 9/19/2022  1. Hepatic steatosis. 2. Solid lesion in the pancreatic head is indeterminate. Pancreatic protocol MRI or CT is recommended for further evaluation. 3. Mildly enlarged periportal lymph node. Assessment :      Hospital Problems             Last Modified POA    * (Principal) Pyelonephritis 9/19/2022 Yes    Generalized abdominal pain 9/19/2022 Yes    Type 2 diabetes mellitus, without long-term current use of insulin ([address] 9/19/2022 Yes    Pancreatic mass 9/19/2022 Yes    Acute cystitis 9/19/2022 Yes    Hyponatremia 9/19/2022 Yes    High anion gap metabolic acidosis 0/81/0704 Yes    Renal insufficiency 9/19/2022 Yes       Plan:     Patient status observation in the Med/Surge    Acute quadrant/epigastric abdominal pain with nausea vomiting, anorexia- DDX possible GERD/gastritis/peptic ulcer versus biliary colic versus possibly related to acute pancreatic injury with CT imaging suggesting mass at the head of the pancreas. Continue IV PPI. Check MRI abdomen. consult GI to further evaluate. Suspected UTI with malodorous urine/fever with possible pyelonephritis. CT imaging without obstructive uropathy. Continue empiric antibiotics pending further evaluation  Type 2 diabetes continue blood sugar control  Chronic dyspnea on exertion-patient describes? ?  COPD. Suspect either likely related to deconditioning versus possibly cardiac etiology. Is chronic and stable by patient's description. Defer to outpatient to further pulmonary /cardiac work-up appropriate. New diagnosis of pancreatic mass/transaminitis. results of imaging discussed with patient with new diagnosis of mass, possible malignancy discussed. However, as patient does have fever will check MRI to evaluate other obstructive biliary process  Suspected sleep apnea recommend outpatient sleep study discussed with patient  Recent otitis externa. Completed eardrops. Clinically stable  Renal insufficiency, unknown baseline.   Possible CHRISTINE with electrolyte imbalance, hyponatremia, suspect component of dehydration. Pending recheck labs in a.m.. Status post IV fluid bolus  Morbid obesity      Counseled on results of CT imaging with concern for new diagnosis of pancreatic mass. Discussed with patient, questions and concerns addressed. CT imaging independently reviewed by myself. Discussed with the ED nursing. Dr. Sam Hoff notified of consult.     Anticipate likely discharge tomorrow if remains clinically stable, no further immediate plans by GI, tolerating diet    Consultations:   Mayuir Lanza HOSPITALIST        Arturo Tirado MD  9/19/2022  10:55 PM    Copy sent to Dr. Jos Calderón MD

## 2022-09-20 NOTE — CONSULTS
[facility]    GASTROENTEROLOGY CONSULT    Patient:   Diamante Vidal   :    1970   Facility:   [facility]   Date:    2022  Admission Dx:  Pyelonephritis [N12]  Abdominal pain [R10.9]  Requesting physician: Donna Sahni DO  Reason for consult: Evaluate head of pancreas mass with abdominal pain, nausea vomiting  CC : Right lower abdominal pain    SUBJECTIVE     HISTORY OF PRESENT ILLNESS  This is a 47 y.o.  female who was admitted 2022 with Pyelonephritis [N12]  Abdominal pain [R10.9]. We have been asked to see the patient in consultation by Donna Sahni DO for evaluate head of pancreas mass with abdominal pain, nausea and vomiting    23-year-old female with a history of morbid obesity, T2DM, hypertension, hyperthyroidism, hypercholesterolemia, and arthritis who presented to the ED with reports of right lower quadrant pain and admitted for pyelonephritis. Work-up in the ED included CT abdomen and pelvis showing pancreatic lesion prompting GI consult    Patient reports developing right-sided lower abdominal pain that began on Friday. Pain described as intermittent, pins and needle sensation, nonradiating, moderate in severity. She recalls trying to move her electric scooter on Thursday evening, no other believed aggravating factors. She had associated nausea and vomiting that only occurs at nighttime. She reports water made the pain better. She reports fever here but not at home. Records show patient had a temp of 102.5 on admission. Work-up in the ED included urinalysis which was positive for nitrates and leukoesterase, CT abdomen and pelvis showing hepatic steatosis, solid lesion in the pancreatic head and mildly enlarged periportal lymph nodes. MRI of the abdomen with and without IV contrast showed a 2.6 cm lesion arising from the pancreatic uncinate process with features highly suggestive of neuroendocrine neoplasm. No findings of metastatic disease. Severe hepatic steatosis. Patient denies any history of weight loss. No epigastric abdominal pain. No known pancreatic issues. Patient does report having 2 episodes of diarrhea yesterday that was new. Typical bowel habits consist of 1 formed stool daily. Patient reports past history of tobacco use and quit 6 years ago. Past history of alcohol use and quit 1 year ago. No marijuana use for 2 years. No family history of GI malignancy. Location/Symptom: Right lower quadrant  Timing/Onset: Friday  Provocation: Moving electric scooter  Quality: \"Pins and needle\"  Radiation: Nonradiating  Severity: Mild to moderate  Timing/Duration: Intermittent  Modifying Factors: Pain improved with drinking water        Summary of imaging completed at this time:  MRI/MRCP 9/20/2022  Impression   1. A 2.6 cm lesion arising from the pancreatic uncinate process has features   highly suggestive of a neuroendocrine neoplasm. Consider endoscopic biopsy   and serum biochemical evaluation. 2. No findings of metastatic disease in the abdomen. 3. Severe hepatic steatosis. Summary of labs completed at this time:  LFTs: T bili 0.8, AST 34, ALT 38, alkaline phosphatase 129, lipase 34. Previous GI history:   No prior EGD or colonoscopy. OBJECTIVE:     PAST MEDICAL/SURGICAL HISTORY  Past Medical History:   Diagnosis Date    Arthritis     Diabetes mellitus ([facility] [address]     Hypertension     Obese      Past Surgical History:   Procedure Laterality Date    HERNIA REPAIR      TUBAL LIGATION         ALLERGIES:  Allergies   Allergen Reactions    Codeine     Percocet [Oxycodone-Acetaminophen]        HOME MEDICATIONS:  Prior to Admission medications    Medication Sig Start Date End Date Taking?  Authorizing Provider   meloxicam (MOBIC) 15 MG tablet Take 1 tablet by mouth daily 7/13/22   Shazia Quintanilla DO   docusate sodium (COLACE) 100 MG capsule Take 1 capsule by mouth 2 times daily 11/3/17   Loreta Houston, APRN - CNP   simvastatin (ZOCOR) 40 MG tablet Take 40 mg by mouth nightly    Historical Provider, MD   meloxicam (MOBIC) 15 MG tablet Take 15 mg by mouth daily    Historical Provider, MD   acetaminophen (TYLENOL) 325 MG tablet Take 650 mg by mouth every 6 hours as needed for Pain    Historical Provider, MD   ondansetron (ZOFRAN) 4 MG tablet Take 1 tablet by mouth every 8 hours as needed for Nausea 5/22/17   Valeriy Brar,    [facility]ron [facility] 4 MG tablet Take 1 tablet by mouth every 8 hours as needed for Nausea 4/27/16   Jayson Cabrales MD PhD   lisinopril-hydrochlorothiazide (PRINZIDE;ZESTORETIC) 20-12.5 MG per tablet Take 1 tablet by mouth daily    Historical Provider, MD   benzonatate (TESSALON) 100 MG capsule Take 1 capsule by mouth 3 times daily as needed for Cough 5/4/15   Alferd Balloon, DO   METFORMIN HCL PO Take 1,000 mg by mouth 2 times daily. Historical Provider, MD   LISINOPRIL PO Take 20 mg by mouth daily.     Historical Provider, MD       CURRENT MEDICATIONS:  Scheduled Meds:   [MAR Hold] cefTRIAXone (ROCEPHIN) IV  1,000 mg IntraVENous Q24H    [MAR Hold] pantoprazole (PROTONIX) 40 mg injection  40 mg IntraVENous Daily    atorvastatin  20 mg Oral Daily    sodium chloride flush  5-40 mL IntraVENous 2 times per day    enoxaparin  30 mg SubCUTAneous BID    insulin lispro  0-4 Units SubCUTAneous TID WC    insulin lispro  0-4 Units SubCUTAneous Nightly     Continuous Infusions:   [MAR Hold] sodium chloride 30 mL/hr at 09/20/22 0958    dextrose      sodium chloride 75 mL/hr at 09/20/22 0004    sodium chloride       PRN Meds:[MAR Hold] sodium chloride flush, glucose, dextrose bolus **OR** dextrose bolus, glucagon (rDNA), dextrose, sodium chloride flush, sodium chloride, ondansetron **OR** ondansetron, acetaminophen **OR** acetaminophen    SOCIAL HISTORY:     Tobacco:   reports that she has quit smoking, 6 years ago she has never used smokeless tobacco.  Alcohol:   reports prior history of alcohol use. Quit 1 year ago  Illicit drugs:  reports no history of drug use. Quit marijuana 2 years ago    FAMILY HISTORY:     History reviewed. No pertinent family history. REVIEW OF SYSTEMS:    Constitutional: No fever, no chills, no lethargy, no weakness. No weight loss  HEENT:  No headache, otalgia, itchy eyes, nasal discharge or sore throat. Cardiac:  No chest pain, dyspnea, orthopnea or PND. Chest:   No cough, phlegm or wheezing. Abdomen:  + right mid to lower quadrant abdominal pain, + nausea / vomiting. Neuro:  No focal weakness, abnormal movements or seizure like activity. Skin:   No rashes, no itching. :   No hematuria, no pyuria, no dysuria, no flank pain. Extremities:  No swelling or joint pains. ROS was otherwise negative except as mentioned in the [address] PHYSICAL EXAM:    /71   Pulse 76   Temp 96.9 °F (36.1 °C) (Infrared)   Resp 14   Ht 4' 11\" (1.499 m)   Wt 223 lb (101.2 kg)   SpO2 97%   BMI 45.04 kg/m²     GENERAL:   Morbidly obese well developed, Well nourished, No apparent distress  HEAD:   Normocephalic, Atraumatic  EENT:   EOMI, Sclera not icteric, Oropharynx moist   NECK:   Supple, Trachea midline  LUNGS:  CTA Bilaterally  HEART:  RRR, No murmur  ABDOMEN:   Soft, obese, right mid to lower quadrant tenderness, Nondistended, BS WNL  EXT:   No clubbing. No cyanosis. No edema. SKIN:   No rashes. No jaundice. No stigmata of liver disease.     MUSC/SKEL:   Adequate muscle bulk for patient's age, No significant synovitis, No deformities  NEURO:  A&O x Three, CN II- XII grossly intact      LABS AND IMAGING:     CBC  Recent Labs     09/19/22  1840   WBC 9.2   HGB 12.0   HCT 34.5*   MCV 90.8   MCH 31.6   MCHC 34.8          IMMATURE PLTs  No results found for: PLTFLUORE    BMP  Recent Labs     09/19/22  1840 09/20/22  0431   * 136   K 3.7 3.6*   CL 97* 105   CO2 22 20   BUN 19 23*   CREATININE 1.36* 1.33*   GLUCOSE 133* 198*   CALCIUM 8.8 7.9* LFTS  Recent Labs     09/19/22  1840 09/20/22  0431   ALKPHOS 129* 106*   BILITOT 0.8 0.5   AST 34* 26   ALT 38* 30   PROT 7.9 6.7   LABALBU 4.1 3.3*       AMYLASE/LIPASE/AMMONIA  Recent Labs     09/19/22  1840   LIPASE 34       PT/INR  Recent Labs     09/20/22  0431   PROTIME 10.4   INR 1.0       ANEMIA STUDIES  No results for input(s): IRON, LABIRON, TIBC, UIBC, FERRITIN, URPDAVEG69, FOLATE, OCCULTBLD in the last 72 hours. LIVER WORK UP:    Acute Hepatitis Panel   No results found for: HEPBSAG, HEPCAB, HEPBIGM, HEPAIGM    HCV Genotype   No results found for: HEPATITISCGENOTYPE    HCV Quantitative   No results found for: HCVQNT    AFP  No results found for: AFP    Alpha 1 antitrypsin   No results found for: A1A    Anti - Liver/Kidney Ab  No results found for: LIVER-KIDNEYMICROSOMALAB    NATTY  No results found for: NATTY    AMA  No results found for: MITOAB    ASMA  No results found for: SMOOTHMUSCAB    Ceruloplasmin  No results found for: CERULOPLSM    Celiac panel  No results found for: TISSTRNTIIGG, TTGIGA, IGA    IgG  No results found for: IGG    IgM  No results found for: IGM    GGT   No results found for: LABGGT    PT/INR  Recent Labs     09/20/22  0431   PROTIME 10.4   INR 1.0       Cancer Markers:  CEA:  No results found for: CEA  Ca 125:  No results found for:   Ca 19-9:   No results found for:   AFP: No results found for: AFP    Lactic acid:No results for input(s): LACTACIDWB in the last 72 hours. IMAGING  CT ABDOMEN PELVIS WO CONTRAST Additional Contrast? None  Result Date: 9/19/2022  1. Hepatic steatosis. 2. Solid lesion in the pancreatic head is indeterminate. Pancreatic protocol MRI or CT is recommended for further evaluation. 3. Mildly enlarged periportal lymph node.        MRI ABDOMEN W WO CONTRAST MRCP    Result Date: 9/20/2022  EXAMINATION: MRI OF THE ABDOMEN WITH AND WITHOUT CONTRAST AND MRCP 9/20/2022 8:10 am TECHNIQUE: Multiplanar multisequence MRI of the abdomen was performed with and without the administration of intravenous contrast. COMPARISON: Abdomen and pelvis CTs dating to 04/04/2013 HISTORY: ORDERING SYSTEM PROVIDED HISTORY: eval panc mass, abdominal pain, fever TECHNOLOGIST PROVIDED HISTORY: eval panc mass, abdominal pain, fever What is the sedation requirement?->None Reason for Exam: eval panc mass, abdominal pain, fever FINDINGS: Patient motion in some areas, partially limiting evaluation of those areas. LOWER CHEST:  [place] dependent atelectasis in the bilateral lower lobes on some sequences. Normal heart size. No pleural nor pericardial effusions. LIVER:  Mildly enlarged with the right hemiliver possibly accentuated by a normal variant Riedel's lobe. Severe steatosis with no findings of cirrhosis. GALLBLADDER/BILE DUCTS:  Normal gallbladder. No intrahepatic nor extrahepatic biliary dilation. No obvious ductal filling defects nor strictures. PANCREAS:  Typical duct configuration without dilation. No obvious ductal filling defects nor strictures. Mildly atrophic parenchyma. 2.6 cm x 2.2 cm x 2.3 cm T2 hyperintense, T1 intermediate lesion in the uncinate process with diffuse hyperenhancement on all postcontrast sequences and no significant diffusion restriction. SPLEEN:  Normal. ADRENAL GLANDS:  Normal. KIDNEYS:  Bilateral renal peripelvic cysts, more prominent on the left. GI/BOWEL:  Normal course and caliber of the stomach, small bowel, and colon without obstruction. Normal appendix with retrocecal location. PELVIS:  Not included. PERITONEUM/RETROPERITONEUM:  Long-term stability of mildly enlarged portohepatic and portocaval lymph nodes, likely reactive. Long-term stability of nonenlarged to mildly enlarged mesenteric nodes with subtle mesenteric stranding, also likely reactive though the appearance could be seen with mesenteric panniculitis or other etiologies. No free intraperitoneal fluid. VESSELS:  Typical arterial anatomy. Patent veins.  SOFT TISSUES/BONES:  Laxity of the anterior and lateral abdominal wall musculature with diastasis recti. No suspicious marrow lesions. Multilevel degenerative disc disease. 1. A 2.6 cm lesion arising from the pancreatic uncinate process has features highly suggestive of a neuroendocrine neoplasm. Consider endoscopic biopsy and serum biochemical evaluation. 2. No findings of metastatic disease in the abdomen. 3. Severe hepatic steatosis. IMPRESSION:     24-year-old female with a history of morbid obesity, T2DM, hypertension, hyperthyroidism, hypercholesterolemia, and arthritis who presented to the ED with reports of right lower quadrant pain and admitted for pyelonephritis. Work-up in the ED included CT abdomen and pelvis showing pancreatic lesion prompting GI consult    1. Pancreatic head lesion -MRI showing 2.6 cm lesion arising from pancreatic uncinate process suggestive of neuroendocrine neoplasm. 2.  Right-sided abdominal pain -pancreatic lesion vs UTI    3. Hepatic steatosis likely secondary to morbid obesity     4. UTI    5. Morbid obesity (BMI 45.0)     Old records, labs and imaging reviewed. PLAN   1. We will plan for EUS with FNA vs FNB today. Discussed with patient who is agreeable to proceed. Discussed with primary team Dr. Allison Ryan  2. Keep n.p.o. for now  3. Patient would benefit from weight loss. Consider outpatient referral to bariatrics. 4.  Further plans to follow EUS      Initial care time/code: Spent 80 out of 80 minutes on this date of service including chart prep, patient interaction, discussion with primary team, documentation and coordination of care for the above conditions    This plan was formulated in collaboration with Dr. Dawna Mccabe  Thank you for allowing us to participate in the care of your patient.     Electronically signed by: PATRICIA Wallis CNP on 9/20/2022 at 10:20 AM     Please note that this note was generated using a voice recognition dictation software. Although every effort was made to ensure the accuracy of this automated transcription, some errors in transcription may have occurred.

## 2022-09-20 NOTE — CARE COORDINATION
09/20/22 1539   Service Assessment   Patient Orientation Alert and Oriented   History Provided By Patient   PCP Verified by CM Yes  (Sahil Walton MD)   Last Visit to PCP Within last 3 months   Current Functional Level Independent in ADLs/IADLs   Social/Functional History   Lives With [facility] Equipment   (scooter, glucometer)   Discharge Planning   Potential Assistance Purchasing Medications No   Patient expects to be discharged to: [facility]   Confirm Follow Up Transport Family     Plan: return home with family

## 2022-09-20 NOTE — OP NOTE
EGD/EUS      Patient:   Merry Hampton    :    1970    Facility:   [address] [facility]   Referring/PCP: Jai Luna MD    Procedure:   Esophagogastroduodenoscopy with biopsy, polypectomy and control of bleeding and Endoscopic ultrasound   Date:     2022   Endoscopist:  Hussein Martini MD, [facility]    Indication:   Abnormal imaging of the abdomen showing pancreatic uncinate process mass and lymphadenopathy    Procedure diagnosis: Uncinate process mass suspicious for neuroendocrine tumor and peripancreatic lymph node, gastric polyp with bleeding -resected and clipped, gastritis biopsied    Anesthesia:  MAC    Complications: None    Specimen: Sent to the lab    Description of Procedure:  Informed consent was obtained from the patient after explanation of the procedure including indications, description of the procedure,  benefits and possible risks and complications of the procedure, and alternatives. Questions were answered. The patient's history was reviewed and a directed physical examination was performed prior to the procedure. Patient was monitored throughout the procedure with pulse oximetry and periodic assessment of vital signs. Patient was sedated as noted above. With the patient in the left lateral decubitus position, the Olympus videoendoscope followed by endoechoendoscope was placed in the patient's mouth and under direct visualization passed into the esophagus. The scope was passed to the 2nd portion of the duodenum. The patient tolerated the procedure well and was taken to the recovery area in good condition. Estimated blood loss was none. EGD Findings[de-identified]   Esophagus: normal.   Stomach: Stomach had mild to moderate gastric erythema in the antrum and possible GA VE. This was biopsied with cold biopsy forceps. There was a 8 mm sessile polyp just prior to the pylorus which had mild oozing of blood.   This was snared with cold snare and clipped with 1 metal resolution clip with complete control of bleeding. The rest of the stomach was otherwise normal.  Duodenum: normal    EUS findings:  Pancreas: A round mass was identified in the pancreatic head-near the uncinate process. The mass was hypoechoic, homogeneous and solid. The mass measured 25 mm by 20 mm in maximal cross-sectional diameter. The endosonographic borders were well-defined. Fine needle biopsy was performed. Color Doppler imaging was utilized prior to needle puncture to confirm a lack of significant vascular structures within the needle path. 2 passes were made with the 22 gauge ultrasound biopsy needle using a transduodenal approach. A stylet was used. Estimated blood loss was minimal. Verification of patient identification for the specimen was done by the physician and nurse using the patient's name and medical record number. The rest of the pancreas was otherwise normal. Normal PD. No signs of chronic pancreatitis. CBD: Normal, no stones, sludge. CBD diameter:  3 mm. Ampulla: Normal.  Left lobe of liver: normal.   Lymphadenopathy: 1 hypoechoic lymph node was visualized in the peripancreatic area which measured 7 mm x 6 mm in diameter, round in shape with well-defined borders. Fine-needle biopsy of this lymph node was performed with 22-gauge shark core needle via transduodenal route. 2 passes were made. Recommendations: Await cytology and pathology results  Obtained pancreatobiliary's surgery consult for possible Whipple. Regular diet today.     Electronically signed by Luma Stewart MD, FACG  on 9/20/2022 at 12:02 PM

## 2022-09-20 NOTE — PROGRESS NOTES
[facility]  Office: [address], DO, Rory Chun, DO, Rebekah Pete, DO, Irlanda Mendoza Blood, DO, Shilpi Azul MD, Theresa Nair MD, Kory Early MD, Claudean Millard, MD,  Gloria Yepez MD, Vanita Bailey MD, Anna Knight, DO, Katelyn Dugan MD,  Alice Beckford MD, aTy Mayberry MD, Verla Kawasaki, DO, Heber Ulrich MD, Alysha Mejía MD, Bailey Emerson MD, Kristopher Terrell MD, Peter Montana MD, Antonia Rios MD, Yoav Leigh, DO, Pelon Samuels MD, Homero Evans MD, Myah Crowell, CNP,  Servando Heller, CNP, Tommie Hill, CNP, Monika Duarte, CNP,  Eyad Jean, [facility], Nya Amin, CNP, Gabino Soto, CNP, Berenice Rogers, CNP, Tim Saint, CNP, Dk Hyman, CNP, MARIO McintyreC, Alexis Spicer, CNS, Raymon Villarreal, [facility], Harmony Reed, CNP, Apolinar Cabezas, [facility], Karli Marmolejo, [facility]    Progress Note    9/20/2022    12:10 PM    Name:   Jasmina Abarca  MRN:     [id number]     Acct:      [de-identified]   Room:   [facility]/NONE  IP Day:  1  Admit Date:  9/19/2022  5:23 PM    PCP:   Jonn Pitts MD  Code Status:  Full Code    Subjective:     C/C:   Chief Complaint   Patient presents with    Flank Pain     Interval History Status: improved. Vitals reviewed, Tmax 102.5 on arrival. Otherwise hemodynamically stable. Saturating well on room air. Labs reviewed, hypokalemia replaced. Creatinine stable. Concern for UTI. UA with positive nitrites and leukocyte esterase with numerous WBCs. On Rocephin. Overnight patient had no significant events. On examination patient resting comfortably in bed. Complains of RLQ, RUQ and suprapubic abdominal pain. GI at bedside at time of initial evaluation. Plan for EUS with biopsy.      Brief History:     Mrs. Jovon Yusuf is a 47year old female with a significant past medical history of HTN and Type II DM who initially presented to the ED with the chief complaint of RUQ abdominal pain with radiation to right flank onset Friday 9/16/2022. Admitted to associated Fevers, Chills, Nausea and Vomiting 2-3x Friday. Abdominal pain worsened over the weekend. In the ED she was initially febrile 102.5 but otherwise hemodynamically stable. Labs reviewed hypokalemia. Creatinine elevated but unknown baseline but patient did appear dry on admission. CT Abdomen and pelvis was done due to suspected Pyelonephritis. Demonstrated solid lesion in the pancreatic head. MRI w and wo contrast MRCP was done demonstrating a 2.6 cm lesion arising fromt he pancreatic uncinate process with features highly suggestive of neuroendocrine neoplasm. GI was consulted with plan for EUS. Review of Systems:     Constitutional:  positive for chills, fevers. negative for sweats  Respiratory:  negative for cough, dyspnea on exertion, shortness of breath, wheezing  Cardiovascular:  negative for chest pain, chest pressure/discomfort, lower extremity edema, palpitations  Gastrointestinal:  positive for abdominal pain, nausea, vomiting negative for  constipation, diarrhea. Neurological:  negative for dizziness, headache    Medications: Allergies:     Allergies   Allergen Reactions    Codeine     Percocet [Oxycodone-Acetaminophen]        Current Meds:   Scheduled Meds:    [MAR Hold] cefTRIAXone (ROCEPHIN) IV  1,000 mg IntraVENous Q24H    [MAR Hold] pantoprazole (PROTONIX) 40 mg injection  40 mg IntraVENous Daily    atorvastatin  20 mg Oral Daily    sodium chloride flush  5-40 mL IntraVENous 2 times per day    enoxaparin  30 mg SubCUTAneous BID    insulin lispro  0-4 Units SubCUTAneous TID WC    insulin lispro  0-4 Units SubCUTAneous Nightly     Continuous Infusions:    [MAR Hold] sodium chloride 30 mL/hr at 09/20/22 0958    dextrose      sodium chloride 75 mL/hr at 09/20/22 0004    sodium chloride       PRN Meds: [MAR Hold] sodium chloride flush, glucose, dextrose bolus **OR** dextrose bolus, glucagon (rDNA), dextrose, sodium chloride flush, sodium chloride, ondansetron **OR** ondansetron, acetaminophen **OR** acetaminophen    Data:     Past Medical History:   has a past medical history of Arthritis, Diabetes mellitus ([address] Hypertension, and Obese. Social History:   reports that she has quit smoking. She has never used smokeless tobacco. She reports that she does not drink alcohol and does not use drugs. Family History: History reviewed. No pertinent family history. Vitals:  /71   Pulse 76   Temp 96.9 °F (36.1 °C) (Infrared)   Resp 14   Ht 4' 11\" (1.499 m)   Wt 223 lb (101.2 kg)   SpO2 97%   BMI 45.04 kg/m²   Temp (24hrs), Av.8 °F (37.1 °C), Min:96.9 °F (36.1 °C), Max:102.5 °F (39.2 °C)    Recent Labs     22   POCGLU 141*       I/O (24Hr):     Intake/Output Summary (Last 24 hours) at 2022 1210  Last data filed at 2022 1203  Gross per 24 hour   Intake 2062.23 ml   Output --   Net 2062.23 ml       Labs:  Hematology:  Recent Labs     22  043   WBC 9.2  --    RBC 3.80*  --    HGB 12.0  --    HCT 34.5*  --    MCV 90.8  --    MCH 31.6  --    MCHC 34.8  --    RDW 12.1  --      --    MPV 9.3  --    INR  --  1.0     Chemistry:  Recent Labs     22  043   * 136   K 3.7 3.6*   CL 97* 105   CO2 22 20   GLUCOSE 133* 198*   BUN 19 23*   CREATININE 1.36* 1.33*   MG  --  2.0   ANIONGAP 15 11   LABGLOM 41* 42*   GFRAA 49* 51*   CALCIUM 8.8 7.9*     Recent Labs     22  0431   PROT 7.9  --  6.7   LABALBU 4.1  --  3.3*   AST 34*  --  26   ALT 38*  --  30   ALKPHOS 129*  --  106*   BILITOT 0.8  --  0.5   LIPASE 34  --   --    POCGLU  --  141*  --      ABG:No results found for: POCPH, PHART, PH, POCPCO2, PZJ1GYJ, PCO2, POCPO2, PO2ART, PO2, POCHCO3, AAK4YUX, HCO3, NBEA, PBEA, BEART, BE, THGBART, THB, NHC9VLE, BDIH3ZVO, Z1RJFHMI, O2SAT, FIO2  Lab Results   Component Value Date/Time    SPECIAL NOT REPORTED 05/22/2017 12:19 PM     Lab Results   Component Value Date/Time    CULTURE NO SIGNIFICANT GROWTH 05/22/2017 12:19 PM    CULTURE  05/22/2017 12:19 PM     [employer] [address] [facility], [address] [place] [address] [phone]     Radiology:  CT ABDOMEN PELVIS WO CONTRAST Additional Contrast? None    Result Date: 9/19/2022  1. Hepatic steatosis. 2. Solid lesion in the pancreatic head is indeterminate. Pancreatic protocol MRI or CT is recommended for further evaluation. 3. Mildly enlarged periportal lymph node. Physical Examination:        General appearance:  alert, cooperative and no distress  Mental Status:  oriented to person, place and time and normal affect  Lungs:  clear to auscultation bilaterally, normal effort  Heart:  regular rate and rhythm, no murmur  Abdomen:  soft with minimal tenderness in the RUQ, RLQ and Suprapubic area, nondistended, normal bowel sounds, no masses, hepatomegaly, splenomegaly  Extremities:  no edema, redness, tenderness in the calves  Skin:  no gross lesions, rashes, induration    Assessment:        Hospital Problems             Last Modified POA    * (Principal) Acute pyelonephritis 9/20/2022 Yes    Diabetes mellitus type 2 in obese ([address] 9/20/2022 Yes    Pancreatic mass 9/19/2022 Yes    Generalized abdominal pain 9/19/2022 Yes    Acute cystitis 9/19/2022 Yes    Hyponatremia 9/19/2022 Yes    High anion gap metabolic acidosis 1/01/0416 Yes    Renal insufficiency 9/19/2022 Yes    Abdominal pain 9/20/2022 Yes     Plan:        Acute Cystitis. - UA concerning for UTI.   - Rocephin 1 g q 24 hours. - Urine culture pending. Pancreatic Mass.   - CT Abdomen and pelvis was done due to suspected Pyelonephritis and demonstrated solid lesion in the pancreatic head.    - MRI w and wo contrast MRCP was done demonstrating a 2.6 cm lesion arising from the pancreatic uncinate process with features highly suggestive of neuroendocrine neoplasm.   - GI was consulted with plan for EUS. Acute Kidney Injury. - Gentle Hydration.   - Creatinine stable. - Daily BMP. Hypertension.   - Lisinopril HCTz held due to CHRISTINE. - Stable BP off meds. Type II DM. - Low Dose Corrective Algorithm. - Last Hemoglobin A1c 7.3 on 4/7/17.   - POCT Glucose. DVT Prophylaxis: Lovenox. GI Prophylaxis: Protonix 40 mg daily.      King Osorio DO  9/20/2022  12:10 PM

## 2022-09-20 NOTE — ANESTHESIA POSTPROCEDURE EVALUATION
Department of Anesthesiology  Postprocedure Note    Patient: Al Bishop  MRN: [id number]  [place]: 1970  Date of evaluation: 9/20/2022      Procedure Summary     Date: 09/20/22 Room / Location: [address] [place] [address]    Anesthesia Start: 1115 Anesthesia Stop: 1209    Procedures:       EGD W/EUS FNA      EGD BIOPSY Diagnosis: Pancreatic mass    Surgeons: Shakila Veloz MD Responsible Provider: Phil Pack MD    Anesthesia Type: MAC ASA Status: 3          Anesthesia Type: No value filed.     Surjit Phase I: Surjit Score: 10    Surjit Phase II:        Anesthesia Post Evaluation    Patient location during evaluation: PACU  Patient participation: complete - patient participated  Level of consciousness: awake and alert  Pain score: 0  Airway patency: patent  Nausea & Vomiting: no vomiting and no nausea  Complications: no  Cardiovascular status: hemodynamically stable  Respiratory status: acceptable  Hydration status: stable

## 2022-09-20 NOTE — ED NOTES
The following labs were labeled with appropriate pt sticker and tubed to lab:     [] Blue     [] Lavender   [] on ice  [] Green/yellow  [] Green/black [] on ice  [] Briana Milder  [] on ice  [] Yellow  [] Red  [] Pink  [] ABG  [] VBG    [x] COVID-19 swab    [x] Rapid  [] PCR  [] Flu swab  [] Peds Viral Panel     [] Urine Sample  [] Pelvic Cultures  [] Blood Cultures  [] X 2  [] STREP Cultures         Consuelo English RN  09/20/22 2367

## 2022-09-21 DIAGNOSIS — D3A.8 PRIMARY PANCREATIC NEUROENDOCRINE TUMOR: Primary | ICD-10-CM

## 2022-09-21 PROBLEM — N39.0 E. COLI UTI: Status: ACTIVE | Noted: 2022-09-19

## 2022-09-21 PROBLEM — B96.20 E. COLI UTI: Status: ACTIVE | Noted: 2022-09-19

## 2022-09-21 LAB
CULTURE: ABNORMAL
EKG ATRIAL RATE: 78 BPM
EKG P AXIS: 31 DEGREES
EKG P-R INTERVAL: 172 MS
EKG Q-T INTERVAL: 386 MS
EKG QRS DURATION: 84 MS
EKG QTC CALCULATION (BAZETT): 440 MS
EKG VENTRICULAR RATE: 78 BPM
GLUCOSE BLD-MCNC: 130 MG/DL (ref 65–105)
GLUCOSE BLD-MCNC: 170 MG/DL (ref 65–105)
SPECIMEN DESCRIPTION: ABNORMAL

## 2022-09-21 PROCEDURE — 36415 COLL VENOUS BLD VENIPUNCTURE: CPT

## 2022-09-21 PROCEDURE — 99225 PR SBSQ OBSERVATION CARE/DAY 25 MINUTES: CPT | Performed by: INTERNAL MEDICINE

## 2022-09-21 PROCEDURE — 82947 ASSAY GLUCOSE BLOOD QUANT: CPT

## 2022-09-21 PROCEDURE — 84134 ASSAY OF PREALBUMIN: CPT

## 2022-09-21 PROCEDURE — 6360000002 HC RX W HCPCS: Performed by: INTERNAL MEDICINE

## 2022-09-21 PROCEDURE — 6370000000 HC RX 637 (ALT 250 FOR IP): Performed by: INTERNAL MEDICINE

## 2022-09-21 PROCEDURE — 2500000003 HC RX 250 WO HCPCS: Performed by: INTERNAL MEDICINE

## 2022-09-21 PROCEDURE — 96361 HYDRATE IV INFUSION ADD-ON: CPT

## 2022-09-21 PROCEDURE — 93010 ELECTROCARDIOGRAM REPORT: CPT | Performed by: INTERNAL MEDICINE

## 2022-09-21 PROCEDURE — 99223 1ST HOSP IP/OBS HIGH 75: CPT | Performed by: SURGERY

## 2022-09-21 PROCEDURE — 96372 THER/PROPH/DIAG INJ SC/IM: CPT

## 2022-09-21 PROCEDURE — 6370000000 HC RX 637 (ALT 250 FOR IP): Performed by: NURSE PRACTITIONER

## 2022-09-21 PROCEDURE — APPSS30 APP SPLIT SHARED TIME 16-30 MINUTES: Performed by: INTERNAL MEDICINE

## 2022-09-21 PROCEDURE — G0378 HOSPITAL OBSERVATION PER HR: HCPCS

## 2022-09-21 PROCEDURE — 6360000002 HC RX W HCPCS: Performed by: NURSE PRACTITIONER

## 2022-09-21 PROCEDURE — 82941 ASSAY OF GASTRIN: CPT

## 2022-09-21 PROCEDURE — 94761 N-INVAS EAR/PLS OXIMETRY MLT: CPT

## 2022-09-21 RX ORDER — PANTOPRAZOLE SODIUM 40 MG/1
40 TABLET, DELAYED RELEASE ORAL
Status: DISCONTINUED | OUTPATIENT
Start: 2022-09-21 | End: 2022-09-22 | Stop reason: HOSPADM

## 2022-09-21 RX ADMIN — PANTOPRAZOLE SODIUM 40 MG: 40 TABLET, DELAYED RELEASE ORAL at 11:03

## 2022-09-21 RX ADMIN — ATORVASTATIN CALCIUM 20 MG: 20 TABLET, FILM COATED ORAL at 21:26

## 2022-09-21 RX ADMIN — CEFTRIAXONE SODIUM 1000 MG: 10 INJECTION, POWDER, FOR SOLUTION INTRAVENOUS at 08:48

## 2022-09-21 RX ADMIN — ENOXAPARIN SODIUM 30 MG: 100 INJECTION SUBCUTANEOUS at 21:26

## 2022-09-21 ASSESSMENT — PAIN SCALES - GENERAL: PAINLEVEL_OUTOF10: 0

## 2022-09-21 NOTE — PROGRESS NOTES
Surgical Oncology:  Daily Progress Note                    PATIENT NAME: Ancelmo Simental     TODAY'S DATE: 9/21/2022, 8:25 AM  CC:  right sided abdominal pain    SUBJECTIVE:     Pt seen and examined at bedside this morning, no acute events overnight. Patient denies nausea or emesis. She continues to have some right-sided abdominal pain. Passing flatus. OBJECTIVE:   VITALS:  /77   Pulse 74   Temp 97.9 °F (36.6 °C) (Oral)   Resp 18   Ht 4' 11\" (1.499 m)   Wt 223 lb (101.2 kg)   SpO2 96%   BMI 45.04 kg/m²      INTAKE/OUTPUT:      Intake/Output Summary (Last 24 hours) at 9/21/2022 0825  Last data filed at 9/20/2022 1203  Gross per 24 hour   Intake 500 ml   Output --   Net 500 ml       PHYSICAL EXAM:  CONSTITUTIONAL:  awake, alert, not distressed and morbidly obese  HEENT: Normocephalic/atraumatic, without obvious abnormality. NECK:  Supple, symmetrical, trachea midline   CARDIOVASCULAR: Regular rate and rhythm without murmurs. LUNGS: Good chest rise bilaterally, no accessory muscle use  ABDOMEN: Soft, mildly tender right hemiabdomen, not distended  MUSCULOSKELETAL: Muscle strength intact in all extremities bilaterally. NEUROLOGIC: CN II- XII intact. Gross motor intact without focal weakness.   SKIN: No cyanosis, rashes  Orientation:   oriented to person, place, and time    Data:  CBC with Differential:    Lab Results   Component Value Date/Time    WBC 9.2 09/19/2022 06:40 PM    RBC 3.80 09/19/2022 06:40 PM    RBC 3.73 05/07/2012 10:45 AM    HGB 12.0 09/19/2022 06:40 PM    HCT 34.5 09/19/2022 06:40 PM     09/19/2022 06:40 PM     05/07/2012 10:45 AM    MCV 90.8 09/19/2022 06:40 PM    MCH 31.6 09/19/2022 06:40 PM    MCHC 34.8 09/19/2022 06:40 PM    RDW 12.1 09/19/2022 06:40 PM    LYMPHOPCT 20 09/19/2022 06:40 PM    MONOPCT 8 09/19/2022 06:40 PM    BASOPCT 0 09/19/2022 06:40 PM    MONOSABS 0.73 09/19/2022 06:40 PM    LYMPHSABS 1.82 09/19/2022 06:40 PM    EOSABS 0.05 09/19/2022 06:40 PM BASOSABS 0.03 09/19/2022 06:40 PM    DIFFTYPE NOT REPORTED 04/07/2017 11:10 AM     BMP:    Lab Results   Component Value Date/Time     09/20/2022 04:31 AM    K 3.6 09/20/2022 04:31 AM     09/20/2022 04:31 AM    CO2 20 09/20/2022 04:31 AM    BUN 23 09/20/2022 04:31 AM    LABALBU 3.3 09/20/2022 04:31 AM    LABALBU 4.4 05/07/2012 10:45 AM    CREATININE 1.33 09/20/2022 04:31 AM    CALCIUM 7.9 09/20/2022 04:31 AM    GFRAA 51 09/20/2022 04:31 AM    LABGLOM 42 09/20/2022 04:31 AM    GLUCOSE 198 09/20/2022 04:31 AM    GLUCOSE 104 05/07/2012 10:45 AM       Radiology Review:   CT ABDOMEN PELVIS WO CONTRAST Additional Contrast? None    Result Date: 9/19/2022  1. Hepatic steatosis. 2. Solid lesion in the pancreatic head is indeterminate. Pancreatic protocol MRI or CT is recommended for further evaluation. 3. Mildly enlarged periportal lymph node. MRI ABDOMEN W WO CONTRAST MRCP    Result Date: 9/20/2022  1. A 2.6 cm lesion arising from the pancreatic uncinate process has features highly suggestive of a neuroendocrine neoplasm. Consider endoscopic biopsy and serum biochemical evaluation. 2. No findings of metastatic disease in the abdomen. 3. Severe hepatic steatosis.         ASSESSMENT:  Active Hospital Problems    Diagnosis Date Noted    Abdominal pain [R10.9] 09/20/2022     Priority: Medium    Acute pyelonephritis [N10] 09/19/2022     Priority: Medium    Diabetes mellitus type 2 in obese ([facility] [address] [E11.69, E66.9] 09/19/2022     Priority: Medium    Pancreatic mass [K86.89] 09/19/2022     Priority: Medium    Generalized abdominal pain [R10.84] 09/19/2022     Priority: Medium    E. coli UTI [N39.0, B96.20] 09/19/2022     Priority: Medium    Hyponatremia [E87.1] 09/19/2022     Priority: Medium    High anion gap metabolic acidosis [L17.0] 09/19/2022     Priority: Medium    Renal insufficiency [N28.9] 09/19/2022     Priority: Medium       47 y.o. female with CHRISTINE, UTI, incidentally found mass in the uncinate process of the pancreas    Plan:  Continue medical mgmt and supportive care per primary  Follow up fasting gastrin level this AM  Antibiotics for UTI  Follow-up pathology result from GI [facility]  Will need outpatient follow up with Dr. Jose A Wallace    Electronically signed by Arcadio Yang DO  on 9/21/2022 at 8:25 AM

## 2022-09-21 NOTE — PROGRESS NOTES
[place] Gastroenterology Progress Note    Hemal Saldivar is a 47 y.o. female patient. Hospitalization Day:1      Chief consult reason: Pancreatic mass      Subjective: Patient seen and examined. No acute events overnight. Patient reports some mild epigastric abdominal discomfort. No other reports of voiced. Would like to eat diet. Objective:   VITALS:  /77   Pulse 74   Temp 97.9 °F (36.6 °C) (Oral)   Resp 18   Ht 4' 11\" (1.499 m)   Wt 223 lb (101.2 kg)   SpO2 96%   BMI 45.04 kg/m²   TEMPERATURE:  Current - Temp: 97.9 °F (36.6 °C);  Max - Temp  Av.7 °F (36.5 °C)  Min: 96.9 °F (36.1 °C)  Max: 99 °F (37.2 °C)    Physical Assessment:  General appearance:  alert, cooperative and no distress  Mental Status:  oriented to person, place and time and normal affect  Lungs:  clear to auscultation bilaterally, normal effort  Heart:  regular rate and rhythm, no murmur  Abdomen:  soft, mild epigastric tenderness, nondistended, normal bowel sounds, no masses  Extremities:  no edema, no redness, No clubbing  Skin:  warm, dry, no gross lesions or rashes    CURRENT MEDICATIONS:  Scheduled Meds:   [MAR Hold] cefTRIAXone (ROCEPHIN) IV  1,000 mg IntraVENous Q24H    [MAR Hold] pantoprazole (PROTONIX) 40 mg injection  40 mg IntraVENous Daily    atorvastatin  20 mg Oral Daily    sodium chloride flush  5-40 mL IntraVENous 2 times per day    enoxaparin  30 mg SubCUTAneous BID    insulin lispro  0-4 Units SubCUTAneous TID WC    insulin lispro  0-4 Units SubCUTAneous Nightly     Continuous Infusions:   [MAR Hold] sodium chloride Stopped (22 1249)    lactated ringers 75 mL/hr at 22 2017    dextrose      sodium chloride 75 mL/hr at 22 0004    sodium chloride       PRN Meds:[MAR Hold] sodium chloride flush, glucose, dextrose bolus **OR** dextrose bolus, glucagon (rDNA), dextrose, sodium chloride flush, sodium chloride, ondansetron **OR** ondansetron, acetaminophen **OR** acetaminophen      Data Review:  LABS and IMAGING:     CBC  Recent Labs     09/19/22 1840   WBC 9.2   HGB 12.0   HCT 34.5*   MCV 90.8   MCHC 34.8   RDW 12.1          Immature PLTs   No results found for: PLTFLUORE    ANEMIA STUDIES  No results for input(s): LABIRON, TIBC, IRON, FERRITIN, [id number], FOLATE, OCCULTBLD in the last 72 hours. BMP  Recent Labs     09/19/22 1840 09/20/22 0431   * 136   K 3.7 3.6*   CL 97* 105   CO2 22 20   BUN 19 23*   CREATININE 1.36* 1.33*   GLUCOSE 133* 198*   CALCIUM 8.8 7.9*       LFTS  Recent Labs     09/19/22 1840 09/20/22 0431   ALKPHOS 129* 106*   ALT 38* 30   AST 34* 26   BILITOT 0.8 0.5   LABALBU 4.1 3.3*       AMYLASE/LIPASE/AMMONIA  Recent Labs     09/19/22 1840   LIPASE 34       Acute Hepatitis Panel   No results found for: HEPBSAG, HEPCAB, HEPBIGM, HEPAIGM    HCV Genotype   No results found for: HEPATITISCGENOTYPE    HCV Quantitative   No results found for: HCVQNT    LIVER WORK UP:    AFP  No results found for: AFP    Alpha 1 antitrypsin   No results found for: A1A    Anti - Liver/Kidney Ab  No results found for: LIVER-KIDNEYMICROSOMALAB    NATTY  No results found for: NATTY    AMA  No results found for: MITOAB    ASMA  No results found for: SMOOTHMUSCAB    Ceruloplasmin  No results found for: CERULOPLSM    Celiac panel  No results found for: TISSTRNTIIGG, TTGIGA, IGA    IgG  No results found for: IGG    IgM  No results found for: IGM    GGT   No results found for: LABGGT    PT/INR  Recent Labs     09/20/22  0431   PROTIME 10.4   INR 1.0       Cancer Markers:  CEA:  No results found for: CEA  Ca 125:  No results found for:   Ca 19-9:   No results found for:   AFP: No results found for: AFP    Lactic acid:No results for input(s): LACTACIDWB in the last 72 hours.       Radiology Review:    CT ABDOMEN PELVIS WO CONTRAST Additional Contrast? None    Result Date: 9/19/2022  EXAMINATION: CT OF THE ABDOMEN AND PELVIS WITHOUT CONTRAST 9/19/2022 6:07 pm TECHNIQUE: CT of the abdomen and pelvis was performed without the administration of intravenous contrast. Multiplanar reformatted images are provided for review. Automated exposure control, iterative reconstruction, and/or weight based adjustment of the mA/kV was utilized to reduce the radiation dose to as low as reasonably achievable. COMPARISON: 04/27/2016 HISTORY: ORDERING SYSTEM PROVIDED HISTORY: ABDMONIAL PAIN TECHNOLOGIST PROVIDED HISTORY: ABDMONIAL PAIN Decision Support Exception - unselect if not a suspected or confirmed emergency medical condition->Emergency Medical Condition (MA) Is the patient pregnant?->No FINDINGS: Lower Chest: Subsegmental atelectasis in the lung bases. Coronary artery atherosclerosis. Organs: Liver is diffusely hypoattenuating with fatty sparing along the gallbladder fossa. Within the limitations of a noncontrast examination, no acute abnormality within the spleen, gallbladder, pancreas, or adrenal glands. There is a 2.5 x 2.3 cm solid-appearing lesion in the pancreatic head. No nephrolithiasis or hydronephrosis. GI/Bowel: Stomach is nondistended. The small bowel is nondilated. The appendix is normal in caliber. The colon is nondilated. Pelvis: The bladder is nondistended. No vesicular stone. The uterus is present. Peritoneum/Retroperitoneum: No ascites or pneumoperitoneum. Abdominal aorta is normal in caliber. There is a retroaortic left renal vein. There are shotty mesenteric and retroperitoneal lymph nodes. There is a mildly enlarged periportal node. Bones/Soft Tissues: Degenerative changes of the lower lumbar spine. 1. Hepatic steatosis. 2. Solid lesion in the pancreatic head is indeterminate. Pancreatic protocol MRI or CT is recommended for further evaluation. 3. Mildly enlarged periportal lymph node.      XR HAND LEFT (MIN 3 VIEWS)    Result Date: 9/8/2022  History:  Left hand trigger fingers Comparison:  None Findings:  3 views of the left hand AP, oblique, lateral demonstrate no acute fracture or osseus deformity. No foreign bodies. No loose bodies. No joint dislocations. Volar ossicle at 2nd MTP joint. Mild CMC degeneration. Impression:  Left hand no acute osseus abnormality, fracture, or dislocation. XR HAND RIGHT (MIN 3 VIEWS)    Result Date: 9/8/2022  History:  Right trigger fingers Comparison:  None Findings:  3 views of the right hand AP, lateral, oblique demonstrate no acute fracture or osseus deformity. No dislocations. No foreign bodies. No loose bodies. Mild degenerative CMC and DIP changes Impression:  No acute osseus abnormality, fracture, or dislocation. Mild CMC degeneration. US GI ENDOSCOPIC S&I    Result Date: 9/20/2022  Radiology exam is complete. No Radiologist dictation. Please follow up with ordering provider. MRI ABDOMEN W WO CONTRAST MRCP    Result Date: 9/20/2022  EXAMINATION: MRI OF THE ABDOMEN WITH AND WITHOUT CONTRAST AND MRCP 9/20/2022 8:10 am TECHNIQUE: Multiplanar multisequence MRI of the abdomen was performed with and without the administration of intravenous contrast. COMPARISON: Abdomen and pelvis CTs dating to 04/04/2013 HISTORY: ORDERING SYSTEM PROVIDED HISTORY: eval panc mass, abdominal pain, fever TECHNOLOGIST PROVIDED HISTORY: eval panc mass, abdominal pain, fever What is the sedation requirement?->None Reason for Exam: eval panc mass, abdominal pain, fever FINDINGS: Patient motion in some areas, partially limiting evaluation of those areas. LOWER CHEST:  [place] dependent atelectasis in the bilateral lower lobes on some sequences. Normal heart size. No pleural nor pericardial effusions. LIVER:  Mildly enlarged with the right hemiliver possibly accentuated by a normal variant Riedel's lobe. Severe steatosis with no findings of cirrhosis. GALLBLADDER/BILE DUCTS:  Normal gallbladder. No intrahepatic nor extrahepatic biliary dilation. No obvious ductal filling defects nor strictures.  PANCREAS:  Typical duct configuration without dilation. No obvious ductal filling defects nor strictures. Mildly atrophic parenchyma. 2.6 cm x 2.2 cm x 2.3 cm T2 hyperintense, T1 intermediate lesion in the uncinate process with diffuse hyperenhancement on all postcontrast sequences and no significant diffusion restriction. SPLEEN:  Normal. ADRENAL GLANDS:  Normal. KIDNEYS:  Bilateral renal peripelvic cysts, more prominent on the left. GI/BOWEL:  Normal course and caliber of the stomach, small bowel, and colon without obstruction. Normal appendix with retrocecal location. PELVIS:  Not included. PERITONEUM/RETROPERITONEUM:  Long-term stability of mildly enlarged portohepatic and portocaval lymph nodes, likely reactive. Long-term stability of nonenlarged to mildly enlarged mesenteric nodes with subtle mesenteric stranding, also likely reactive though the appearance could be seen with mesenteric panniculitis or other etiologies. No free intraperitoneal fluid. VESSELS:  Typical arterial anatomy. Patent veins. SOFT TISSUES/BONES:  Laxity of the anterior and lateral abdominal wall musculature with diastasis recti. No suspicious marrow lesions. Multilevel degenerative disc disease. 1. A 2.6 cm lesion arising from the pancreatic uncinate process has features highly suggestive of a neuroendocrine neoplasm. Consider endoscopic biopsy and serum biochemical evaluation. 2. No findings of metastatic disease in the abdomen. 3. Severe hepatic steatosis.          ENDOSCOPY   EGD/EUS    Procedure:                 Esophagogastroduodenoscopy with biopsy, polypectomy and control of bleeding and Endoscopic ultrasound   Date:                           9/20/2022   Endoscopist:             Annette Gill MD, [facility]     Indication:   Abnormal imaging of the abdomen showing pancreatic uncinate process mass and lymphadenopathy     Procedure diagnosis: Uncinate process mass suspicious for neuroendocrine tumor and peripancreatic lymph node, gastric polyp with bleeding -resected and clipped, gastritis biopsied      Principal Problem:    Acute pyelonephritis  Active Problems:    Diabetes mellitus type 2 in obese [facility])    Pancreatic mass    Generalized abdominal pain    Acute cystitis    Hyponatremia    High anion gap metabolic acidosis    Renal insufficiency    Abdominal pain  Resolved Problems:    * No resolved hospital problems. *       GI Assessment:    54-year-old female with a history of morbid obesity, T2DM, hypertension, hyperthyroidism, hypercholesterolemia, and arthritis who presented to the ED with reports of right lower quadrant pain and admitted for pyelonephritis. Work-up in the ED included CT A/P showing pancreatic lesion prompting GI consult     1. Pancreatic head lesion   -MRI showing 2.6 cm lesion arising from pancreatic uncinate process suggestive of neuroendocrine neoplasm.  - 09/20/2022- EUS -uncinate process mass suspicious for neuroendocrine tumor s/p biopsy, gastric polyp with bleeding-resected and clipped     2. Right-sided abdominal pain -pancreatic lesion vs UTI     3. Hepatic steatosis likely secondary to morbid obesity      4. UTI     5. Morbid obesity (BMI 45.0)       Plan of care:  Diet as tolerated  EUS biopsy to be followed by endoscopist in outpatient setting  Patient to follow-up with Dr. [facility] -hepato-biliary surgery  Okay to be discharged from GI perspective  GI will sign off      Time spent reviewing chart, seeing patient, and discussing with attending: Around 25 minutes    This plan was formulated in collaboration with Dr. Susi Garzon  Please feel free to contact me with any questions or concerns. Thank you for allowing me to participate in the care of your patient. PATRICIA Pratt - CNP on 9/21/2022 at 7:28 [facility] [address] Gastroenterology    Please note that this note was generated using a voice recognition dictation software.   Although every effort was made to ensure the accuracy of this automated transcription, some errors in transcription may have occurred.

## 2022-09-21 NOTE — PROGRESS NOTES
[facility]  Office: [address], DO, Wilfred Mantilla, DO, Teresa High, DO, Gladis Rose Blood, DO, Suleman Desai MD, Sy Dominique MD, Peyton York MD, Misael Velásquez MD,  Dano Holm MD, Srinivasan Thurman MD, Shanda Lewis, DO, Devora Castillo MD,  Tahir Schmid MD, Edna Naqvi MD, Michelle Hsieh DO, Radha Cheng MD, Brent Lal MD, Cydney Cha MD, Collette Hahn, MD, Allyson Nuno MD, Lukasz Green MD, Damaris Avila DO, Aden Reid MD, Ragini Berg MD, Wilfred Mcintosh, CNP,  Darrick Sheehan, CNP, Jonathon Ballard, CNP, Libra Gaytan, CNP,  Jeromy Rios, [facility], Darrius Cote, CNP, Noam Eason, CNP, Wyatt Scherer, CNP, Ashley Verdin, CNP, Nga Gruber, CNP, MARIO SheridanC, Krish Powell, CNS, Tonya Camejo, [facility], Pete Butts, CNP, Maddie Baker, CNP, Kathy Dailey, [facility]    Progress Note    9/21/2022    8:17 AM    Name:   Prakash Sagastume  MRN:     [id number]     Acct:      [de-identified]   Room:   [address] [place] [address] Day:  1  Admit Date:  9/19/2022  5:23 PM    PCP:   Sahil Walton MD  Code Status:  Full Code    Subjective:     C/C:   Chief Complaint   Patient presents with    Flank Pain     Interval History Status: not changed. About the same    Still has zachery flank pain and some abd pain also-mostly ruq    Brief History:     Per my partner:  \"Mrs. Mana Tee is a 47year old female with a significant past medical history of HTN and Type II DM who initially presented to the ED with the chief complaint of RUQ abdominal pain with radiation to right flank onset Friday 9/16/2022. Admitted to associated Fevers, Chills, Nausea and Vomiting 2-3x Friday. Abdominal pain worsened over the weekend. In the ED she was initially febrile 102.5 but otherwise hemodynamically stable. Labs reviewed hypokalemia.  Creatinine elevated but unknown baseline but patient did appear dry on admission. CT Abdomen and pelvis was done due to suspected Pyelonephritis. Demonstrated solid lesion in the pancreatic head. MRI w and wo contrast MRCP was done demonstrating a 2.6 cm lesion arising fromt he pancreatic uncinate process with features highly suggestive of neuroendocrine neoplasm. \"    Had EUS 9/20: pancreas mass biopsied  EGD 9/20: gastric polyp resected, gastritis biopsied    Review of Systems:     Constitutional:  negative for chills, fevers, sweats  Respiratory:  negative for cough, dyspnea on exertion, shortness of breath, wheezing  Cardiovascular:  negative for chest pain, chest pressure/discomfort, lower extremity edema, palpitations  Gastrointestinal:  negative for constipation, diarrhea, nausea, vomiting  Neurological:  negative for dizziness, headache    Medications: Allergies: Allergies   Allergen Reactions    Codeine     Percocet [Oxycodone-Acetaminophen]        Current Meds:   Scheduled Meds:    [MAR Hold] cefTRIAXone (ROCEPHIN) IV  1,000 mg IntraVENous Q24H    [MAR Hold] pantoprazole (PROTONIX) 40 mg injection  40 mg IntraVENous Daily    atorvastatin  20 mg Oral Daily    sodium chloride flush  5-40 mL IntraVENous 2 times per day    enoxaparin  30 mg SubCUTAneous BID    insulin lispro  0-4 Units SubCUTAneous TID WC    insulin lispro  0-4 Units SubCUTAneous Nightly     Continuous Infusions:    [MAR Hold] sodium chloride Stopped (09/20/22 1249)    lactated ringers 75 mL/hr at 09/20/22 2017    dextrose      sodium chloride 75 mL/hr at 09/20/22 0004    sodium chloride       PRN Meds: [MAR Hold] sodium chloride flush, glucose, dextrose bolus **OR** dextrose bolus, glucagon (rDNA), dextrose, sodium chloride flush, sodium chloride, ondansetron **OR** ondansetron, acetaminophen **OR** acetaminophen    Data:     Past Medical History:   has a past medical history of Arthritis, Diabetes mellitus ([address] Hypertension, and Obese. Social History:   reports that she has quit smoking.  She has never used smokeless tobacco. She reports that she does not drink alcohol and does not use drugs. Family History: History reviewed. No pertinent family history. Vitals:  /77   Pulse 74   Temp 97.9 °F (36.6 °C) (Oral)   Resp 18   Ht 4' 11\" (1.499 m)   Wt 223 lb (101.2 kg)   SpO2 96%   BMI 45.04 kg/m²   Temp (24hrs), Av.7 °F (36.5 °C), Min:96.9 °F (36.1 °C), Max:99 °F (37.2 °C)    Recent Labs     22   POCGLU 141* 161* 182*       I/O (24Hr):     Intake/Output Summary (Last 24 hours) at 2022 0817  Last data filed at 2022 1203  Gross per 24 hour   Intake 500 ml   Output --   Net 500 ml       Labs:  Hematology:  Recent Labs     22   WBC 9.2  --    RBC 3.80*  --    HGB 12.0  --    HCT 34.5*  --    MCV 90.8  --    MCH 31.6  --    MCHC 34.8  --    RDW 12.1  --      --    MPV 9.3  --    INR  --  1.0     Chemistry:  Recent Labs     22   * 136   K 3.7 3.6*   CL 97* 105   CO2 22 20   GLUCOSE 133* 198*   BUN 19 23*   CREATININE 1.36* 1.33*   MG  --  2.0   ANIONGAP 15 11   LABGLOM 41* 42*   GFRAA 49* 51*   CALCIUM 8.8 7.9*     Recent Labs     220 22   PROT 7.9  --   --  6.7  --   --    LABALBU 4.1  --   --  3.3*  --   --    LABA1C  --   --  6.7*  --   --   --    AST 34*  --   --  26  --   --    ALT 38*  --   --  30  --   --    ALKPHOS 129*  --   --  106*  --   --    BILITOT 0.8  --   --  0.5  --   --    LIPASE 34  --   --   --   --   --    POCGLU  --  141*  --   --  161* 182*     ABG:No results found for: POCPH, PHART, PH, POCPCO2, EBC6RPL, PCO2, POCPO2, PO2ART, PO2, POCHCO3, ZMJ9QOK, HCO3, NBEA, PBEA, BEART, BE, THGBART, THB, BPK5LPQ, WZUQ5PYS, C8CWRXBS, O2SAT, FIO2    Lab Results   Component Value Date/Time    CULTURE ESCHERICHIA COLI >[id number] CFU/ML (A) 2022 06:53 PM       Radiology:  CT ABDOMEN PELVIS WO CONTRAST Additional Contrast? None    Result Date: 9/19/2022  1. Hepatic steatosis. 2. Solid lesion in the pancreatic head is indeterminate. Pancreatic protocol MRI or CT is recommended for further evaluation. 3. Mildly enlarged periportal lymph node. MRI ABDOMEN W WO CONTRAST MRCP    Result Date: 9/20/2022  1. A 2.6 cm lesion arising from the pancreatic uncinate process has features highly suggestive of a neuroendocrine neoplasm. Consider endoscopic biopsy and serum biochemical evaluation. 2. No findings of metastatic disease in the abdomen. 3. Severe hepatic steatosis.        Physical Examination:        General appearance:  alert, cooperative and no distress  Mental Status:  oriented to person, place and time and normal affect  Lungs:  clear to auscultation bilaterally, normal effort  Heart:  regular rate and rhythm, no murmur  Abdomen:  soft, ttp ruq and epigastric, nondistended, normal bowel sounds, no masses, hepatomegaly, splenomegaly; obese  Extremities:  no edema, redness, tenderness in the calves  Skin:  no gross lesions, rashes, induration    Assessment:        Hospital Problems             Last Modified POA    * (Principal) Acute pyelonephritis 9/20/2022 Yes    Pancreatic mass 9/21/2022 Yes    Diabetes mellitus type 2 in obese ([facility] [address] 9/20/2022 Yes    Generalized abdominal pain 9/19/2022 Yes    E. coli UTI 9/21/2022 Yes    Hyponatremia 9/19/2022 Yes    High anion gap metabolic acidosis 5/59/2110 Yes    Renal insufficiency 9/19/2022 Yes    Abdominal pain 9/20/2022 Yes       Plan:        Await input from pancreatic surgery  Cont iv antibiotics, awaiting final sensitivities  Pt/ot evals  For unclear reasons, rocephin and protonix on hold with no way to resume-will reorder them  Anticipate discharge 24h    Dl Philip DO  9/21/2022  8:17 AM

## 2022-09-22 ENCOUNTER — APPOINTMENT (OUTPATIENT)
Dept: NUCLEAR MEDICINE | Age: 52
End: 2022-09-22
Payer: MEDICARE

## 2022-09-22 ENCOUNTER — TELEPHONE (OUTPATIENT)
Dept: SURGERY | Age: 52
End: 2022-09-22

## 2022-09-22 ENCOUNTER — APPOINTMENT (OUTPATIENT)
Dept: PHYSICAL THERAPY | Age: 52
End: 2022-09-22

## 2022-09-22 VITALS
HEART RATE: 84 BPM | SYSTOLIC BLOOD PRESSURE: 120 MMHG | DIASTOLIC BLOOD PRESSURE: 71 MMHG | HEIGHT: 59 IN | OXYGEN SATURATION: 98 % | WEIGHT: 223 LBS | TEMPERATURE: 97.7 F | RESPIRATION RATE: 16 BRPM | BODY MASS INDEX: 44.96 KG/M2

## 2022-09-22 LAB
GLUCOSE BLD-MCNC: 116 MG/DL (ref 65–105)
GLUCOSE BLD-MCNC: 152 MG/DL (ref 65–105)
HCG QUALITATIVE: NEGATIVE
LV EF: 50 %
LV EF: 77 %
LVEF MODALITY: NORMAL
LVEF MODALITY: NORMAL
PREALBUMIN: 7.5 MG/DL (ref 20–40)
SURGICAL PATHOLOGY REPORT: NORMAL

## 2022-09-22 PROCEDURE — A9500 TC99M SESTAMIBI: HCPCS | Performed by: STUDENT IN AN ORGANIZED HEALTH CARE EDUCATION/TRAINING PROGRAM

## 2022-09-22 PROCEDURE — 97535 SELF CARE MNGMENT TRAINING: CPT

## 2022-09-22 PROCEDURE — 99217 PR OBSERVATION CARE DISCHARGE MANAGEMENT: CPT | Performed by: INTERNAL MEDICINE

## 2022-09-22 PROCEDURE — 2580000003 HC RX 258: Performed by: STUDENT IN AN ORGANIZED HEALTH CARE EDUCATION/TRAINING PROGRAM

## 2022-09-22 PROCEDURE — 78452 HT MUSCLE IMAGE SPECT MULT: CPT

## 2022-09-22 PROCEDURE — 6370000000 HC RX 637 (ALT 250 FOR IP): Performed by: INTERNAL MEDICINE

## 2022-09-22 PROCEDURE — 3430000000 HC RX DIAGNOSTIC RADIOPHARMACEUTICAL: Performed by: STUDENT IN AN ORGANIZED HEALTH CARE EDUCATION/TRAINING PROGRAM

## 2022-09-22 PROCEDURE — 97166 OT EVAL MOD COMPLEX 45 MIN: CPT

## 2022-09-22 PROCEDURE — 96361 HYDRATE IV INFUSION ADD-ON: CPT

## 2022-09-22 PROCEDURE — G0378 HOSPITAL OBSERVATION PER HR: HCPCS

## 2022-09-22 PROCEDURE — 6360000002 HC RX W HCPCS: Performed by: STUDENT IN AN ORGANIZED HEALTH CARE EDUCATION/TRAINING PROGRAM

## 2022-09-22 PROCEDURE — 97530 THERAPEUTIC ACTIVITIES: CPT

## 2022-09-22 PROCEDURE — 93017 CV STRESS TEST TRACING ONLY: CPT | Performed by: NURSE PRACTITIONER

## 2022-09-22 PROCEDURE — 82947 ASSAY GLUCOSE BLOOD QUANT: CPT

## 2022-09-22 PROCEDURE — 97162 PT EVAL MOD COMPLEX 30 MIN: CPT

## 2022-09-22 PROCEDURE — 93306 TTE W/DOPPLER COMPLETE: CPT

## 2022-09-22 RX ORDER — SODIUM CHLORIDE 0.9 % (FLUSH) 0.9 %
5-40 SYRINGE (ML) INJECTION PRN
Status: DISCONTINUED | OUTPATIENT
Start: 2022-09-22 | End: 2022-09-22

## 2022-09-22 RX ORDER — SODIUM CHLORIDE 0.9 % (FLUSH) 0.9 %
10 SYRINGE (ML) INJECTION PRN
Status: DISCONTINUED | OUTPATIENT
Start: 2022-09-22 | End: 2022-09-22 | Stop reason: HOSPADM

## 2022-09-22 RX ORDER — PANTOPRAZOLE SODIUM 40 MG/1
40 TABLET, DELAYED RELEASE ORAL
Qty: 30 TABLET | Refills: 3 | Status: SHIPPED | OUTPATIENT
Start: 2022-09-23

## 2022-09-22 RX ORDER — CIPROFLOXACIN 500 MG/1
500 TABLET, FILM COATED ORAL EVERY 12 HOURS SCHEDULED
Status: DISCONTINUED | OUTPATIENT
Start: 2022-09-22 | End: 2022-09-22 | Stop reason: HOSPADM

## 2022-09-22 RX ORDER — AMINOPHYLLINE DIHYDRATE 25 MG/ML
50 INJECTION, SOLUTION INTRAVENOUS PRN
Status: DISCONTINUED | OUTPATIENT
Start: 2022-09-22 | End: 2022-09-22

## 2022-09-22 RX ORDER — ATROPINE SULFATE 0.1 MG/ML
0.5 INJECTION INTRAVENOUS EVERY 5 MIN PRN
Status: DISCONTINUED | OUTPATIENT
Start: 2022-09-22 | End: 2022-09-22

## 2022-09-22 RX ORDER — NITROGLYCERIN 0.4 MG/1
0.4 TABLET SUBLINGUAL EVERY 5 MIN PRN
Status: DISCONTINUED | OUTPATIENT
Start: 2022-09-22 | End: 2022-09-22

## 2022-09-22 RX ORDER — METOPROLOL TARTRATE 5 MG/5ML
5 INJECTION INTRAVENOUS EVERY 5 MIN PRN
Status: DISCONTINUED | OUTPATIENT
Start: 2022-09-22 | End: 2022-09-22

## 2022-09-22 RX ORDER — SODIUM CHLORIDE 9 MG/ML
500 INJECTION, SOLUTION INTRAVENOUS CONTINUOUS PRN
Status: DISCONTINUED | OUTPATIENT
Start: 2022-09-22 | End: 2022-09-22

## 2022-09-22 RX ORDER — ALBUTEROL SULFATE 90 UG/1
2 AEROSOL, METERED RESPIRATORY (INHALATION) PRN
Status: DISCONTINUED | OUTPATIENT
Start: 2022-09-22 | End: 2022-09-22

## 2022-09-22 RX ORDER — CIPROFLOXACIN 500 MG/1
500 TABLET, FILM COATED ORAL EVERY 12 HOURS SCHEDULED
Qty: 9 TABLET | Refills: 0 | Status: SHIPPED | OUTPATIENT
Start: 2022-09-22 | End: 2022-09-27

## 2022-09-22 RX ADMIN — TETRAKIS(2-METHOXYISOBUTYLISOCYANIDE)COPPER(I) TETRAFLUOROBORATE 13.8 MILLICURIE: 1 INJECTION, POWDER, LYOPHILIZED, FOR SOLUTION INTRAVENOUS at 10:06

## 2022-09-22 RX ADMIN — SODIUM CHLORIDE, PRESERVATIVE FREE 10 ML: 5 INJECTION INTRAVENOUS at 11:50

## 2022-09-22 RX ADMIN — REGADENOSON 0.4 MG: 0.08 INJECTION, SOLUTION INTRAVENOUS at 11:49

## 2022-09-22 RX ADMIN — SODIUM CHLORIDE, PRESERVATIVE FREE 10 ML: 5 INJECTION INTRAVENOUS at 11:30

## 2022-09-22 RX ADMIN — SODIUM CHLORIDE, PRESERVATIVE FREE 10 ML: 5 INJECTION INTRAVENOUS at 10:06

## 2022-09-22 RX ADMIN — TETRAKIS(2-METHOXYISOBUTYLISOCYANIDE)COPPER(I) TETRAFLUOROBORATE 39.3 MILLICURIE: 1 INJECTION, POWDER, LYOPHILIZED, FOR SOLUTION INTRAVENOUS at 11:50

## 2022-09-22 RX ADMIN — PANTOPRAZOLE SODIUM 40 MG: 40 TABLET, DELAYED RELEASE ORAL at 09:30

## 2022-09-22 RX ADMIN — CIPROFLOXACIN HYDROCHLORIDE 500 MG: 500 TABLET, FILM COATED ORAL at 09:30

## 2022-09-22 NOTE — DISCHARGE SUMMARY
[facility]  Office: [address], DO, Gwen Nyhan, DO, Thais Mensah, DO, Cyndymolly Judge Blood, DO, Monika Clark MD, Toshia Barnhart MD, Balbir Dodge MD, Sohan Figueroa MD,  Angela Zavaleta MD, Aggie Chew MD, Gricel Harris, DO, Isaac Villalba MD,  Kelsea Garcia, DO, Devonte Talamantes MD, Yuliana Bonds MD, Arnold Lopez, DO, Stef Jack MD, Marcelina Coombs MD, Vassie Holstein, MD, Rama Fierro MD, Héctor Jones MD, Óscar Guillen MD, Nathan Sen DO, Heber Scott MD, Juventino Fuentes MD, Yazmin Richards, CNP,  Aniceto Monroe, CNP, Haris Ness, CNP, Leatha Chapman, CNP,  Catalino Kwan, DNP, Dakotah Medina, CNP, Bridget Cruz, CNP, Stefan Cannon, CNP, Ceci Guerra, CNP, Symone Andres, CNP, Arley Garcia PANATHAN, Arie Velasco, CNS, Clifford Silva, [facility], Darci Billingsley, CNP, Lori Shankar, CNP, Debbie Mishra, [facility]    Discharge Summary     Patient ID: Tammie Lawson  :  1970   MRN: [id number]     ACCOUNT:  [de-identified]   Patient's PCP: Tayla Hale MD  Admit Date: 2022   Discharge Date: 2022     Length of Stay: 1  Code Status:  Full Code  Admitting Physician: Bre Philip DO  Discharge Physician: Bre Philip DO     Active Discharge Diagnoses:     Hospital Problem Lists:  Principal Problem:    Acute pyelonephritis  Active Problems:    Pancreatic mass    Diabetes mellitus type 2 in obese (HCC)    Generalized abdominal pain    E. coli UTI    Hyponatremia    High anion gap metabolic acidosis    Renal insufficiency    Abdominal pain  Resolved Problems:    * No resolved hospital problems.  *      Admission Condition:  fair     Discharged Condition: stable    Hospital Stay:     Hospital Course:  Tammie Lawson is a 47 y.o. female who was admitted for the management of   Acute pyelonephritis , presented to ER with Flank Pain    Admitted with abd and flank pain and found to have pyelonephritis. Initially placed on rocephin, cultures showed e coli sensitive to cipro so switched to that for discharge. Also incidentally found to have uncinate process pancreatic mass which was biopsied by GI and pathology came back suspicious for neuroendocrine tumor    Pancreatic surgery consulted who will follow up as outpatient but asked for cardiac workup preop. Seen by cardio and stress and echo ordered, results pending at time of discharge      Significant therapeutic interventions: see above    Significant Diagnostic Studies:   Labs / Micro:  CBC:   Lab Results   Component Value Date/Time    WBC 9.2 09/19/2022 06:40 PM    RBC 3.80 09/19/2022 06:40 PM    RBC 3.73 05/07/2012 10:45 AM    HGB 12.0 09/19/2022 06:40 PM    HCT 34.5 09/19/2022 06:40 PM    MCV 90.8 09/19/2022 06:40 PM    MCH 31.6 09/19/2022 06:40 PM    MCHC 34.8 09/19/2022 06:40 PM    RDW 12.1 09/19/2022 06:40 PM     09/19/2022 06:40 PM     05/07/2012 10:45 AM     CMP:    Lab Results   Component Value Date/Time    GLUCOSE 198 09/20/2022 04:31 AM    GLUCOSE 104 05/07/2012 10:45 AM     09/20/2022 04:31 AM    K 3.6 09/20/2022 04:31 AM     09/20/2022 04:31 AM    CO2 20 09/20/2022 04:31 AM    BUN 23 09/20/2022 04:31 AM    CREATININE 1.33 09/20/2022 04:31 AM    ANIONGAP 11 09/20/2022 04:31 AM    ALKPHOS 106 09/20/2022 04:31 AM    ALT 30 09/20/2022 04:31 AM    AST 26 09/20/2022 04:31 AM    BILITOT 0.5 09/20/2022 04:31 AM    LABALBU 3.3 09/20/2022 04:31 AM    LABALBU 4.4 05/07/2012 10:45 AM    ALBUMIN 1.0 09/20/2022 04:31 AM    LABGLOM 42 09/20/2022 04:31 AM    GFRAA 51 09/20/2022 04:31 AM    GFR      09/20/2022 04:31 AM    PROT 6.7 09/20/2022 04:31 AM    CALCIUM 7.9 09/20/2022 04:31 AM        Radiology:  CT ABDOMEN PELVIS WO CONTRAST Additional Contrast? None    Result Date: 9/19/2022  1. Hepatic steatosis. 2. Solid lesion in the pancreatic head is indeterminate.   Pancreatic protocol MRI or CT is recommended for further evaluation. 3. Mildly enlarged periportal lymph node. MRI ABDOMEN W WO CONTRAST MRCP    Result Date: 9/20/2022  1. A 2.6 cm lesion arising from the pancreatic uncinate process has features highly suggestive of a neuroendocrine neoplasm. Consider endoscopic biopsy and serum biochemical evaluation. 2. No findings of metastatic disease in the abdomen. 3. Severe hepatic steatosis. Consultations:    Consults:     Final Specialist Recommendations/Findings:   IP CONSULT TO HOSPITALIST  IP CONSULT TO GI  IP CONSULT TO GENERAL SURGERY  IP CONSULT TO CARDIOLOGY      The patient was seen and examined on day of discharge and this discharge summary is in conjunction with any daily progress note from day of discharge.     Discharge plan:     Disposition: Home    Physician Follow Up:     Zehra Miller MD  [address] [facility]  2nd [address] [facility] [address]  [phone]    Follow up in 1 week(s)      Anupama Daniel MD  [address] [facility] [address]  [address]  [phone]    Follow up in 2 week(s)      Nae Allison MD  [address]  [phone]    Schedule an appointment as soon as possible for a visit  preop testing       Requiring Further Evaluation/Follow Up POST HOSPITALIZATION/Incidental Findings: follow up on cardiac testing    Diet: diabetic diet    Activity: As tolerated    Instructions to Patient: take medications as prescribed      Discharge Medications:      Medication List        START taking these medications      ciprofloxacin 500 MG tablet  Commonly known as: CIPRO  Take 1 tablet by mouth every 12 hours for 9 doses     pantoprazole 40 MG tablet  Commonly known as: PROTONIX  Take 1 tablet by mouth every morning (before breakfast)  Start taking on: September 23, 2022            CHANGE how you take these medications      ondansetron 4 MG tablet  Commonly known as: Zofran  Take 1 tablet by mouth every 8 hours as needed for Nausea  What changed: Another medication with the same name was removed. Continue taking this medication, and follow the directions you see here. CONTINUE taking these medications      acetaminophen 325 MG tablet  Commonly known as: TYLENOL     benzonatate 100 MG capsule  Commonly known as: TESSALON  Take 1 capsule by mouth 3 times daily as needed for Cough     docusate sodium 100 MG capsule  Commonly known as: Colace  Take 1 capsule by mouth 2 times daily     LISINOPRIL PO     METFORMIN HCL PO     simvastatin 40 MG tablet  Commonly known as: ZOCOR            STOP taking these medications      lisinopril-hydroCHLOROthiazide 20-12.5 MG per tablet  Commonly known as: PRINZIDE;ZESTORETIC     meloxicam 15 MG tablet  Commonly known as: Mobic               Where to Get Your Medications        These medications were sent to [facility] [address] [facility] [address] [place] [address]      Phone: [phone]   ciprofloxacin 500 MG tablet  pantoprazole 40 MG tablet         No discharge procedures on file. Time Spent on discharge is  20 mins in patient examination, evaluation, counseling as well as medication reconciliation, prescriptions for required medications, discharge plan and follow up. Electronically signed by   Srinivasa Philip DO  9/22/2022  12:45 PM      Thank you Dr. Doreen Matta MD for the opportunity to be involved in this patient's care.

## 2022-09-22 NOTE — CONSULTS
Attestation signed by      Attending Physician Statement:    I have discussed the care of  Gertrudis Talamantes , including pertinent history and exam findings, with the Cardiology fellow/resident. I have seen and examined the patient and the key elements of all parts of the encounter have been performed by me. I agree with the assessment, plan and orders as documented by the fellow/resident, after I modified exam findings and plan of treatments, and the final version is my approved version of the assessment. Additional Comments:   Further plan after the Lexiscan stress test  Dr. Marycarmen Shah Cardiology Cardiology    Consult / H&P               Today's Date: 9/22/2022  Patient Name: Gertrudis Talamantes  Date of admission: 9/19/2022  5:23 PM  Patient's age: 47 y. o., 1970  Admission Dx: Pyelonephritis [N12]  Abdominal pain [R10.9]    Reason for Consult:  Cardiac evaluation    Requesting Physician: Samantha Philip DO    CHIEF COMPLAINT:      History Obtained From:  patient    HISTORY OF PRESENT ILLNESS:      The patient is a 47 y.o. female with history of hypertension, dyslipidemia, diabetes mellitus, obesity presented with complaint of right upper quadrant abdominal pain. CT in ER showed pancreatic lesion. GI consulted. Had EUS and biopsy done. General surgery following for pancreatic mass. Cardiology was consulted for preoperative restratification. Patient denies any chest pain, shortness of breath, palpitation, dizziness or lightheadedness. No signs of volume overload. ECG normal sinus rhythm with Q-wave in lead III. No acute ST-T wave changes    Past Medical History:   has a past medical history of Arthritis, Diabetes mellitus ([address] Hypertension, and Obese. Past Surgical History:   has a past surgical history that includes hernia repair; Tubal ligation; Upper gastrointestinal endoscopy (N/A, 9/20/2022); and Upper gastrointestinal endoscopy (9/20/2022).      Home Medications:    Prior to Admission medications    Medication Sig Start Date End Date Taking? Authorizing Provider   meloxicam (MOBIC) 15 MG tablet Take 1 tablet by mouth daily 7/13/22   Gildardo Squires DO   docusate sodium (COLACE) 100 MG capsule Take 1 capsule by mouth 2 times daily 11/3/17   Jeffrey Glenwood, APRN - CNP   simvastatin (ZOCOR) 40 MG tablet Take 40 mg by mouth nightly    Historical Provider, MD   meloxicam (MOBIC) 15 MG tablet Take 15 mg by mouth daily    Historical Provider, MD   acetaminophen (TYLENOL) 325 MG tablet Take 650 mg by mouth every 6 hours as needed for Pain    Historical Provider, MD   ondansetron (ZOFRAN) 4 MG tablet Take 1 tablet by mouth every 8 hours as needed for Nausea 5/22/17   Sergio Jasmine DO   ondansetron [facility]) 4 MG tablet Take 1 tablet by mouth every 8 hours as needed for Nausea 4/27/16   Haylee Andrea MD PhD   lisinopril-hydrochlorothiazide (PRINZIDE;ZESTORETIC) 20-12.5 MG per tablet Take 1 tablet by mouth daily    Historical Provider, MD   benzonatate (TESSALON) 100 MG capsule Take 1 capsule by mouth 3 times daily as needed for Cough 5/4/15   David Anders DO   METFORMIN HCL PO Take 1,000 mg by mouth 2 times daily. Historical Provider, MD   LISINOPRIL PO Take 20 mg by mouth daily.     Historical Provider, MD      Current Facility-Administered Medications: pantoprazole (PROTONIX) tablet 40 mg, 40 mg, Oral, QAM AC  cefTRIAXone (ROCEPHIN) 1,000 mg in sterile water 10 mL IV syringe, 1,000 mg, IntraVENous, Q24H  [MAR Hold] sodium chloride flush 0.9 % injection 10 mL, 10 mL, IntraVENous, PRN  [MAR Hold] 0.9 % sodium chloride infusion, , IntraVENous, Continuous  atorvastatin (LIPITOR) tablet 20 mg, 20 mg, Oral, Daily  glucose chewable tablet 16 g, 4 tablet, Oral, PRN  dextrose bolus 10% 125 mL, 125 mL, IntraVENous, PRN **OR** dextrose bolus 10% 250 mL, 250 mL, IntraVENous, PRN  glucagon (rDNA) injection 1 mg, 1 mg, SubCUTAneous, PRN  dextrose 10 % infusion, , IntraVENous, Continuous PRN  0.9 % sodium chloride infusion, , IntraVENous, Continuous  sodium chloride flush 0.9 % injection 5-40 mL, 5-40 mL, IntraVENous, 2 times per day  sodium chloride flush 0.9 % injection 5-40 mL, 5-40 mL, IntraVENous, PRN  0.9 % sodium chloride infusion, , IntraVENous, PRN  enoxaparin Sodium (LOVENOX) injection 30 mg, 30 mg, SubCUTAneous, BID  ondansetron (ZOFRAN-ODT) disintegrating tablet 4 mg, 4 mg, Oral, Q8H PRN **OR** ondansetron (ZOFRAN) injection 4 mg, 4 mg, IntraVENous, Q6H PRN  acetaminophen (TYLENOL) tablet 650 mg, 650 mg, Oral, Q6H PRN **OR** acetaminophen (TYLENOL) suppository 650 mg, 650 mg, Rectal, Q6H PRN  insulin lispro (HUMALOG) injection vial 0-4 Units, 0-4 Units, SubCUTAneous, TID WC  insulin lispro (HUMALOG) injection vial 0-4 Units, 0-4 Units, SubCUTAneous, Nightly    Allergies:  Codeine and Percocet [oxycodone-acetaminophen]    Social History:   reports that she has quit smoking. She has never used smokeless tobacco. She reports that she does not drink alcohol and does not use drugs. Family History: family history is not on file. No h/o sudden cardiac death. No for premature CAD    REVIEW OF SYSTEMS:    Constitutional: there has been no unanticipated weight loss. There's been No change in energy level, No change in activity level. Eyes: No visual changes or diplopia. No scleral icterus. ENT: No Headaches  Cardiovascular: No cardiac history  Respiratory: No previous pulmonary problems, No cough  Gastrointestinal: No abdominal pain. No change in bowel or bladder habits. Genitourinary: No dysuria, trouble voiding, or hematuria. Musculoskeletal:  No gait disturbance, No weakness or joint complaints.       PHYSICAL EXAM:      /74   Pulse 91   Temp 98.4 °F (36.9 °C) (Oral)   Resp 18   Ht 4' 11\" (1.499 m)   Wt 223 lb (101.2 kg)   SpO2 98%   BMI 45.04 kg/m²    Constitutional and General Appearance: alert, cooperative, no distress and appears stated age  [de-identified]: PERRL, no cervical lymphadenopathy. No masses palpable. Normal oral mucosa  Respiratory:  Normal excursion and expansion without use of accessory muscles  Resp Auscultation: Good respiratory effort. No for increased work of breathing. On auscultation: clear to auscultation bilaterally  Cardiovascular: The apical impulse is not displaced  Heart tones are crisp and normal. regular S1 and S2.  Jugular venous pulsation Normal  The carotid upstroke is normal in amplitude and contour without delay or bruit  Peripheral pulses are symmetrical and full   Abdomen:   No masses or tenderness  Bowel sounds present  Extremities:   No Cyanosis or Clubbing   Lower extremity edema: No   Skin: Warm and dry    DATA:    Diag    CBC:   Recent Labs     09/19/22 1840   WBC 9.2   HGB 12.0   HCT 34.5*        BMP:   Recent Labs     09/19/22 1840 09/20/22 0431   * 136   K 3.7 3.6*   CO2 22 20   BUN 19 23*   CREATININE 1.36* 1.33*   LABGLOM 41* 42*   GLUCOSE 133* 198*     BNP: No results for input(s): BNP in the last 72 hours. PT/INR:   Recent Labs     09/20/22 0431   PROTIME 10.4   INR 1.0     APTT:No results for input(s): APTT in the last 72 hours. CARDIAC ENZYMES:No results for input(s): CKTOTAL, CKMB, CKMBINDEX, TROPONINI in the last 72 hours. FASTING LIPID PANEL:  Lab Results   Component Value Date/Time    HDL 39 04/07/2017 11:10 AM    TRIG 191 04/07/2017 11:10 AM     LIVER PROFILE:  Recent Labs     09/19/22 1840 09/20/22  0431   AST 34* 26   ALT 38* 30   LABALBU 4.1 3.3*       IMPRESSION:    Patient Active Problem List   Diagnosis    Acute pyelonephritis    Diabetes mellitus type 2 in [facility])    Pancreatic mass    Generalized abdominal pain    E. coli UTI    Hyponatremia    High anion gap metabolic acidosis    Renal insufficiency    Abdominal pain     Nuclear stress test 2014. No evidence of ischemia infarction      Preoperative stratification for pancreatic mass surgery.   Hypertension  Diabetes mellitus  Dyslipidemia  Acute kidney injury    RECOMMENDATIONS:  ECG showed normal sinus rhythm with Q-wave in lead III. No significant ST-T wave changes suggestive of acute ischemia. RCRI score 1. Risk of Mace at 30-day. 6%. Patient has significant risk factor for coronary artery disease. Will obtain stress test.  Risk stratification based on stress test results. Plan for surgery as outpatient. Discussed with patient and Nurse.     Electronically signed by Michel Hunt MD on 9/22/2022 at 7:32 [address] [place] [address] [facility]      [phone]

## 2022-09-22 NOTE — PROGRESS NOTES
Occupational Therapy  Facility/Department: [facility] 3C OBSERVATION  Occupational Therapy Initial Assessment    Name: Sujatha Hui  : 1970  MRN: [id number]  Date of Service: 2022  Chief Complaint   Patient presents with    Flank Pain     Discharge Recommendations:   No therapy recommended at discharge. OT Equipment Recommendations  Equipment Needed: Yes  Mobility Devices: ADL Assistive Devices  ADL Assistive Devices: Shower Chair with back     Patient Diagnosis(es): The primary encounter diagnosis was Acute pyelonephritis. A diagnosis of Pancreatic mass was also pertinent to this visit. Past Medical History:  has a past medical history of Arthritis, Diabetes mellitus ([facility] [address] Hypertension, and Obese. Past Surgical History:  has a past surgical history that includes hernia repair; Tubal ligation; Upper gastrointestinal endoscopy (N/A, 2022); and Upper gastrointestinal endoscopy (2022). Assessment   Performance deficits / Impairments: Decreased functional mobility ; Decreased ADL status; Decreased endurance;Decreased high-level IADLs;Decreased safe awareness;Decreased balance;Decreased cognition  Assessment: Patient supine upon arrival, noteably soiled with patient unaware. Pt completed bed mobility at A and doffed gown and donned clean standing EOB at Supervision; Pt sat EOB using modified figure four to don B socks; Pt demonstrates SOB with functional tasks, required VC throughout for implementation of rest breaks and use of pursed lip breathing appropriately. Pt would benefit from continued acute OT services to address functional deficits impacting performance and safety with ADLs and functional tasks for safe return to prior living environment.   Prognosis: Good  Decision Making: Medium Complexity  REQUIRES OT FOLLOW-UP: Yes  Activity Tolerance  Activity Tolerance: Patient Tolerated treatment well;Patient limited by fatigue        Plan   Plan  Times per Week: 2-3x total  Current Treatment Recommendations: Patient/Caregiver education & training, Equipment evaluation, education, & procurement, Safety education & training, Self-Care / ADL, Home management training, Endurance training     Restrictions  Position Activity Restriction  Other position/activity restrictions: Up with assist    Subjective   General  Patient assessed for rehabilitation services?: Yes  Family / Caregiver Present: No  General Comment  Comments: RN ok'd patient for OT/PT evaluation. Pt pleasant, cooperative and agreeable throughout. Pt reports 4/10 pain in abdomen. Social/Functional History  Social/Functional History  Lives With: Son (34 yo old)  Type of Home: House  Home Layout: One level  Home Access: Stairs to enter with rails  Entrance Stairs - Number of Steps: 3  Entrance Stairs - Rails: Right  Bathroom Shower/Tub: Tub/Shower unit  Bathroom Toilet: Standard  Home Equipment: Elige Wilfrid, Electric scooter, Walker, rolling (varies on what equipment is utilized)  ADL Assistance: Independent  Homemaking Assistance: Independent  Homemaking Responsibilities: Yes  Meal Prep Responsibility: Primary  Laundry Responsibility: Primary  Cleaning Responsibility: Primary  Shopping Responsibility: Primary  Other (Comment): Cats and dogs; 9 cats and 1 dog  Ambulation Assistance: Independent  Transfer Assistance: Independent  Active : No  Patient's  Info: uses electric scooter  Occupation: On disability  Leisure & Hobbies: Park, shop     Objective   Safety Devices  Type of Devices: Nurse notified; Left in chair;Call light within reach;Gait belt  Restraints  Restraints Initially in Place: No  Balance  Sitting: Without support (EOB for ~5-6 min, unsupported in chair for ~1-2 min)  Standing: Without support (SBA EOB to engage in dressing tasks and linen change; ~3-4 min)  Transfer Training  Transfer Training: Yes  Overall Level of Assistance: Stand-by assistance  Sit to Stand: Stand-by assistance  Stand to Sit: Stand-by assistance  Gait  Overall Level of Assistance: Stand-by assistance (noteable SOB with short distances; Pt educated on use of pursed lip breathing tech with fair return)  Assistive Device: Gait belt     AROM: Within functional limits  Strength: Within functional limits (B UE strength 4/5 grossly)  Coordination: Within functional limits  Tone: Normal  Sensation: Intact  ADL  Feeding: Independent  Grooming: Independent  UE Bathing: Increased time to complete;Modified independent   LE Bathing: Increased time to complete;Stand by assistance  UE Dressing: Increased time to complete;Supervision  LE Dressing: Increased time to complete;Stand by assistance  Toileting: Increased time to complete;Stand by assistance  Additional Comments: Patient noted to have soiled bed upon arrival; Pt completed UB dressing standing EOB at Supervision for safety; Pt sat EOB and donned B socks using environmental support for a modified figure four method with good success. Pt noted to have SOB and rest break implemented; Activity Tolerance  Activity Tolerance: Patient tolerated evaluation without incident  Bed mobility  Supine to Sit: Stand by assistance  Sit to Supine: Stand by assistance  Scooting: Stand by assistance     Vision  Vision: Impaired  Vision Exceptions: Wears glasses at all times  Hearing  Hearing: Within functional limits  Cognition  Overall Cognitive Status: WFL  Orientation  Overall Orientation Status: Within Functional Limits    Education Given To: Patient  Education Provided: Role of Therapy;Plan of Care;ADL Adaptive Strategies; Energy Conservation;Transfer Training; Fall Prevention Strategies;Precautions  Education Method: Demonstration;Verbal;Teach Back  Education Outcome: Continued education needed;Verbalized understanding  AM-PAC Score        AM-PAC Inpatient Daily Activity Raw Score: 20 (09/22/22 1536)  AM-PAC Inpatient ADL T-Scale Score : 42.03 (09/22/22 1536)  ADL Inpatient CMS 0-100% Score: 38.32 (09/22/22 1536)  ADL Inpatient CMS G-Code Modifier : CJ (09/22/22 1536)  Goals  Short Term Goals  Time Frame for Short term goals: Patient will, by discharge  Short Term Goal 1: demo ADLs independently  Short Term Goal 2: demo use of EC/WS techs independently with functional tasks to reduce exertion  Short Term Goal 3: demo functional transfers/mobility using LRD at Mod I to engage in ADL/IADLs  Short Term Goal 4: demo good safety awareness during functional tasks with 0 VCs     Therapy Time   Individual Concurrent Group Co-treatment   Time In 0830         Time Out 0854         Minutes 24         Timed Code Treatment Minutes: [address], OTR/L

## 2022-09-22 NOTE — PROGRESS NOTES
Physical Therapy  Facility/Department: [facility] 3C OBSERVATION  Physical Therapy Initial Assessment    Name: Tommy Garza  : 1970  MRN: [id number]  Date of Service: 2022  Chief Complaint   Patient presents with    Flank Pain      Discharge Recommendations:  No therapy recommended at discharge       Patient Diagnosis(es): The primary encounter diagnosis was Acute pyelonephritis. A diagnosis of Pancreatic mass was also pertinent to this visit. Past Medical History:  has a past medical history of Arthritis, Diabetes mellitus ([address] Hypertension, and Obese. Past Surgical History:  has a past surgical history that includes hernia repair; Tubal ligation; Upper gastrointestinal endoscopy (N/A, 2022); and Upper gastrointestinal endoscopy (2022). Assessment   Assessment: the pt ambulated 150 ft without a device x SBA. She was mildly SOB following ambulation but is at her baseline. No further PT intervention is needed at this time  Therapy Prognosis: Good  Decision Making: Medium Complexity  Requires PT Follow-Up: No  Activity Tolerance  Activity Tolerance: Patient tolerated evaluation without incident     Plan   Plan  Plan: Discharge with evaluation only  Safety Devices  Type of Devices: Nurse notified, Left in chair, Call light within reach, Gait belt     Restrictions  Position Activity Restriction  Other position/activity restrictions:  Up with assist     Subjective   General  Patient assessed for rehabilitation services?: Yes  Response To Previous Treatment: Not applicable  Family / Caregiver Present: No  Follows Commands: Within Functional Limits  Subjective  Subjective: Pt reports 4/10 abd pain L side         Social/Functional History  Social/Functional History  Lives With: Son (36 yo old)  Type of Home: House  Home Layout: One level  Home Access: Stairs to enter with rails  Entrance Stairs - Number of Steps: 3  Entrance Stairs - Rails: Right  Bathroom Shower/Tub: Tub/Shower unit  Bathroom Toilet: Standard  Home Equipment: Marge Peak, Electric scooter, Walker, rolling (varies on what equipment is utilized)  ADL Assistance: Independent  Homemaking Assistance: Independent  Homemaking Responsibilities: Yes  Meal Prep Responsibility: Primary  Laundry Responsibility: Primary  Cleaning Responsibility: Primary  Shopping Responsibility: Primary  Other (Comment): Cats and dogs; 9 cats and 1 dog  Ambulation Assistance: Independent  Transfer Assistance: Independent  Active : No  Patient's  Info: uses electric scooter  Occupation: On disability  Leisure & Hobbies: Park, shop  Vision/Hearing  Vision  Vision: Impaired  Vision Exceptions: Wears glasses at all times  Hearing  Hearing: Within functional limits    Cognition   Orientation  Overall Orientation Status: Within Functional Limits  Cognition  Overall Cognitive Status: WFL     Objective   Heart Rate: 84  Heart Rate Source: Monitor  BP: 120/71  MAP (Calculated): 87.33  Resp: 16  SpO2: 98 %     AROM RLE (degrees)  RLE AROM: WNL  AROM LLE (degrees)  LLE AROM : WNL  AROM RUE (degrees)  RUE AROM : WNL  AROM LUE (degrees)  LUE AROM : WNL  Strength RLE  Strength RLE: WNL  Strength LLE  Strength LLE: WNL  Strength RUE  Strength RUE: WFL  Strength LUE  Strength LUE: WFL     Bed mobility  Supine to Sit: Stand by assistance  Sit to Supine: Stand by assistance  Scooting: Stand by assistance  Transfers  Sit to Stand: Stand by assistance  Stand to sit: Stand by assistance  Ambulation  Surface: level tile  Device: No Device  Assistance: Stand by assistance  Distance: amb 150 ft wiithout a device x SBA     Balance  Posture: Good  Sitting - Static: Good  Sitting - Dynamic: Fair  Standing - Static: Good  Standing - Dynamic: Good     OutComes Score     AM-PAC Score  AM-PAC Inpatient Mobility Raw Score : 22 (09/22/22 1119)  AM-PAC Inpatient T-Scale Score : 53.28 (09/22/22 1119)  Mobility Inpatient CMS 0-100% Score: 20.91 (09/22/22 1119)  Mobility Inpatient CMS G-Code Modifier : CJ (09/22/22 1119)     Tinneti Score     Goals  Short Term Goals  Time Frame for Short term goals: No PT needs at this time. The pt is at her baseline.    DC   PT     Education  Patient Education  Education Given To: Patient  Education Provided: Role of Therapy;Plan of Care  Education Method: Verbal  Barriers to Learning: None  Education Outcome: Verbalized understanding    Therapy Time   Individual Concurrent Group Co-treatment   Time In 0830         Time Out 0855         Minutes [address] [facility],

## 2022-09-22 NOTE — PROGRESS NOTES
Surgical Oncology:  Daily Progress Note                    PATIENT NAME: Al Bishop     TODAY'S DATE: 9/22/2022, 7:50 AM  CC:  right sided abdominal pain    SUBJECTIVE:     Pt seen and examined at bedside this morning, no acute events overnight. Patient denies nausea or emesis. Continues to have small amount of abdominal pain though states it is improving. Tolerating diet, passing flatus. OBJECTIVE:   VITALS:  /74   Pulse 91   Temp 98.4 °F (36.9 °C) (Oral)   Resp 18   Ht 4' 11\" (1.499 m)   Wt 223 lb (101.2 kg)   SpO2 98%   BMI 45.04 kg/m²      INTAKE/OUTPUT:    No intake or output data in the 24 hours ending 09/22/22 0750      PHYSICAL EXAM:  CONSTITUTIONAL:  awake, alert, not distressed and morbidly obese  HEENT: Normocephalic/atraumatic, without obvious abnormality. NECK:  Supple, symmetrical, trachea midline   CARDIOVASCULAR: Regular rate and rhythm without murmurs. LUNGS: Good chest rise bilaterally, no accessory muscle use  ABDOMEN: Soft, mildly tender right hemiabdomen, not distended  MUSCULOSKELETAL: Muscle strength intact in all extremities bilaterally. NEUROLOGIC: CN II- XII intact. Gross motor intact without focal weakness.   SKIN: No cyanosis, rashes  Orientation:   oriented to person, place, and time    Data:  CBC with Differential:    Lab Results   Component Value Date/Time    WBC 9.2 09/19/2022 06:40 PM    RBC 3.80 09/19/2022 06:40 PM    RBC 3.73 05/07/2012 10:45 AM    HGB 12.0 09/19/2022 06:40 PM    HCT 34.5 09/19/2022 06:40 PM     09/19/2022 06:40 PM     05/07/2012 10:45 AM    MCV 90.8 09/19/2022 06:40 PM    MCH 31.6 09/19/2022 06:40 PM    MCHC 34.8 09/19/2022 06:40 PM    RDW 12.1 09/19/2022 06:40 PM    LYMPHOPCT 20 09/19/2022 06:40 PM    MONOPCT 8 09/19/2022 06:40 PM    BASOPCT 0 09/19/2022 06:40 PM    MONOSABS 0.73 09/19/2022 06:40 PM    LYMPHSABS 1.82 09/19/2022 06:40 PM    EOSABS 0.05 09/19/2022 06:40 PM    BASOSABS 0.03 09/19/2022 06:40 PM    DIFFTYPE NOT REPORTED 04/07/2017 11:10 AM     BMP:    Lab Results   Component Value Date/Time     09/20/2022 04:31 AM    K 3.6 09/20/2022 04:31 AM     09/20/2022 04:31 AM    CO2 20 09/20/2022 04:31 AM    BUN 23 09/20/2022 04:31 AM    LABALBU 3.3 09/20/2022 04:31 AM    LABALBU 4.4 05/07/2012 10:45 AM    CREATININE 1.33 09/20/2022 04:31 AM    CALCIUM 7.9 09/20/2022 04:31 AM    GFRAA 51 09/20/2022 04:31 AM    LABGLOM 42 09/20/2022 04:31 AM    GLUCOSE 198 09/20/2022 04:31 AM    GLUCOSE 104 05/07/2012 10:45 AM       Radiology Review:   CT ABDOMEN PELVIS WO CONTRAST Additional Contrast? None    Result Date: 9/19/2022  1. Hepatic steatosis. 2. Solid lesion in the pancreatic head is indeterminate. Pancreatic protocol MRI or CT is recommended for further evaluation. 3. Mildly enlarged periportal lymph node. MRI ABDOMEN W WO CONTRAST MRCP    Result Date: 9/20/2022  1. A 2.6 cm lesion arising from the pancreatic uncinate process has features highly suggestive of a neuroendocrine neoplasm. Consider endoscopic biopsy and serum biochemical evaluation. 2. No findings of metastatic disease in the abdomen. 3. Severe hepatic steatosis.         ASSESSMENT:  Active Hospital Problems    Diagnosis Date Noted    Abdominal pain [R10.9] 09/20/2022     Priority: Medium    Acute pyelonephritis [N10] 09/19/2022     Priority: Medium    Diabetes mellitus type 2 in obese ([address] [E11.69, E66.9] 09/19/2022     Priority: Medium    Pancreatic mass [K86.89] 09/19/2022     Priority: Medium    Generalized abdominal pain [R10.84] 09/19/2022     Priority: Medium    E. coli UTI [N39.0, B96.20] 09/19/2022     Priority: Medium    Hyponatremia [E87.1] 09/19/2022     Priority: Medium    High anion gap metabolic acidosis [Y97.5] 09/19/2022     Priority: Medium    Renal insufficiency [N28.9] 09/19/2022     Priority: Medium       47 y.o. female with CHRISTINE, UTI, incidentally found 2.6 x 2.3 x 2.2 hyperenhancing mass in the uncinate process of the pancreas    Plan:  Continue medical mgmt and supportive care per primary  Follow up fasting gastrin level  Okay for diet from surgery perspective  Antibiotics for UTI  Follow-up pathology result including tumor grade, Ki-67, differentiation, and mitosis in the case of pNET  If it is confirmed as a pNET, will obtain a staging DOTATATE scan to rule out extrapancreatic disease as an outpatient  Asked cardiology for restratification for major pancreatic procedure  Outpatient follow up with Dr. Luis Gary    Electronically signed by Leonid Vital DO  on 9/22/2022 at 7:50 AM

## 2022-09-22 NOTE — PROCEDURES
[facility] [address] [address]                              CARDIAC STRESS TEST    PATIENT NAME: Rachid Everett                      :        1970  MED REC NO:   [id number]                             ROOM:       3711  ACCOUNT NO:   [de-identified]                           ADMIT DATE: 2022  PROVIDER:     Jani Pike    DATE OF STUDY:  2022    ORDERING PROVIDER:  Tremayne Lawson MD    PRIMARY CARE PROVIDER:  Stephanie Carballo MD    INTERPRETING PHYSICIAN:  Gordon Zuniga STRESS STUDY:    Indication:  Chest pain    Procedure was explained and consent was signed    Medications:  Lexiscan, 0.4 mg  Resting heart rate:  88 bpm  Resting blood pressure:  89/65 mm/Hg  Infusion heart rate:  108 bpm  Infusion blood pressure:  108/63 mm/Hg  Resting EKG:  Normal  Stress heart response:  Normal Response  Stress BP response:  Appropriate  Stress EKG's:  Normal  Chest discomfort:  None  Ischemic EKG changes:  None    IMPRESSION:  Electrocardiographically negative Lexiscan stress study. Radio-isotope  results to follow from the department of Nuclear Medicine.       Kalin Torres    D: 2022 15:31:12       T: 2022 15:32:20     CRIS/MAR  Job#: [id number]     Doc#: Unknown    CC:

## 2022-09-22 NOTE — PROGRESS NOTES
CLINICAL PHARMACY NOTE: MEDS TO BEDS    Total # of Prescriptions Filled: 2   The following medications were delivered to the patient:  Pantoprazole 40 mg tab  Ciprofloxacin 500 mg tab    Additional Documentation: Medications delivered to the patient in room 338 @ 2:24 pm .

## 2022-09-22 NOTE — PROGRESS NOTES
[facility]  Office: [address], , Author Dion, DO, Maricarmen Gardiner, DO, Radha Philip, DO, Shelly Spring MD, Austin Olson MD, Socorro Lr MD, Carolan Schilder, MD,  Maye Boyer MD, Mamadou Larios MD, Mimi Fitzgerald, DO, Judith Stratton MD,  Reese Khan MD, Mamta Almonte MD, Ab Ohara DO, Dileep George MD, Alvaro Scott MD, Jade Springer MD, Martha Sofia MD, Lyla Glover MD, Rosita Franz MD, Yobani Griffin DO, [place] MD, Rebecca Reyes MD, Tona Glover, [facility],  Callum Mays, CNP, Yana Delacruz, CNP, Anaya Tracey, CNP,  Mic Jay, [facility], Hubert Soliman, CNP, Abdulaziz Diaz, CNP, Juliet Ferguson, CNP, Chalino Belle, CNP, Teresa Giraldo, CNP, MARIO EstradaC, Armaan Montero, CNS, Lysbeth Angelucci, [facility], Melina Flowers, CNP, Caryle Frame, [facility], Vaughn Whalen, [address]    Progress Note    9/22/2022    7:47 AM    Name:   Gertrudis Talamantes  MRN:     [id number]     Acct:      [de-identified]   Room:   [address] [place] [address] Day:  1  Admit Date:  9/19/2022  5:23 PM    PCP:   Adelia Flores MD  Code Status:  Full Code    Subjective:     C/C:   Chief Complaint   Patient presents with    Flank Pain     Interval History Status: improved. About the same    Still has some right sided abd pain but better than it was    Brief History:     Per my partner:  \"Mrs. Elba Corley is a 47year old female with a significant past medical history of HTN and Type II DM who initially presented to the ED with the chief complaint of RUQ abdominal pain with radiation to right flank onset Friday 9/16/2022. Admitted to associated Fevers, Chills, Nausea and Vomiting 2-3x Friday. Abdominal pain worsened over the weekend. In the ED she was initially febrile 102.5 but otherwise hemodynamically stable. Labs reviewed hypokalemia.  Creatinine elevated but unknown baseline but patient did appear dry on admission. CT Abdomen and pelvis was done due to suspected Pyelonephritis. Demonstrated solid lesion in the pancreatic head. MRI w and wo contrast MRCP was done demonstrating a 2.6 cm lesion arising fromt he pancreatic uncinate process with features highly suggestive of neuroendocrine neoplasm. \"    Had EUS 9/20: pancreas mass biopsied  EGD 9/20: gastric polyp resected, gastritis biopsied    Review of Systems:     Constitutional:  negative for chills, fevers, sweats  Respiratory:  negative for cough, dyspnea on exertion, shortness of breath, wheezing  Cardiovascular:  negative for chest pain, chest pressure/discomfort, lower extremity edema, palpitations  Gastrointestinal:  negative for constipation, diarrhea, nausea, vomiting  Neurological:  negative for dizziness, headache    Medications: Allergies: Allergies   Allergen Reactions    Codeine     Percocet [Oxycodone-Acetaminophen]        Current Meds:   Scheduled Meds:    pantoprazole  40 mg Oral QAM AC    cefTRIAXone (ROCEPHIN) IV  1,000 mg IntraVENous Q24H    atorvastatin  20 mg Oral Daily    sodium chloride flush  5-40 mL IntraVENous 2 times per day    enoxaparin  30 mg SubCUTAneous BID    insulin lispro  0-4 Units SubCUTAneous TID WC    insulin lispro  0-4 Units SubCUTAneous Nightly     Continuous Infusions:    [MAR Hold] sodium chloride Stopped (09/20/22 1249)    dextrose      sodium chloride 75 mL/hr at 09/20/22 0004    sodium chloride       PRN Meds: [MAR Hold] sodium chloride flush, glucose, dextrose bolus **OR** dextrose bolus, glucagon (rDNA), dextrose, sodium chloride flush, sodium chloride, ondansetron **OR** ondansetron, acetaminophen **OR** acetaminophen    Data:     Past Medical History:   has a past medical history of Arthritis, Diabetes mellitus ([address] Hypertension, and Obese. Social History:   reports that she has quit smoking. She has never used smokeless tobacco. She reports that she does not drink alcohol and does not use drugs. Family History: History reviewed. No pertinent family history. Vitals:  /74   Pulse 91   Temp 98.4 °F (36.9 °C) (Oral)   Resp 18   Ht 4' 11\" (1.499 m)   Wt 223 lb (101.2 kg)   SpO2 98%   BMI 45.04 kg/m²   Temp (24hrs), Av.4 °F (36.9 °C), Min:98.4 °F (36.9 °C), Max:98.4 °F (36.9 °C)    Recent Labs     22   POCGLU 161* 182* 130* 170*       I/O (24Hr):   No intake or output data in the 24 hours ending 22 0747      Labs:  Hematology:  Recent Labs     22   WBC 9.2  --    RBC 3.80*  --    HGB 12.0  --    HCT 34.5*  --    MCV 90.8  --    MCH 31.6  --    MCHC 34.8  --    RDW 12.1  --      --    MPV 9.3  --    INR  --  1.0     Chemistry:  Recent Labs     22   * 136   K 3.7 3.6*   CL 97* 105   CO2 22 20   GLUCOSE 133* 198*   BUN 19 23*   CREATININE 1.36* 1.33*   MG  --  2.0   ANIONGAP 15 11   LABGLOM 41* 42*   GFRAA 49* 51*   CALCIUM 8.8 7.9*     Recent Labs     22  0040 22  0431 22  0822   PROT 7.9  --   --  6.7  --   --   --   --    LABALBU 4.1  --   --  3.3*  --   --   --   --    LABA1C  --   --  6.7*  --   --   --   --   --    AST 34*  --   --  26  --   --   --   --    ALT 38*  --   --  30  --   --   --   --    ALKPHOS 129*  --   --  106*  --   --   --   --    BILITOT 0.8  --   --  0.5  --   --   --   --    LIPASE 34  --   --   --   --   --   --   --    POCGLU  --  141*  --   --  161* 182* 130* 170*     ABG:No results found for: POCPH, PHART, PH, POCPCO2, NXA5SZO, PCO2, POCPO2, PO2ART, PO2, POCHCO3, JCN0LJB, HCO3, NBEA, PBEA, BEART, BE, THGBART, THB, EID3QDY, JJRJ6YRE, M8QBHDIE, O2SAT, FIO2    Lab Results   Component Value Date/Time    CULTURE ESCHERICHIA COLI >[id number] CFU/ML (A) 2022 06:53 PM       Radiology:  CT ABDOMEN PELVIS WO CONTRAST Additional Contrast? None    Result

## 2022-09-23 ENCOUNTER — TELEPHONE (OUTPATIENT)
Dept: SURGERY | Age: 52
End: 2022-09-23

## 2022-09-23 DIAGNOSIS — C7B.09: ICD-10-CM

## 2022-09-23 DIAGNOSIS — D3A.8 PRIMARY PANCREATIC NEUROENDOCRINE TUMOR: Primary | ICD-10-CM

## 2022-09-23 LAB
GASTRIN: 1374 PG/ML (ref 0–100)
SURGICAL PATHOLOGY REPORT: NORMAL

## 2022-09-23 NOTE — TELEPHONE ENCOUNTER
Called and spoke with central scheduling to schedule pt's PET scan correctly for pt. Due to pt having medicare we had to work on finding the correct dx code to be covered.    I called pt and gave her thorough instructions on her PET scan that is scheduled for 10/5/22 at [facility].

## 2022-09-27 ENCOUNTER — TELEPHONE (OUTPATIENT)
Dept: ONCOLOGY | Age: 52
End: 2022-09-27

## 2022-09-27 ENCOUNTER — TELEPHONE (OUTPATIENT)
Dept: SURGERY | Age: 52
End: 2022-09-27

## 2022-09-27 NOTE — TELEPHONE ENCOUNTER
Liliam Mensah, Dr. Dax Beltran office, alerted Johnathan Naranjo [facility] requesting oncology navigation & stated pt scheduled for consultation . Name: Charles Infante  : 1970  MRN: [id number]    Oncology Navigation- Initial Note:    Intake-  Contact Type: Telephone  Notes: Introductory phone call made to patient, instructed patient Johnathan Lema will be navigating care per Dr. Dax Beltran request & may call writer with any questions/concerns @ [phone] prn. Discussed potential barriers to care, pt denied any. Pt stated using medical insurance cab service for transportation. Pt confirmed  Dr. Rambo Lyon consultation & confirmed transportation arranged. Inquired on smoking hx, pt stated former smoker & quit 5-6 years ago. Pt stated smoked 1 pack per month. Inquired on alcohol/drug use. Pt stated hx drinking on w/e's & smoking marijuana. Pt stated quit 1 year ago. Pt denied questions/concerns. Instructed pt writer will follow closely & encouraged to contact writer prn. Will continue to follow.     Electronically signed by Tommie Mckinnon RN on 2022 at 9:07 AM

## 2022-09-29 ENCOUNTER — TELEPHONE (OUTPATIENT)
Dept: SURGERY | Age: 52
End: 2022-09-29

## 2022-09-29 RX ORDER — PANTOPRAZOLE SODIUM 40 MG/1
40 TABLET, DELAYED RELEASE ORAL
Qty: 180 TABLET | Refills: 3 | Status: SHIPPED | OUTPATIENT
Start: 2022-09-29 | End: 2022-10-18

## 2022-09-29 NOTE — TELEPHONE ENCOUNTER
Called and left a  for pt informing her that Dr. Victorina Cantor would like her to start taking the Protonix twice daily that was sent into her pharmacy for her high gastrin levels.

## 2022-10-05 ENCOUNTER — HOSPITAL ENCOUNTER (OUTPATIENT)
Dept: NUCLEAR MEDICINE | Age: 52
Discharge: HOME | End: 2022-10-07
Payer: COMMERCIAL

## 2022-10-05 DIAGNOSIS — C7B.09: ICD-10-CM

## 2022-10-05 PROCEDURE — 3430000000 HC RX DIAGNOSTIC RADIOPHARMACEUTICAL: Performed by: SURGERY

## 2022-10-05 PROCEDURE — 2580000003 HC RX 258: Performed by: SURGERY

## 2022-10-05 PROCEDURE — 78815 PET IMAGE W/CT SKULL-THIGH: CPT

## 2022-10-05 PROCEDURE — A9592 HC RX DIAGNOSTIC RADIOPHARMACEUTICAL: HCPCS

## 2022-10-05 PROCEDURE — 3430000000 HC RX DIAGNOSTIC RADIOPHARMACEUTICAL

## 2022-10-05 PROCEDURE — A9592 HC RX DIAGNOSTIC RADIOPHARMACEUTICAL: HCPCS | Performed by: SURGERY

## 2022-10-05 RX ORDER — SODIUM CHLORIDE 0.9 % (FLUSH) 0.9 %
10 SYRINGE (ML) INJECTION PRN
Status: DISCONTINUED | OUTPATIENT
Start: 2022-10-05 | End: 2022-10-08 | Stop reason: HOSPADM

## 2022-10-05 RX ADMIN — COPPER CU 64 DOTATATE 4.01 MILLICURIE: 1 INJECTION, SOLUTION INTRAVENOUS at 12:56

## 2022-10-05 RX ADMIN — SODIUM CHLORIDE, PRESERVATIVE FREE 10 ML: 5 INJECTION INTRAVENOUS at 12:56

## 2022-10-06 ENCOUNTER — TELEPHONE (OUTPATIENT)
Dept: ONCOLOGY | Age: 52
End: 2022-10-06

## 2022-10-06 NOTE — TELEPHONE ENCOUNTER
Name: Diogo Current  : 1970  MRN: [id number]    Oncology Navigation Follow-Up Note    Contact Type:  Telephone    Notes: Tootie Willoughby., Dr. Anay Myers office, alerted writer pt no show for today's consult. [address] stated pt contacted & rescheduled 10/13. Will continue to follow.     Electronically signed by Deirdre Mclain RN on 10/6/2022 at 11:31 AM

## 2022-10-17 NOTE — PROGRESS NOTES
[address] [facility]  [address] [facility] [address]  Dept: [phone]                 Clinic History and Physical    Patient:  Diogo Méndez  MRN: [id number]    CHIEF COMPLAINT:  had concerns including New Patient (Primary pancreatic neuroendocrine tumor). PCP: Bob Rose MD    HISTORY OF PRESENT ILLNESS:   The patient is a 65-year-old female with history of Diabetes, COPD, hypertension, morbid obesity BMI 45 and severe arthritis in lower extremity. She presented to emergency room on 9/20/2022 with right lower quadrant pain radiating from the back she was found to have CHRISTINE and pyelonephritis. Her urine culture was positive for E. coli. She was started on antibiotics. The CT scan that was obtained on admission was performed without IV contrast and was concerning for a 2.5 x 2.3 cm solid-appearing lesion in the pancreatic uncinate. This was followed by MRI which demonstrated a 2.6 x 2.2 x 2.3 hyperenhancing mass in the uncinate concerning for neuroendocrine tumor there was no biliary neither pancreatic duct dilatation. There were many peripancreatic lymph nodes and, with my review, I could recognize another nodule anterior to the pancreatic tail likely to represent a lymph node. Severe hepatic steatosis was also noted. Dr. 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 An 8 mm bleeding sessile polyp was snared and sent for pathology, the duodenum was normal.  EUS of the pancreas demonstrated hypoechoic, homogeneous and solid mass in the pancreatic uncinate measuring 25 x 20 mm. Biopsy demonstrated well diff pNET, Ki67 <3%, mitosis pending. Another FNA biopsy was sent from a nearby lymph node which was negative for pathology. She denies carcinoid syndrome symptoms. her performance status ECOG is 2/3. She use an electric chair for long distance mobility. She gets short of breath on exertion. She cannot walk more than 2 blocks without stopping due to shortness of breath. She has a family history of leukemia and disseminated cancer without certain primary. She quit smoking 6 years ago. She does not drink alcohol for a year now     Of note gastrin 1374 pg/ml. The gastric polyp came as hyperplastic. On exam she is obese. She has right lower quadrant tenderness. She has a history of tubal ligation and ventral hernia repair. Oncology History    No history exists. Past Medical History:        Diagnosis Date    Arthritis     Diabetes mellitus ([address]     Hypertension     Obese        Past Surgical History:        Procedure Laterality Date    HERNIA REPAIR      TUBAL LIGATION      UPPER GASTROINTESTINAL ENDOSCOPY N/A 9/20/2022    EGD W/EUS FNA performed by Jareth Gordon MD at [facility] [address]  9/20/2022    EGD BIOPSY performed by Jareth Gordon MD at [facility]       Medications Prior to Admission:    Prior to Admission medications    Medication Sig Start Date End Date Taking? Authorizing Provider   pantoprazole (PROTONIX) 40 MG tablet Take 1 tablet by mouth every morning (before breakfast) 9/23/22  Yes Dl Philip, DO   simvastatin (ZOCOR) 40 MG tablet Take 40 mg by mouth nightly   Yes Historical Provider, MD   acetaminophen (TYLENOL) 325 MG tablet Take 650 mg by mouth every 6 hours as needed for Pain   Yes Historical Provider, MD   ondansetron (ZOFRAN) 4 MG tablet Take 1 tablet by mouth every 8 hours as needed for Nausea 5/22/17  Yes Sujatha Herbert, DO   benzonatate (TESSALON) 100 MG capsule Take 1 capsule by mouth 3 times daily as needed for Cough 5/4/15  Yes Dennis Ngo, DO   LISINOPRIL PO Take 20 mg by mouth daily.    Yes Historical Provider, MD   meloxicam (MOBIC) 15 MG tablet Take 1 tablet by mouth daily 7/13/22 9/22/22  Yamileth Mccann, DO   docusate sodium (COLACE) 100 MG capsule Take 1 capsule by mouth 2 times daily  Patient not taking: Reported on 10/18/2022 11/3/17   Aroldo Matos APRN - CNP   lisinopril-hydrochlorothiazide (PRINZIDE;ZESTORETIC) 20-12.5 MG per tablet Take 1 tablet by mouth daily  9/22/22  Historical Provider, MD   METFORMIN HCL PO Take 1,000 mg by mouth 2 times daily. Patient not taking: Reported on 10/18/2022    Historical Provider, MD       Allergies:  Codeine and Percocet [oxycodone-acetaminophen]    Social History:   TOBACCO:   reports that she quit smoking about 5 years ago. Her smoking use included cigarettes. She has never used smokeless tobacco.  ETOH:   reports no history of alcohol use. Family History:   She has a family history of leukemia and disseminated cancer without certain primary. REVIEW OF SYSTEMS:  Review of Systems   Constitutional:  Negative for chills, fatigue, fever and unexpected weight change. Eyes:  Negative for visual disturbance. Respiratory:  Negative for cough, chest tightness, shortness of breath and wheezing. Cardiovascular:  Negative for chest pain, palpitations and leg swelling. Gastrointestinal:  Negative for abdominal distention, abdominal pain, blood in stool, constipation, diarrhea, nausea and vomiting. Genitourinary:  Negative for dysuria, hematuria and urgency. Musculoskeletal:  Positive for back pain. Skin:  Negative for rash. Neurological:  Negative for dizziness, seizures, syncope, speech difficulty, weakness, light-headedness, numbness and headaches. Hematological:  Negative for adenopathy. Does not bruise/bleed easily. Psychiatric/Behavioral:  The patient is not nervous/anxious.       Physical Exam:    Vitals: BP (!) 140/93 (Site: Left Upper Arm, Position: Sitting, Cuff Size: Large Adult)   Pulse 92   Ht 4' 11.5\" (1.511 m)   Wt 223 lb 12.8 oz (101.5 kg)   BMI 44.45 kg/m²   General appearance: alert, appears stated age and cooperative  Skin: Skin color, texture, turgor normal. No rashes or lesions  HEENT: Head: Normocephalic, no lesions, without obvious abnormality. Neck: no adenopathy, no carotid bruit, no JVD, supple, symmetrical, trachea midline, and thyroid not enlarged, symmetric, no tenderness/mass/nodules  Lungs: Breathing comfortably  Heart: RRR  Abdomen: Soft, obese  Extremities: extremities normal, atraumatic, no cyanosis or edema  Neurologic: Mental status: Alert, oriented, thought content appropriate    CBC:   Lab Results   Component Value Date    WBC 9.2 09/19/2022    HGB 12.0 09/19/2022    HCT 34.5 (L) 09/19/2022    MCV 90.8 09/19/2022     09/19/2022     BMP:    Lab Results   Component Value Date/Time     09/20/2022 04:31 AM    K 3.6 09/20/2022 04:31 AM     09/20/2022 04:31 AM    CO2 20 09/20/2022 04:31 AM    BUN 23 09/20/2022 04:31 AM    CREATININE 1.33 09/20/2022 04:31 AM    GLUCOSE 198 09/20/2022 04:31 AM    GLUCOSE 104 05/07/2012 10:45 AM    CALCIUM 7.9 09/20/2022 04:31 AM      Hepatic:   Lab Results   Component Value Date/Time    ALKPHOS 106 09/20/2022 04:31 AM    ALT 30 09/20/2022 04:31 AM    AST 26 09/20/2022 04:31 AM    PROT 6.7 09/20/2022 04:31 AM    BILITOT 0.5 09/20/2022 04:31 AM    LABALBU 3.3 09/20/2022 04:31 AM    LABALBU 4.4 05/07/2012 10:45 AM      Troponin:   Lab Results   Component Value Date    TROPONINI 0.02 12/03/2014      BNP: No results found for: BNP   INR:   Lab Results   Component Value Date    INR 1.0 09/20/2022    PROTIME 10.4 09/20/2022     Endoscopy   EGD 9/19/2022[de-identified]   Esophagus: normal.   Stomach: Stomach had mild to moderate gastric erythema in the antrum and possible GA VE. This was biopsied with cold biopsy forceps. There was a 8 mm sessile polyp just prior to the pylorus which had mild oozing of blood. This was snared with cold snare and clipped with 1 metal resolution clip with complete control of bleeding.   The rest of the stomach was otherwise normal.  Duodenum: normal     EUS 9/19/2022:  Pancreas: A round mass was identified in the pancreatic head-near the uncinate process. The mass was hypoechoic, homogeneous and solid. The mass measured 25 mm by 20 mm in maximal cross-sectional diameter. The endosonographic borders were well-defined. Fine needle biopsy was performed. Color Doppler imaging was utilized prior to needle puncture to confirm a lack of significant vascular structures within the needle path. 2 passes were made with the 22 gauge ultrasound biopsy needle using a transduodenal approach. A stylet was used. Estimated blood loss was minimal. Verification of patient identification for the specimen was done by the physician and nurse using the patient's name and medical record number. The rest of the pancreas was otherwise normal. Normal PD. No signs of chronic pancreatitis. CBD: Normal, no stones, sludge. CBD diameter:  3 mm. Ampulla: Normal.  Left lobe of liver: normal.   Lymphadenopathy: 1 hypoechoic lymph node was visualized in the peripancreatic area which measured 7 mm x 6 mm in diameter, round in shape with well-defined borders. Fine-needle biopsy of this lymph node was performed with 22-gauge shark core needle via transduodenal route. 2 passes were made. Imaging   DOTATATE Scan 10/5/2022  DOTATATE avid mass at the pancreatic head consistent with history of   carcinoid. CT Result (most recent):  CT ABDOMEN PELVIS WO IV CONTRAST 09/19/2022    Narrative  EXAMINATION:  CT OF THE ABDOMEN AND PELVIS WITHOUT CONTRAST 9/19/2022 6:07 pm    TECHNIQUE:  CT of the abdomen and pelvis was performed without the administration of  intravenous contrast. Multiplanar reformatted images are provided for review. Automated exposure control, iterative reconstruction, and/or weight based  adjustment of the mA/kV was utilized to reduce the radiation dose to as low  as reasonably achievable.     COMPARISON:  04/27/2016    HISTORY:  ORDERING SYSTEM PROVIDED HISTORY: ABDMONIAL PAIN  TECHNOLOGIST PROVIDED HISTORY:  ABDMONIAL PAIN    Decision Support Exception - unselect if not a suspected or confirmed  emergency medical condition->Emergency Medical Condition (MA)  Is the patient pregnant?->No    FINDINGS:  Lower Chest: Subsegmental atelectasis in the lung bases. Coronary artery  atherosclerosis. Organs: Liver is diffusely hypoattenuating with fatty sparing along the  gallbladder fossa. Within the limitations of a noncontrast examination, no  acute abnormality within the spleen, gallbladder, pancreas, or adrenal  glands. There is a 2.5 x 2.3 cm solid-appearing lesion in the pancreatic  head. No nephrolithiasis or hydronephrosis. GI/Bowel: Stomach is nondistended. The small bowel is nondilated. The  appendix is normal in caliber. The colon is nondilated. Pelvis: The bladder is nondistended. No vesicular stone. The uterus is  present. Peritoneum/Retroperitoneum: No ascites or pneumoperitoneum. Abdominal aorta  is normal in caliber. There is a retroaortic left renal vein. There are  shotty mesenteric and retroperitoneal lymph nodes. There is a mildly  enlarged periportal node. Bones/Soft Tissues: Degenerative changes of the lower lumbar spine. Impression  1. Hepatic steatosis. 2. Solid lesion in the pancreatic head is indeterminate. Pancreatic protocol  MRI or CT is recommended for further evaluation. 3. Mildly enlarged periportal lymph node. CT ABDOMEN PELVIS WO IV CONTRAST 04/27/2016    Narrative  FINAL REPORT    EXAM:  CT ABDOMEN PELVIS WO IV CONTRAST    HISTORY:  concern of appendicitis, history of hernia repair    TECHNIQUE:  Spiral multi slice axial acquisition was obtained through the abdomen and pelvis without intravenous or oral contrast administration. Axial, coronal and sagittal images were reconstructed    PRIORS:  04/04/2013. FINDINGS:  The lung bases are free of infiltrates. There is diffuse fatty infiltration in the liver. This is unchanged.     The spleen, pancreas, gallbladder, adrenal glands and stomach are unremarkable. Both kidneys are normal in size. There is no hydronephrosis. There are no renal calculi or renal masses seen. The ureters are not dilated. The bowel is not dilated. There is no bowel wall thickening or mesenteric edema seen. The appendix is visualized and appears normal in size. There is no retroperitoneal adenopathy. The aorta is normal in size. The uterus appears normal in size. The ovaries are not enlarged. The bladder and rectum are unremarkable. The osseous structures are all grossly maintained. Impression:  Fatty infiltration in the liver. No other acute abdominal or pelvic findings      Electronically Signed By: Solange Ocampo   on  04/27/2016  17:51       MRI Result (most recent):  MRI ABDOMEN W WO CONTRAST MRCP 09/20/2022    Narrative  EXAMINATION:  MRI OF THE ABDOMEN WITH AND WITHOUT CONTRAST AND MRCP    9/20/2022 8:10 am    TECHNIQUE:  Multiplanar multisequence MRI of the abdomen was performed with and without  the administration of intravenous contrast.    COMPARISON:  Abdomen and pelvis CTs dating to 04/04/2013    HISTORY:  ORDERING SYSTEM PROVIDED HISTORY: eval panc mass, abdominal pain, fever  TECHNOLOGIST PROVIDED HISTORY:  eval panc mass, abdominal pain, fever  What is the sedation requirement?->None  Reason for Exam: eval panc mass, abdominal pain, fever    FINDINGS:  Patient motion in some areas, partially limiting evaluation of those areas. LOWER CHEST:  Stewart dependent atelectasis in the bilateral lower lobes on  some sequences. Normal heart size. No pleural nor pericardial effusions. LIVER:  Mildly enlarged with the right hemiliver possibly accentuated by a  normal variant Riedel's lobe. Severe steatosis with no findings of cirrhosis. GALLBLADDER/BILE DUCTS:  Normal gallbladder. No intrahepatic nor  extrahepatic biliary dilation. No obvious ductal filling defects nor  strictures.     PANCREAS:  Typical duct configuration without dilation. No obvious ductal  filling defects nor strictures. Mildly atrophic parenchyma. 2.6 cm x 2.2 cm  x 2.3 cm T2 hyperintense, T1 intermediate lesion in the uncinate process with  diffuse hyperenhancement on all postcontrast sequences and no significant  diffusion restriction. SPLEEN:  Normal.    ADRENAL GLANDS:  Normal.    KIDNEYS:  Bilateral renal peripelvic cysts, more prominent on the left. GI/BOWEL:  Normal course and caliber of the stomach, small bowel, and colon  without obstruction. Normal appendix with retrocecal location. PELVIS:  Not included. PERITONEUM/RETROPERITONEUM:  Long-term stability of mildly enlarged  portohepatic and portocaval lymph nodes, likely reactive. Long-term  stability of nonenlarged to mildly enlarged mesenteric nodes with subtle  mesenteric stranding, also likely reactive though the appearance could be  seen with mesenteric panniculitis or other etiologies. No free  intraperitoneal fluid. VESSELS:  Typical arterial anatomy. Patent veins. SOFT TISSUES/BONES:  Laxity of the anterior and lateral abdominal wall  musculature with diastasis recti. No suspicious marrow lesions. Multilevel  degenerative disc disease. Impression  1. A 2.6 cm lesion arising from the pancreatic uncinate process has features  highly suggestive of a neuroendocrine neoplasm. Consider endoscopic biopsy  and serum biochemical evaluation. 2. No findings of metastatic disease in the abdomen. 3. Severe hepatic steatosis. Pathology     Surgical Pathology Report   Date Value Ref Range Status   09/20/2022       -- Diagnosis --  A. STOMACH, BIOPSIES:  - MILD TO MODERATE CHRONIC ACTIVE GASTRITIS.  - HELICOBACTER PYLORI IMMUNOSTAIN (CONTROL APPROPRIATE) IS NEGATIVE  FOR   INFECTIOUS BACTERIA. B.  STOMACH, POLYPECTOMY:  - BENIGN HYPERPLASTIC GASTRIC POLYP WITH MARKED ACUTE AND CHRONIC   INFLAMMATION AND FOCAL SURFACE EROSION/ULCERATION.   - NEGATIVE FOR HELICOBACTER PYLORI INFECTION. Jonah Diaz. Ketty Nuñez M.D.  **Electronically Signed Out**         sls/9/21/2022       Clinical Information  Pre-op Diagnosis:  GERD   Operative Findings:  GASTRIC BX; GASTRIC POLYP   Operation Performed:  EGD W/EUS FAN, EGD BIOPSY     Source of Specimen  A: GASTRIC BX  B: GASTRIC BX    Gross Description  A. \"FELIX YOUNGBLOOD, GASTRIC BX\" Three tan-white tissue fragments from  0.3 to 0.4 cm and are 0.7 x 0.3 x 0.2 cm in aggregate. Entirely 1cs. B. \"FELIX YOUNGBLOOD, GASTRIC POLYP\" 0.6 x 0.5 x 0.3 cm polypoid portion  of tan-red tissue, inked and bisected. Entirely 1cs. ep tm      Microscopic Description  A, B. Microscopic examination performed. SURGICAL PATHOLOGY CONSULTATION       Patient Name: Seble Wen: [id number]  Path Number: [id number]    [facility] [place], [address]  [phone]  Fax: [phone]        A. FINE NEEDLE ASPIRATION EUS KATHERINE PANCREATIC LYMPH NODE:           NEGATIVE FOR MALIGNANCY. B.          FINE NEEDLE ASPIRATION EUS PANCREATIC HEAD MASS:          CELLULAR FINDINGS HIGHLY SUSPICIOUS FOR WELL-DIFFERENTIATED   NEUROENDOCRINE TUMOR   A Ki-67 immunostain performed part B shows a proliferation rate of   less than 3%, compatible with a well-differentiated neuroendocrine   tumor, grade 1. Assessment and Plan   Primary pancreatic neuroendocrine tumor  This is a 44-year-old female with multiple comorbidities presenting with pyelonephritis and newly diagnosed pancreatic uncinate mass that was biopsied and showed well differentiated neuroendocrine tumor, Ki-67 was less than 3%. I had a long discussion with the patient about her diagnosis both when she was admitted to the hospital and today in the clinic. She underwent DOTATATE scan which demonstrated no extrapancreatic metastatic disease.   There is a small lesion anterior to the body of the pancreas that is avid and concerning. I have discussed that in general the treatment entails Whipple procedure, however, given her performance status, recent diagnosis of renal failure and pyelonephritis, limited mobility, obesity, COPD and shortness of breath on exertion, I am concerned that proceeding with aggressive surgery as Whipple procedure will have unfortunate outcomes in her situation. She is a high risk for surgery using the ACS NSQIP calculator. Although this is not always accurate for patients with malignancy and we tend to use ECOG PS in lieu, it gives a good insight about her morbidity and mortality from surgical intervention. We have discussed her case in tumor board as well as I discussed her case with respected colleagues and [facility] surgery specialty and the decision is to treat her disease medically using long-acting somatostatin agonist.  I will place a referral for her to see  for discussion of further treatment. I will see her back in the office in 3 months for repeat CT scan pancreas protocol. I have noted that her gastrin level is elevated, hence she should continue to be on Protonix twice daily. We will send a test for secretin stimulation gastrin level. Thank you for the opportunity to participate in the care of Dave Nobles. Dave Nobles has my contacts and was encouraged to call me with any questions about what we discussed today. Clinical Stage: cT2 N0 M0      Patient Active Problem List   Diagnosis Code    Acute pyelonephritis N10    Diabetes mellitus type 2 in obese (HCC) E11.69, E66.9    Pancreatic mass K86.89    Generalized abdominal pain R10.84    E. coli UTI N39.0, B96.20    Hyponatremia E87.1    High anion gap metabolic acidosis A02.09    Renal insufficiency N28.9    Abdominal pain R10.9    Primary pancreatic neuroendocrine tumor D3A.8    Neuroendocrine tumor D3A.8       Alexys Hugo was seen today for new patient.     Diagnoses and all orders for this visit:    Pancreatic mass  -     Guadelupe Dancer, MD, Hematology/Oncology, [employer]  -     CT ABDOMEN PELVIS W WO CONTRAST Additional Contrast? None; Future  -     Miscellaneous Sendout; Future    Primary pancreatic neuroendocrine tumor    Neuroendocrine tumor    E. coli UTI    Diabetes mellitus type 2 in obese (HCC)    Renal insufficiency       Kristina Mayo MD [facility]  Surgical Oncology/HPB Surgery  [facility] [address] [place] [facility]  [address]  [place] [address]  Office:  [phone]  Fax:  [phone]  10/18/22   10:44 AM     CC: Warren Barry MD

## 2022-10-18 ENCOUNTER — TELEPHONE (OUTPATIENT)
Dept: ONCOLOGY | Age: 52
End: 2022-10-18

## 2022-10-18 ENCOUNTER — OFFICE VISIT (OUTPATIENT)
Dept: SURGERY | Age: 52
End: 2022-10-18
Payer: MEDICARE

## 2022-10-18 VITALS
DIASTOLIC BLOOD PRESSURE: 93 MMHG | BODY MASS INDEX: 43.94 KG/M2 | HEIGHT: 60 IN | SYSTOLIC BLOOD PRESSURE: 140 MMHG | WEIGHT: 223.8 LBS | HEART RATE: 92 BPM

## 2022-10-18 DIAGNOSIS — N39.0 E. COLI UTI: ICD-10-CM

## 2022-10-18 DIAGNOSIS — E11.69 DIABETES MELLITUS TYPE 2 IN OBESE (HCC): ICD-10-CM

## 2022-10-18 DIAGNOSIS — N28.9 RENAL INSUFFICIENCY: ICD-10-CM

## 2022-10-18 DIAGNOSIS — D3A.8 PRIMARY PANCREATIC NEUROENDOCRINE TUMOR: ICD-10-CM

## 2022-10-18 DIAGNOSIS — K86.89 PANCREATIC MASS: Primary | ICD-10-CM

## 2022-10-18 DIAGNOSIS — E66.9 DIABETES MELLITUS TYPE 2 IN OBESE (HCC): ICD-10-CM

## 2022-10-18 DIAGNOSIS — B96.20 E. COLI UTI: ICD-10-CM

## 2022-10-18 DIAGNOSIS — D3A.8 NEUROENDOCRINE TUMOR: ICD-10-CM

## 2022-10-18 PROCEDURE — 3044F HG A1C LEVEL LT 7.0%: CPT | Performed by: SURGERY

## 2022-10-18 PROCEDURE — 99205 OFFICE O/P NEW HI 60 MIN: CPT | Performed by: SURGERY

## 2022-10-18 RX ORDER — PANTOPRAZOLE SODIUM 40 MG/1
40 TABLET, DELAYED RELEASE ORAL 2 TIMES DAILY
Qty: 60 TABLET | Refills: 12 | Status: SHIPPED | OUTPATIENT
Start: 2022-10-18

## 2022-10-18 ASSESSMENT — ENCOUNTER SYMPTOMS
ABDOMINAL PAIN: 0
BACK PAIN: 1
BLOOD IN STOOL: 0
CONSTIPATION: 0
SHORTNESS OF BREATH: 0
NAUSEA: 0
DIARRHEA: 0
VOMITING: 0
COUGH: 0
ABDOMINAL DISTENTION: 0
CHEST TIGHTNESS: 0
WHEEZING: 0

## 2022-10-18 NOTE — TELEPHONE ENCOUNTER
Name: Zion Garcia  : 1970  MRN: [id number]    Oncology Navigation Follow-Up Note    Contact Type:  Telephone    Notes: Pt @ /[place] specialty office for Dr. Blake Dominique consult. Accompanied Dr. Blake Dominique in exam room. Dr. Blake Dominique spoke with pt @ length r/t dx, PET/CT dotatate scan results, tx options, & referral to Dr. Belinda Campbell. Pt denied questions/concerns. Spoke with Geovani Lilly [facility] , updated on new referral & requested appt. Pt scheduled 10/21 @ 1300. Pt updated on appt details & verbalized understanding. [facility] written materials along with writer's business card given to pt. Instructed pt may contact writer prn. Will continue to follow.     Electronically signed by Chas Siddiqi RN on 10/18/2022 at 10:30 AM

## 2022-10-18 NOTE — LETTER
[facility]  [address] [address]  Phone: [phone]  Fax: [phone]           Alanna Burrows MD      October 19, 2022     Patient: Jaun King   MR Number: [id number]   YOB: 1970   Date of Visit: 10/18/2022       Dear Dr. Ankita Day ref. provider found: Thank you for referring Felicity Kiser to me for evaluation/treatment. Below are the relevant portions of my assessment and plan of care. If you have questions, please do not hesitate to call me. I look forward to following Brissa Dias along with you.     Sincerely,        Alanna Burrows MD    CC providers:    No Recipients

## 2022-10-21 ENCOUNTER — INITIAL CONSULT (OUTPATIENT)
Dept: ONCOLOGY | Age: 52
End: 2022-10-21
Payer: MEDICARE

## 2022-10-21 VITALS
TEMPERATURE: 98 F | OXYGEN SATURATION: 98 % | SYSTOLIC BLOOD PRESSURE: 130 MMHG | BODY MASS INDEX: 43.35 KG/M2 | WEIGHT: 220.8 LBS | RESPIRATION RATE: 18 BRPM | HEIGHT: 60 IN | HEART RATE: 94 BPM | DIASTOLIC BLOOD PRESSURE: 98 MMHG

## 2022-10-21 DIAGNOSIS — D3A.8 PRIMARY PANCREATIC NEUROENDOCRINE TUMOR: Primary | ICD-10-CM

## 2022-10-21 DIAGNOSIS — E16.4 ELEVATED GASTRIN LEVEL: ICD-10-CM

## 2022-10-21 DIAGNOSIS — K31.819 GAVE (GASTRIC ANTRAL VASCULAR ECTASIA): ICD-10-CM

## 2022-10-21 DIAGNOSIS — K76.0 HEPATIC STEATOSIS: ICD-10-CM

## 2022-10-21 PROCEDURE — G8417 CALC BMI ABV UP PARAM F/U: HCPCS | Performed by: INTERNAL MEDICINE

## 2022-10-21 PROCEDURE — 99205 OFFICE O/P NEW HI 60 MIN: CPT | Performed by: INTERNAL MEDICINE

## 2022-10-21 PROCEDURE — G8427 DOCREV CUR MEDS BY ELIG CLIN: HCPCS | Performed by: INTERNAL MEDICINE

## 2022-10-21 PROCEDURE — G8484 FLU IMMUNIZE NO ADMIN: HCPCS | Performed by: INTERNAL MEDICINE

## 2022-10-21 PROCEDURE — 99202 OFFICE O/P NEW SF 15 MIN: CPT | Performed by: INTERNAL MEDICINE

## 2022-10-21 RX ORDER — DIPHENHYDRAMINE HYDROCHLORIDE 50 MG/ML
50 INJECTION INTRAMUSCULAR; INTRAVENOUS
Status: CANCELLED | OUTPATIENT
Start: 2022-11-04

## 2022-10-21 RX ORDER — GLIPIZIDE 5 MG/1
5 TABLET ORAL DAILY
COMMUNITY
End: 2022-11-11 | Stop reason: SDUPTHER

## 2022-10-21 RX ORDER — LEVOTHYROXINE SODIUM 0.1 MG/1
100 TABLET ORAL DAILY
COMMUNITY

## 2022-10-21 RX ORDER — SODIUM CHLORIDE 9 MG/ML
INJECTION, SOLUTION INTRAVENOUS CONTINUOUS
Status: CANCELLED | OUTPATIENT
Start: 2022-11-04

## 2022-10-21 RX ORDER — FAMOTIDINE 10 MG/ML
20 INJECTION, SOLUTION INTRAVENOUS
Status: CANCELLED | OUTPATIENT
Start: 2022-11-04

## 2022-10-21 RX ORDER — ONDANSETRON 2 MG/ML
8 INJECTION INTRAMUSCULAR; INTRAVENOUS
Status: CANCELLED | OUTPATIENT
Start: 2022-11-04

## 2022-10-21 RX ORDER — LANREOTIDE ACETATE 120 MG/.5ML
120 INJECTION SUBCUTANEOUS ONCE
Status: CANCELLED | OUTPATIENT
Start: 2022-11-04 | End: 2022-10-21

## 2022-10-21 RX ORDER — ALBUTEROL SULFATE 90 UG/1
4 AEROSOL, METERED RESPIRATORY (INHALATION) PRN
Status: CANCELLED | OUTPATIENT
Start: 2022-11-04

## 2022-10-21 RX ORDER — EPINEPHRINE 1 MG/ML
0.3 INJECTION, SOLUTION, CONCENTRATE INTRAVENOUS PRN
Status: CANCELLED | OUTPATIENT
Start: 2022-11-04

## 2022-10-21 RX ORDER — ACETAMINOPHEN 325 MG/1
650 TABLET ORAL
Status: CANCELLED | OUTPATIENT
Start: 2022-11-04

## 2022-10-21 NOTE — PROGRESS NOTES
Srinivasan Hughes                                                                                                                  10/21/2022  MRN:   [id number]  YOB: 1970  PCP:                           Temitope Mejia MD  Referring Physician: Sue Olguin  Treating Physician Name: Miri Clifford MD      Reason for consultation:  Chief Complaint   Patient presents with    Follow-up    Consultation   Patient presents to the clinic to establish care and for further work-up and evaluation. Current problems:  Well-differentiated, grade 1, pancreatic neuroendocrine tumor, clinical stage T2 N0 M0  Elevated gastrin (patient on Protonix)  Multiple comorbidities including diabetes mellitus obesity, renal insufficiency and severe hepatic steatosis  Family history of leukemia     Active and recent treatments:  Patient medically unfit for surgery  Anticipating lanreotide    Summary of Case/History:    Srinivasan Hughes a 47 y. o.female is a patient who presents to the clinic to establish care and for further work-up and evaluation. Patient has multiple comorbidities including diabetes COPD hypertension morbid obesity. Patient initially presented to the emergency department with complaints of right lower quadrant pain radiating to the back. Work-up revealed acute kidney injury pyelonephritis. Patient CT scan without contrast also showed a possible solid-appearing lesion in the pancreatic uncinate process there was no biliary or pancreatic ductal dilatation noted. There were peripancreatic lymph nodes noted. There was also evidence of severe hepatic steatosis. Patient underwent EGD which showed erythematous changes in the cystic gastric antrum and possible gave. There was 8 mm bleeding C-cell polyp in the duodenum which was removed. EUS of the pancreas demonstrated hypoechoic solid mass in the pancreatic uncinate process measuring 2.5 x 2 cm.   Patient underwent biopsy which revealed well differentiated, grade 1 PNET with Ki-67 less than 3%. Lymph node biopsy was negative. On history patient denies any symptoms of carcinoid syndrome. Her performance status is not very good. She uses a wheelchair to get around. Patient underwent dotatate PET scan which showed hepatic steatosis solid lesion in the pancreatic head.     Past Medical History:   Past Medical History:   Diagnosis Date    Arthritis     Diabetes mellitus ([address]     Hypertension     Obese        Past Surgical History:     Past Surgical History:   Procedure Laterality Date    HERNIA REPAIR      TUBAL LIGATION      UPPER GASTROINTESTINAL ENDOSCOPY N/A 2022    EGD W/EUS FNA performed by Francesca Pryor MD at [facility]    UPPER GASTROINTESTINAL ENDOSCOPY  2022    EGD BIOPSY performed by Francesca Pryor MD at [facility]       Patient Family Social History:  History of leukemia    Social History     Socioeconomic History    Marital status: Single   Tobacco Use    Smoking status: Former     Types: Cigarettes     Quit date:      Years since quittin.8    Smokeless tobacco: Never    Tobacco comments:     Hx 1 pack per month quit 5+ years ago    Substance and Sexual Activity    Alcohol use: No     Comment: quit drinking on w/e's 1 year ago    Drug use: No     Comment: quit smoking marijuana 1 year ago       Current Medications:     Current Outpatient Medications   Medication Sig Dispense Refill    glipiZIDE (GLUCOTROL) 5 MG tablet Take 5 mg by mouth daily      levothyroxine (SYNTHROID) 100 MCG tablet Take 100 mcg by mouth Daily      pantoprazole (PROTONIX) 40 MG tablet Take 1 tablet by mouth in the morning and at bedtime 60 tablet 12    pantoprazole (PROTONIX) 40 MG tablet Take 1 tablet by mouth every morning (before breakfast) 30 tablet 3    docusate sodium (COLACE) 100 MG capsule Take 1 capsule by mouth 2 times daily 14 capsule 0    simvastatin (ZOCOR) 40 MG tablet Take 40 mg by mouth nightly      acetaminophen (TYLENOL) 325 MG tablet Take 650 clear to auscultation, no wheezes, rales or rhonchi, symmetric air entry  Heart - normal rate, regular rhythm, normal S1, S2, no murmurs  Abdomen - soft, nontender, nondistended, no masses or organomegaly  Neurological - alert, oriented, normal speech, no focal findings or movement disorder noted  Extremities - peripheral pulses normal, no pedal edema, no clubbing or cyanosis  Skin - normal coloration and turgor, no rashes, no suspicious skin lesions noted       DATA:    Results for orders placed or performed during the hospital encounter of 09/19/22   Culture, Urine    Specimen: Urine, clean catch   Result Value Ref Range    Specimen Description . CLEAN CATCH URINE     Culture ESCHERICHIA COLI >399445 CFU/ML (A)        Susceptibility    Escherichia coli - BACTERIAL SUSCEPTIBILITY PANEL PACHECO     ampicillin >=32 Resistant      aztreonam <=1 Sensitive      ceFAZolin* <=4 Sensitive       * Cefazolin sensitivity results can be used to predict the effectiveness of oral   cephalosporins (eg. Cephalexin) in uncomplicated Urinary Tract Infections due to E. coli, K.   pneumoniae, and P. mirabilis       cefTRIAXone <=1 Sensitive      ciprofloxacin <=0.25 Sensitive      Confirmatory Extended Spectrum Beta-Lactamase NEGATIVE Negative      gentamicin <=1 Sensitive      nitrofurantoin <=16 Sensitive      tobramycin <=1 Sensitive      trimethoprim-sulfamethoxazole <=20 Sensitive      piperacillin-tazobactam <=4 Sensitive    COVID-19, Rapid    Specimen: Nasopharyngeal Swab   Result Value Ref Range    Specimen Description . NASOPHARYNGEAL SWAB     SARS-CoV-2, Rapid Not Detected Not Detected   NM Cardiac Stress Test Nuclear Imaging   Result Value Ref Range    Left Ventricular Ejection Fraction 77     LVEF MODALITY Nuclear    Lipase   Result Value Ref Range    Lipase 34 13 - 60 U/L   CBC with Auto Differential   Result Value Ref Range    WBC 9.2 3.5 - 11.3 k/uL    RBC 3.80 (L) 3.95 - 5.11 m/uL    Hemoglobin 12.0 11.9 - 15.1 g/dL Hematocrit 34.5 (L) 36.3 - 47.1 %    MCV 90.8 82.6 - 102.9 fL    MCH 31.6 25.2 - 33.5 pg    MCHC 34.8 28.4 - 34.8 g/dL    RDW 12.1 11.8 - 14.4 %    Platelets 206 649 - 573 k/uL    MPV 9.3 8.1 - 13.5 fL    NRBC Automated 0.0 0.0 per 100 WBC    Seg Neutrophils 71 (H) 36 - 65 %    Lymphocytes 20 (L) 24 - 43 %    Monocytes 8 3 - 12 %    Eosinophils % 1 1 - 4 %    Basophils 0 0 - 2 %    Immature Granulocytes 0 0 %    Segs Absolute 6.51 1.50 - 8.10 k/uL    Absolute Lymph # 1.82 1.10 - 3.70 k/uL    Absolute Mono # 0.73 0.10 - 1.20 k/uL    Absolute Eos # 0.05 0.00 - 0.44 k/uL    Basophils Absolute 0.03 0.00 - 0.20 k/uL    Absolute Immature Granulocyte 0.03 0.00 - 0.30 k/uL   CMP   Result Value Ref Range    Glucose 133 (H) 70 - 99 mg/dL    BUN 19 6 - 20 mg/dL    Creatinine 1.36 (H) 0.50 - 0.90 mg/dL    Calcium 8.8 8.6 - 10.4 mg/dL    Sodium 134 (L) 135 - 144 mmol/L    Potassium 3.7 3.7 - 5.3 mmol/L    Chloride 97 (L) 98 - 107 mmol/L    CO2 22 20 - 31 mmol/L    Anion Gap 15 9 - 17 mmol/L    Alkaline Phosphatase 129 (H) 35 - 104 U/L    ALT 38 (H) 5 - 33 U/L    AST 34 (H) <32 U/L    Total Bilirubin 0.8 0.3 - 1.2 mg/dL    Total Protein 7.9 6.4 - 8.3 g/dL    Albumin 4.1 3.5 - 5.2 g/dL    Albumin/Globulin Ratio 1.1 1.0 - 2.5    GFR Non- 41 (L) >60 mL/min    GFR  49 (L) >60 mL/min    GFR Comment         Urinalysis   Result Value Ref Range    Color, UA Dark Yellow (A) Yellow    Turbidity UA Cloudy (A) Clear    Glucose, Ur NEGATIVE NEGATIVE    Bilirubin Urine NEGATIVE NEGATIVE    Ketones, Urine TRACE (A) NEGATIVE    Specific Gravity, UA 1.020 1.005 - 1.030    Urine Hgb TRACE (A) NEGATIVE    pH, UA 5.5 5.0 - 8.0    Protein, UA 1+ (A) NEGATIVE    Urobilinogen, Urine Normal Normal    Nitrite, Urine POSITIVE (A) NEGATIVE    Leukocyte Esterase, Urine MODERATE (A) NEGATIVE   Microscopic Urinalysis   Result Value Ref Range    WBC, UA TOO NUMEROUS TO COUNT 0 - 5 /HPF    RBC, UA 2 TO 5 0 - 4 /HPF    Casts UA  0 - 8 /LPF     5 TO 10 HYALINE Reference range defined for non-centrifuged specimen. Epithelial Cells UA 0 TO 2 0 - 5 /HPF    Bacteria, UA MANY (A) None   Hemoglobin A1c   Result Value Ref Range    Hemoglobin A1C 6.7 (H) 4.0 - 6.0 %    Estimated Avg Glucose 146 mg/dL   Comprehensive Metabolic Panel w/ Reflex to MG   Result Value Ref Range    Glucose 198 (H) 70 - 99 mg/dL    BUN 23 (H) 6 - 20 mg/dL    Creatinine 1.33 (H) 0.50 - 0.90 mg/dL    Calcium 7.9 (L) 8.6 - 10.4 mg/dL    Sodium 136 135 - 144 mmol/L    Potassium 3.6 (L) 3.7 - 5.3 mmol/L    Chloride 105 98 - 107 mmol/L    CO2 20 20 - 31 mmol/L    Anion Gap 11 9 - 17 mmol/L    Alkaline Phosphatase 106 (H) 35 - 104 U/L    ALT 30 5 - 33 U/L    AST 26 <32 U/L    Total Bilirubin 0.5 0.3 - 1.2 mg/dL    Total Protein 6.7 6.4 - 8.3 g/dL    Albumin 3.3 (L) 3.5 - 5.2 g/dL    Albumin/Globulin Ratio 1.0 1.0 - 2.5    GFR Non-African American 42 (L) >60 mL/min    GFR  51 (L) >60 mL/min    GFR Comment         Magnesium   Result Value Ref Range    Magnesium 2.0 1.6 - 2.6 mg/dL   Protime-INR   Result Value Ref Range    Protime 10.4 9.1 - 12.3 sec    INR 1.0    Sodium, urine, random   Result Value Ref Range    Sodium,Ur 20 mmol/L   Protein, urine, random   Result Value Ref Range    Total Protein, Urine 45 mg/dL   Cytology, Non-Gyn   Result Value Ref Range    Case Number: [id number]    SURGICAL PATHOLOGY REPORT   Result Value Ref Range    Surgical Pathology Report       [id number]  [address] [place] [address]. [place], [address]  [phone]  Fax: [phone]    [address]     Patient Name: Ki Copeland: [id number]  Path Number: [id number]  Collected: 9/20/2022  Received: 9/20/2022  Reported: 9/22/2022 09:24    -- Diagnosis --  A. FINE NEEDLE ASPIRATION EUS KATHERINE PANCREATIC LYMPH NODE:          NEGATIVE FOR MALIGNANCY.            B. FINE NEEDLE ASPIRATION EUS PANCREATIC HEAD MASS:         CELLULAR FINDINGS HIGHLY SUSPICIOUS FOR WELL-DIFFERENTIATED  NEUROENDOCRINE TUMOR                 Domenica Zuluaga. Fred Licona,  **Electronically Signed Out**         ljf/9/22/2022  Procedures/Addenda  ADDENDUM AFTER SPECIAL STAINS     Date Ordered:     10/17/2022      Status: Signed Out       Date Complete:     10/17/2022     By: Domenica Zuluaga. Fred Licona D.O. Date Reported:     10/17/2022       INTERPRETATION  A Ki-67 immunostain perfor med part B shows a proliferation rate of  less than 3%, compatible with a well-differentiated neuroendocrine  tumor, grade 1. Domenica Licona D.O. Clinical Information  No information given. Source of Specimen  A: FINE NEEDLE ASPIRATION EUS LISA PANCREATIC LYMPH NODE  B: FINE NEEDLE ASPIRATION EUS PANCREATIC HEAD MASS    Gross Description  A. \"LISA PANCREATIC LYMPH NODE\" Specimen received in CytoLyt  solution, light red fluid with small white clots, 1 DQ slide prepared. B. \"PANCREATIC HEAD MASS\" Specimen received in CytoLyt solution,  red cloudy fluid with small clots, 1 DQ slide prepared. IMMEDIATE EVALUATION:    Evaluation episode: A: Lisa pancreatic lymph node:   BABS: Hypocellular specimen, negative for malignancy. Evaluation episode: B: Pancreatic head mass:  BABS: Blood only. L Ezra BRANDT. MICROSCOPIC DESCRIPTION   A..Direct, smears, a ThinPrep slide and cell block sediment sections  are examined. The specimen is hypocellula r and demonstrates rare  aggregates of lymphocytes. Immunostains for pankeratin and  chromogranin and synaptophysin are essentially negative with  appropriate reactive controls. A neoplasm is not identified. B.  Direct smears, a ThinPrep slide and cell block sediment sections  are examined. Fragments of benign pancreatic tissue are noted.    Aggregates of small round blue cells are noted that stain positive  with pancytokeratin, synaptophysin and chromogranin (controls  adequate) are also present. 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 Part B of this case was seen in intradepartmental consultation (RAYNA WARREN,  RD) for  purposes. SURGICAL PATHOLOGY REPORT   Result Value Ref Range    Surgical Pathology Report       -- Diagnosis --  A. STOMACH, BIOPSIES:  - MILD TO MODERATE CHRONIC ACTIVE GASTRITIS.  - HELICOBACTER PYLORI IMMUNOSTAIN (CONTROL APPROPRIATE) IS NEGATIVE  FOR   INFECTIOUS BACTERIA. B.  STOMACH, POLYPECTOMY:  - BENIGN HYPERPLASTIC GASTRIC POLYP WITH MARKED ACUTE AND CHRONIC   INFLAMMATION AND FOCAL SURFACE EROSION/ULCERATION. - NEGATIVE FOR HELICOBACTER PYLORI INFECTION. Ryan Zaragoza. Selma Méndez M.D.  **Electronically Signed Out**         sls/9/21/2022       Clinical Information  Pre-op Diagnosis:  GERD   Operative Findings:  GASTRIC BX; GASTRIC POLYP   Operation Performed:  EGD W/EUS FAN, EGD BIOPSY     Source of Specimen  A: GASTRIC BX  B: GASTRIC BX    Gross Description  A. \"FELIX YOUNGBLOOD, GASTRIC BX\" Three tan-white tissue fragments from  0.3 to 0.4 cm and are 0.7 x 0.3 x 0.2 cm in aggregate. Entirely 1cs. B. \"FELIX YOUNGBLOOD, GASTRIC POLYP\" 0.6 x 0.5 x 0.3 cm polypoid portion  of tan-red tissue, inked and bisected. Entirely 1cs. ep tm      Microscopic Description  A, B. Mi croscopic examination performed. SURGICAL PATHOLOGY CONSULTATION       Patient Name: Tamanna Gonsalves: [id number]  Path Number: [id number]    [facility] [address]   [place], [address]  [phone]  Fax: [phone]     GASTRIN   Result Value Ref Range    Gastrin 1374 (H) 0 - 100 pg/mL   Prealbumin   Result Value Ref Range    Prealbumin 7.5 (L) 20 - 40 mg/dL   HCG Qualitative, Serum   Result Value Ref Range    hCG Qual NEGATIVE NEGATIVE   POC Glucose Fingerstick   Result Value Ref Range    POC Glucose 141 (H) 65 - 105 mg/dL   POC Glucose Fingerstick   Result Value Ref Range    POC Glucose 161 (H) 65 - 105 mg/dL   POC Glucose Fingerstick   Result Value Ref Range    POC Glucose 182 (H) 65 - 105 mg/dL   POC Glucose Fingerstick   Result Value Ref Range    POC Glucose 130 (H) 65 - 105 mg/dL   POC Glucose Fingerstick   Result Value Ref Range    POC Glucose 170 (H) 65 - 105 mg/dL   POC Glucose Fingerstick   Result Value Ref Range    POC Glucose 116 (H) 65 - 105 mg/dL   POC Glucose Fingerstick   Result Value Ref Range    POC Glucose 152 (H) 65 - 105 mg/dL   EKG 12 Lead   Result Value Ref Range    Ventricular Rate 78 BPM    Atrial Rate 78 BPM    P-R Interval 172 ms    QRS Duration 84 ms    Q-T Interval 386 ms    QTc Calculation (Bazett) 440 ms    P Axis 31 degrees   ECHO Complete 2D W Doppler W Color   Result Value Ref Range    Left Ventricular Ejection Fraction 50     LVEF MODALITY ECHO      DOTATATE Scan 10/5/2022  DOTATATE avid mass at the pancreatic head consistent with history of   carcinoid. CT Result (most recent):  CT ABDOMEN PELVIS WO IV CONTRAST 09/19/2022     Narrative  EXAMINATION:  CT OF THE ABDOMEN AND PELVIS WITHOUT CONTRAST 9/19/2022 6:07 pm     TECHNIQUE:  CT of the abdomen and pelvis was performed without the administration of  intravenous contrast. Multiplanar reformatted images are provided for review. Automated exposure control, iterative reconstruction, and/or weight based  adjustment of the mA/kV was utilized to reduce the radiation dose to as low  as reasonably achievable. COMPARISON:  04/27/2016     HISTORY:  ORDERING SYSTEM PROVIDED HISTORY: ABDMONIAL PAIN  TECHNOLOGIST PROVIDED HISTORY:  ABDMONIAL PAIN     Decision Support Exception - unselect if not a suspected or confirmed  emergency medical condition->Emergency Medical Condition (MA)  Is the patient pregnant?->No     FINDINGS:  Lower Chest: Subsegmental atelectasis in the lung bases. Coronary artery  atherosclerosis.      Organs: Liver is The appendix is visualized and appears normal in size. There is no retroperitoneal adenopathy. The aorta is normal in size. The uterus appears normal in size. The ovaries are not enlarged. The bladder and rectum are unremarkable. The osseous structures are all grossly maintained. Impression:  Fatty infiltration in the liver. No other acute abdominal or pelvic findings        Electronically Signed By: Anastacio Beard   on  04/27/2016  17:51        MRI Result (most recent):  MRI ABDOMEN W WO CONTRAST MRCP 09/20/2022     Narrative  EXAMINATION:  MRI OF THE ABDOMEN WITH AND WITHOUT CONTRAST AND MRCP     9/20/2022 8:10 am     TECHNIQUE:  Multiplanar multisequence MRI of the abdomen was performed with and without  the administration of intravenous contrast.     COMPARISON:  Abdomen and pelvis CTs dating to 04/04/2013     HISTORY:  ORDERING SYSTEM PROVIDED HISTORY: eval panc mass, abdominal pain, fever  TECHNOLOGIST PROVIDED HISTORY:  eval panc mass, abdominal pain, fever  What is the sedation requirement?->None  Reason for Exam: eval panc mass, abdominal pain, fever     FINDINGS:  Patient motion in some areas, partially limiting evaluation of those areas. LOWER CHEST:  [place] dependent atelectasis in the bilateral lower lobes on  some sequences. Normal heart size. No pleural nor pericardial effusions. LIVER:  Mildly enlarged with the right hemiliver possibly accentuated by a  normal variant Riedel's lobe. Severe steatosis with no findings of cirrhosis. GALLBLADDER/BILE DUCTS:  Normal gallbladder. No intrahepatic nor  extrahepatic biliary dilation. No obvious ductal filling defects nor  strictures. PANCREAS:  Typical duct configuration without dilation. No obvious ductal  filling defects nor strictures. Mildly atrophic parenchyma.   2.6 cm x 2.2 cm  x 2.3 cm T2 hyperintense, T1 intermediate lesion in the uncinate process with  diffuse hyperenhancement on all postcontrast sequences and no significant  diffusion restriction. SPLEEN:  Normal.     ADRENAL GLANDS:  Normal.     KIDNEYS:  Bilateral renal peripelvic cysts, more prominent on the left. GI/BOWEL:  Normal course and caliber of the stomach, small bowel, and colon  without obstruction. Normal appendix with retrocecal location. PELVIS:  Not included. PERITONEUM/RETROPERITONEUM:  Long-term stability of mildly enlarged  portohepatic and portocaval lymph nodes, likely reactive. Long-term  stability of nonenlarged to mildly enlarged mesenteric nodes with subtle  mesenteric stranding, also likely reactive though the appearance could be  seen with mesenteric panniculitis or other etiologies. No free  intraperitoneal fluid. VESSELS:  Typical arterial anatomy. Patent veins. SOFT TISSUES/BONES:  Laxity of the anterior and lateral abdominal wall  musculature with diastasis recti. No suspicious marrow lesions. Multilevel  degenerative disc disease. Impression  1. A 2.6 cm lesion arising from the pancreatic uncinate process has features  highly suggestive of a neuroendocrine neoplasm. Consider endoscopic biopsy  and serum biochemical evaluation. 2. No findings of metastatic disease in the abdomen. 3. Severe hepatic steatosis. EGD 9/19/2022[de-identified]   Esophagus: normal.   Stomach: Stomach had mild to moderate gastric erythema in the antrum and possible GA VE. This was biopsied with cold biopsy forceps. There was a 8 mm sessile polyp just prior to the pylorus which had mild oozing of blood. This was snared with cold snare and clipped with 1 metal resolution clip with complete control of bleeding. The rest of the stomach was otherwise normal.  Duodenum: normal     EUS 9/19/2022:  Pancreas: A round mass was identified in the pancreatic head-near the uncinate process. The mass was hypoechoic, homogeneous and solid. The mass measured 25 mm by 20 mm in maximal cross-sectional diameter.  The endosonographic borders were well-defined. Fine needle biopsy was performed. Color Doppler imaging was utilized prior to needle puncture to confirm a lack of significant vascular structures within the needle path. 2 passes were made with the 22 gauge ultrasound biopsy needle using a transduodenal approach. A stylet was used. Estimated blood loss was minimal. Verification of patient identification for the specimen was done by the physician and nurse using the patient's name and medical record number. The rest of the pancreas was otherwise normal. Normal PD. No signs of chronic pancreatitis. CBD: Normal, no stones, sludge. CBD diameter:  3 mm. Ampulla: Normal.  Left lobe of liver: normal.   Lymphadenopathy: 1 hypoechoic lymph node was visualized in the peripancreatic area which measured 7 mm x 6 mm in diameter, round in shape with well-defined borders. Fine-needle biopsy of this lymph node was performed with 22-gauge shark core needle via transduodenal route. 2 passes were made. Impression:  Well-differentiated, grade 1, pancreatic neuroendocrine tumor, clinical stage T2 N0 M0  Elevated gastrin (patient on Protonix)  Multiple comorbidities including diabetes mellitus obesity, renal insufficiency and severe hepatic steatosis  Family history of leukemia  Gave    Plan:  I had a detailed discussion with the patient and personally went over results of lab work-up imaging studies and other relevant clinical data. Reviewed hospitalization record. Reviewed results of endoscopy, imaging studies as well as path report. I explained all the findings and the significance in layman terms. Discussed recommendations from tumor board. Reviewed NCCN guidelines for staging and treatment. Ideally a PNET which is localized would have been surgically excised which in patient's case means a Whipple procedure.   Unfortunately with all her comorbidities patient is thought to be very high risk for surgery and therefore at this point not a good surgical candidate. We discussed option of treating patient with Sandostatin analogs, lanreotide. I explained to the patient that treatment will not be curative instead goal of treatment would be to control disease prevent progression of the disease and control symptoms and hopefully prolong life. I also reviewed potential side effects of lanreotide. I also discussed option of active surveillance with the potential risk of disease progression and metastasis leading to carcinoid syndrome. Patient expressed understanding and wishes to proceed with the treatment. Patient also noted to have significantly elevated gastrin however patient is on a PPI. Case discussed over the phone with Dr. Alida Sorto  Case discussed with GI cancer navigator  NCCN guidelines were reviewed and discussed with the patient. The diagnosis and care plan were discussed with the patient in detail. I discussed the natural history of the disease, prognosis, risks and goals of therapy and answered all the patients questions to the best of my ability. Patient expressed understanding and was in agreement. Oscar Maddox MD          This note is created with the assistance of a speech recognition program.  While intending to generate a document that actually reflects the content of the visit, the document can still have some errors including those of syntax and sound a like substitutions which may escape proof reading. It such instances, actual meaning can be extrapolated by contextual diversion.

## 2022-10-24 ENCOUNTER — TELEPHONE (OUTPATIENT)
Dept: ONCOLOGY | Age: 52
End: 2022-10-24

## 2022-10-24 NOTE — TELEPHONE ENCOUNTER
AVS from 10/21/22      Tx orders placed   Rv in 2 weeks with tx c 1 d 1   Labs on rv       AVS given to  to follow precert    Pt was given AVS and appointment schedule    Electronically signed by Deng Colunga on 10/24/2022 at 7:59 AM Cane

## 2022-11-11 ENCOUNTER — TELEPHONE (OUTPATIENT)
Dept: ONCOLOGY | Age: 52
End: 2022-11-11

## 2022-11-11 ENCOUNTER — OFFICE VISIT (OUTPATIENT)
Dept: ONCOLOGY | Age: 52
End: 2022-11-11
Payer: MEDICARE

## 2022-11-11 ENCOUNTER — HOSPITAL ENCOUNTER (OUTPATIENT)
Dept: INFUSION THERAPY | Age: 52
Discharge: HOME | End: 2022-11-11
Payer: MEDICARE

## 2022-11-11 VITALS
SYSTOLIC BLOOD PRESSURE: 142 MMHG | DIASTOLIC BLOOD PRESSURE: 98 MMHG | WEIGHT: 220.8 LBS | TEMPERATURE: 97.4 F | BODY MASS INDEX: 43.85 KG/M2 | HEART RATE: 92 BPM

## 2022-11-11 DIAGNOSIS — D3A.8 PRIMARY PANCREATIC NEUROENDOCRINE TUMOR: Primary | ICD-10-CM

## 2022-11-11 DIAGNOSIS — D3A.8 NEUROENDOCRINE TUMOR: ICD-10-CM

## 2022-11-11 DIAGNOSIS — K76.0 HEPATIC STEATOSIS: ICD-10-CM

## 2022-11-11 DIAGNOSIS — K31.819 GAVE (GASTRIC ANTRAL VASCULAR ECTASIA): ICD-10-CM

## 2022-11-11 LAB
ABSOLUTE EOS #: 0.22 K/UL (ref 0–0.4)
ABSOLUTE LYMPH #: 1.54 K/UL (ref 1–4.8)
ABSOLUTE MONO #: 0.36 K/UL (ref 0.1–1.2)
ALBUMIN SERPL-MCNC: 4.4 G/DL (ref 3.5–5.2)
ALBUMIN/GLOBULIN RATIO: 1.3 (ref 1–2.5)
ALP BLD-CCNC: 128 U/L (ref 35–104)
ALT SERPL-CCNC: 48 U/L (ref 5–33)
ANION GAP SERPL CALCULATED.3IONS-SCNC: 11 MMOL/L (ref 9–17)
AST SERPL-CCNC: 41 U/L
BASOPHILS # BLD: 1 % (ref 0–2)
BASOPHILS ABSOLUTE: 0.05 K/UL (ref 0–0.2)
BILIRUB SERPL-MCNC: 0.3 MG/DL (ref 0.3–1.2)
BUN BLDV-MCNC: 15 MG/DL (ref 6–20)
CALCIUM SERPL-MCNC: 9.5 MG/DL (ref 8.6–10.4)
CHLORIDE BLD-SCNC: 99 MMOL/L (ref 98–107)
CO2: 24 MMOL/L (ref 20–31)
CREAT SERPL-MCNC: 1.02 MG/DL (ref 0.5–0.9)
EOSINOPHILS RELATIVE PERCENT: 4 % (ref 1–4)
GFR SERPL CREATININE-BSD FRML MDRD: >60 ML/MIN/1.73M2
GLUCOSE BLD-MCNC: 304 MG/DL (ref 70–99)
HCT VFR BLD CALC: 39.2 % (ref 36–46)
HEMOGLOBIN: 12.7 G/DL (ref 12–16)
LYMPHOCYTES # BLD: 28 % (ref 24–44)
MCH RBC QN AUTO: 30.2 PG (ref 26–34)
MCHC RBC AUTO-ENTMCNC: 32.4 G/DL (ref 31–37)
MCV RBC AUTO: 93.1 FL (ref 80–100)
MONOCYTES # BLD: 7 % (ref 2–11)
PDW BLD-RTO: 12.8 % (ref 12.5–15.4)
PLATELET # BLD: 267 K/UL (ref 140–450)
PMV BLD AUTO: 9.2 FL (ref 8–14)
POTASSIUM SERPL-SCNC: 4.6 MMOL/L (ref 3.7–5.3)
RBC # BLD: 4.21 M/UL (ref 4–5.2)
SEG NEUTROPHILS: 60 % (ref 36–66)
SEGMENTED NEUTROPHILS ABSOLUTE COUNT: 3.31 K/UL (ref 1.8–7.7)
SODIUM BLD-SCNC: 134 MMOL/L (ref 135–144)
TOTAL PROTEIN: 7.9 G/DL (ref 6.4–8.3)
WBC # BLD: 5.5 K/UL (ref 3.5–11)

## 2022-11-11 PROCEDURE — 80053 COMPREHEN METABOLIC PANEL: CPT

## 2022-11-11 PROCEDURE — 6370000000 HC RX 637 (ALT 250 FOR IP): Performed by: INTERNAL MEDICINE

## 2022-11-11 PROCEDURE — 6360000002 HC RX W HCPCS: Performed by: INTERNAL MEDICINE

## 2022-11-11 PROCEDURE — 85025 COMPLETE CBC W/AUTO DIFF WBC: CPT

## 2022-11-11 PROCEDURE — G8427 DOCREV CUR MEDS BY ELIG CLIN: HCPCS | Performed by: INTERNAL MEDICINE

## 2022-11-11 PROCEDURE — G8484 FLU IMMUNIZE NO ADMIN: HCPCS | Performed by: INTERNAL MEDICINE

## 2022-11-11 PROCEDURE — 99214 OFFICE O/P EST MOD 30 MIN: CPT | Performed by: INTERNAL MEDICINE

## 2022-11-11 PROCEDURE — 96372 THER/PROPH/DIAG INJ SC/IM: CPT

## 2022-11-11 PROCEDURE — 36415 COLL VENOUS BLD VENIPUNCTURE: CPT

## 2022-11-11 PROCEDURE — G8417 CALC BMI ABV UP PARAM F/U: HCPCS | Performed by: INTERNAL MEDICINE

## 2022-11-11 PROCEDURE — 1036F TOBACCO NON-USER: CPT | Performed by: INTERNAL MEDICINE

## 2022-11-11 PROCEDURE — 3017F COLORECTAL CA SCREEN DOC REV: CPT | Performed by: INTERNAL MEDICINE

## 2022-11-11 PROCEDURE — 99211 OFF/OP EST MAY X REQ PHY/QHP: CPT | Performed by: INTERNAL MEDICINE

## 2022-11-11 RX ORDER — LANREOTIDE ACETATE 120 MG/.5ML
120 INJECTION SUBCUTANEOUS ONCE
Status: COMPLETED | OUTPATIENT
Start: 2022-11-11 | End: 2022-11-11

## 2022-11-11 RX ORDER — GLIPIZIDE 5 MG/1
5 TABLET ORAL DAILY
Qty: 30 TABLET | Refills: 0 | Status: SHIPPED | OUTPATIENT
Start: 2022-11-11

## 2022-11-11 RX ORDER — SODIUM CHLORIDE 9 MG/ML
INJECTION, SOLUTION INTRAVENOUS CONTINUOUS
OUTPATIENT
Start: 2022-12-09

## 2022-11-11 RX ORDER — ALBUTEROL SULFATE 90 UG/1
4 AEROSOL, METERED RESPIRATORY (INHALATION) PRN
OUTPATIENT
Start: 2022-12-09

## 2022-11-11 RX ORDER — EPINEPHRINE 1 MG/ML
0.3 INJECTION, SOLUTION, CONCENTRATE INTRAVENOUS PRN
OUTPATIENT
Start: 2022-12-09

## 2022-11-11 RX ORDER — LANREOTIDE ACETATE 120 MG/.5ML
120 INJECTION SUBCUTANEOUS ONCE
OUTPATIENT
Start: 2022-12-09 | End: 2022-12-09

## 2022-11-11 RX ORDER — DIPHENHYDRAMINE HYDROCHLORIDE 50 MG/ML
50 INJECTION INTRAMUSCULAR; INTRAVENOUS
OUTPATIENT
Start: 2022-12-09

## 2022-11-11 RX ORDER — ONDANSETRON 2 MG/ML
8 INJECTION INTRAMUSCULAR; INTRAVENOUS
OUTPATIENT
Start: 2022-12-09

## 2022-11-11 RX ORDER — ACETAMINOPHEN 325 MG/1
650 TABLET ORAL
OUTPATIENT
Start: 2022-12-09

## 2022-11-11 RX ORDER — FAMOTIDINE 10 MG/ML
20 INJECTION, SOLUTION INTRAVENOUS
OUTPATIENT
Start: 2022-12-09

## 2022-11-11 RX ADMIN — INSULIN HUMAN 6 UNITS: 100 INJECTION, SOLUTION PARENTERAL at 10:29

## 2022-11-11 RX ADMIN — LANREOTIDE ACETATE 120 MG: 120 INJECTION SUBCUTANEOUS at 10:22

## 2022-11-11 NOTE — PROGRESS NOTES
Branden Rodriguez                                                                                                                  11/11/2022  MRN:   [id number]  YOB: 1970  PCP:                           Deisi Lynch MD  Referring Physician: No ref. provider found  Treating Physician Name: Ceasar Hoskins MD      Reason for visit:  Chief Complaint   Patient presents with    Follow-up     Review status of disease   Discussed treatment plan. Current problems:  Well-differentiated, grade 1, pancreatic neuroendocrine tumor, clinical stage T2 N0 M0  Elevated gastrin (patient on Protonix)  Multiple comorbidities including diabetes mellitus obesity, renal insufficiency and severe hepatic steatosis  Family history of leukemia     Active and recent treatments:  Patient medically unfit for surgery  Lanreotide-11/2022    Interim History:  Patient presents to the clinic for a follow-up visit and to discuss results of lab work-up and other relevant clinical data. Patient denies any new complaints or interval events. She is scheduled to start lanreotide today. Patient did not take her diabetes medication her glucose is high. Denies abdominal pain. UTI has resolved    During this visit patient's allergy, social, medical, surgical history and medications were reviewed and updated. Summary of Case/History:    Branden Rodriguez a 47 y. o.female is a patient who presents to the clinic to establish care and for further work-up and evaluation. Patient has multiple comorbidities including diabetes COPD hypertension morbid obesity. Patient initially presented to the emergency department with complaints of right lower quadrant pain radiating to the back. Work-up revealed acute kidney injury pyelonephritis. Patient CT scan without contrast also showed a possible solid-appearing lesion in the pancreatic uncinate process there was no biliary or pancreatic ductal dilatation noted.   There were peripancreatic lymph nodes noted. There was also evidence of severe hepatic steatosis. Patient underwent EGD which showed erythematous changes in the cystic gastric antrum and possible gave. There was 8 mm bleeding C-cell polyp in the duodenum which was removed. EUS of the pancreas demonstrated hypoechoic solid mass in the pancreatic uncinate process measuring 2.5 x 2 cm. Patient underwent biopsy which revealed well differentiated, grade 1 PNET with Ki-67 less than 3%. Lymph node biopsy was negative. On history patient denies any symptoms of carcinoid syndrome. Her performance status is not very good. She uses a wheelchair to get around. Patient underwent dotatate PET scan which showed hepatic steatosis solid lesion in the pancreatic head.     Past Medical History:   Past Medical History:   Diagnosis Date    Arthritis     Diabetes mellitus ([address]     Hypertension     Obese        Past Surgical History:     Past Surgical History:   Procedure Laterality Date    HERNIA REPAIR      TUBAL LIGATION      UPPER GASTROINTESTINAL ENDOSCOPY N/A 2022    EGD W/EUS FNA performed by Mariel Nickerson MD at [place] [id number]  2022    EGD BIOPSY performed by Mariel Nickerson MD at [facility]       Patient Family Social History:  History of leukemia    Social History     Socioeconomic History    Marital status: Single     Spouse name: None    Number of children: None    Years of education: None    Highest education level: None   Tobacco Use    Smoking status: Former     Types: Cigarettes     Quit date: 2017     Years since quittin.8    Smokeless tobacco: Never    Tobacco comments:     Hx 1 pack per month quit 5+ years ago    Substance and Sexual Activity    Alcohol use: No     Comment: quit drinking on w/e's 1 year ago    Drug use: No     Comment: quit smoking marijuana 1 year ago       Current Medications:     Current Outpatient Medications   Medication Sig Dispense Refill    glipiZIDE (GLUCOTROL) 5 MG tablet Take 5 mg by mouth daily      levothyroxine (SYNTHROID) 100 MCG tablet Take 100 mcg by mouth Daily      pantoprazole (PROTONIX) 40 MG tablet Take 1 tablet by mouth every morning (before breakfast) 30 tablet 3    docusate sodium (COLACE) 100 MG capsule Take 1 capsule by mouth 2 times daily 14 capsule 0    simvastatin (ZOCOR) 40 MG tablet Take 40 mg by mouth nightly      acetaminophen (TYLENOL) 325 MG tablet Take 650 mg by mouth every 6 hours as needed for Pain      benzonatate (TESSALON) 100 MG capsule Take 1 capsule by mouth 3 times daily as needed for Cough 20 capsule 0    LISINOPRIL PO Take 20 mg by mouth daily. No current facility-administered medications for this visit. Allergies:   Codeine and Percocet [oxycodone-acetaminophen]    Review of Systems:    Constitutional: No fever or chills. No night sweats, no weight loss positive fatigue  Eyes: No eye discharge, double vision, or eye pain   HEENT: negative for sore mouth, sore throat, hoarseness and voice change   Respiratory: negative for cough , sputum, dyspnea, wheezing, hemoptysis, chest pain   Cardiovascular: negative for chest pain, dyspnea, palpitations, orthopnea, PND   Gastrointestinal: negative for nausea, vomiting, diarrhea, constipation, abdominal pain, Dysphagia, hematemesis and hematochezia   Genitourinary: negative for frequency, dysuria, nocturia, urinary incontinence, and hematuria   Integument: negative for rash, skin lesions, bruises.    Hematologic/Lymphatic: negative for easy bruising, bleeding, lymphadenopathy, or petechiae   Endocrine: negative for heat or cold intolerance,weight changes, change in bowel habits and hair loss   Musculoskeletal: negative for myalgias, arthralgias, pain, joint swelling,and bone pain   Neurological: negative for headaches, dizziness, seizures, weakness, numbness        Physical Exam:    Vitals: BP (!) 142/98   Pulse 92   Temp 97.4 °F (36.3 °C) (Temporal)   Wt 220 lb 12.8 oz (100.2 kg)   BMI 43.85 kg/m²   General appearance - well appearing, no in pain or distress.   Patient in electric wheelchair  Mental status - AAO X3  Eyes - pupils equal and reactive, extraocular eye movements intact  Mouth - mucous membranes moist, pharynx normal without lesions  Neck - supple, no significant adenopathy  Lymphatics - no palpable lymphadenopathy, no hepatosplenomegaly  Chest - clear to auscultation, no wheezes, rales or rhonchi, symmetric air entry  Heart - normal rate, regular rhythm, normal S1, S2, no murmurs  Abdomen - soft, nontender, nondistended, no masses or organomegaly  Neurological - alert, oriented, normal speech, no focal findings or movement disorder noted  Extremities - peripheral pulses normal, no pedal edema, no clubbing or cyanosis  Skin - normal coloration and turgor, no rashes, no suspicious skin lesions noted       DATA:    Results for orders placed or performed during the hospital encounter of 11/11/22   Comprehensive Metabolic Panel   Result Value Ref Range    Glucose 304 (H) 70 - 99 mg/dL    BUN 15 6 - 20 mg/dL    Creatinine 1.02 (H) 0.50 - 0.90 mg/dL    Est, Glom Filt Rate >60 >60 mL/min/1.73m2    Calcium 9.5 8.6 - 10.4 mg/dL    Sodium 134 (L) 135 - 144 mmol/L    Potassium 4.6 3.7 - 5.3 mmol/L    Chloride 99 98 - 107 mmol/L    CO2 24 20 - 31 mmol/L    Anion Gap 11 9 - 17 mmol/L    Alkaline Phosphatase 128 (H) 35 - 104 U/L    ALT 48 (H) 5 - 33 U/L    AST 41 (H) <32 U/L    Total Bilirubin 0.3 0.3 - 1.2 mg/dL    Total Protein 7.9 6.4 - 8.3 g/dL    Albumin 4.4 3.5 - 5.2 g/dL    Albumin/Globulin Ratio 1.3 1.0 - 2.5   CBC with Auto Differential   Result Value Ref Range    WBC 5.5 3.5 - 11.0 k/uL    RBC 4.21 4.0 - 5.2 m/uL    Hemoglobin 12.7 12.0 - 16.0 g/dL    Hematocrit 39.2 36 - 46 %    MCV 93.1 80 - 100 fL    MCH 30.2 26 - 34 pg    MCHC 32.4 31 - 37 g/dL    RDW 12.8 12.5 - 15.4 %    Platelets 808 354 - 130 k/uL    MPV 9.2 8.0 - 14.0 fL    Seg Neutrophils 60 36 - 66 % Lymphocytes 28 24 - 44 %    Monocytes 7 2 - 11 %    Eosinophils % 4 1 - 4 %    Basophils 1 0 - 2 %    Segs Absolute 3.31 1.8 - 7.7 k/uL    Absolute Lymph # 1.54 1.0 - 4.8 k/uL    Absolute Mono # 0.36 0.1 - 1.2 k/uL    Absolute Eos # 0.22 0.0 - 0.4 k/uL    Basophils Absolute 0.05 0.0 - 0.2 k/uL     DOTATATE Scan 10/5/2022  DOTATATE avid mass at the pancreatic head consistent with history of   carcinoid. CT Result (most recent):  CT ABDOMEN PELVIS WO IV CONTRAST 09/19/2022     Narrative  EXAMINATION:  CT OF THE ABDOMEN AND PELVIS WITHOUT CONTRAST 9/19/2022 6:07 pm     TECHNIQUE:  CT of the abdomen and pelvis was performed without the administration of  intravenous contrast. Multiplanar reformatted images are provided for review. Automated exposure control, iterative reconstruction, and/or weight based  adjustment of the mA/kV was utilized to reduce the radiation dose to as low  as reasonably achievable. COMPARISON:  04/27/2016     HISTORY:  ORDERING SYSTEM PROVIDED HISTORY: ABDMONIAL PAIN  TECHNOLOGIST PROVIDED HISTORY:  ABDMONIAL PAIN     Decision Support Exception - unselect if not a suspected or confirmed  emergency medical condition->Emergency Medical Condition (MA)  Is the patient pregnant?->No     FINDINGS:  Lower Chest: Subsegmental atelectasis in the lung bases. Coronary artery  atherosclerosis. Organs: Liver is diffusely hypoattenuating with fatty sparing along the  gallbladder fossa. Within the limitations of a noncontrast examination, no  acute abnormality within the spleen, gallbladder, pancreas, or adrenal  glands. There is a 2.5 x 2.3 cm solid-appearing lesion in the pancreatic  head. No nephrolithiasis or hydronephrosis. GI/Bowel: Stomach is nondistended. The small bowel is nondilated. The  appendix is normal in caliber. The colon is nondilated. Pelvis: The bladder is nondistended. No vesicular stone. The uterus is  present.      Peritoneum/Retroperitoneum: No ascites or pneumoperitoneum. Abdominal aorta  is normal in caliber. There is a retroaortic left renal vein. There are  shotty mesenteric and retroperitoneal lymph nodes. There is a mildly  enlarged periportal node. Bones/Soft Tissues: Degenerative changes of the lower lumbar spine. Impression  1. Hepatic steatosis. 2. Solid lesion in the pancreatic head is indeterminate. Pancreatic protocol  MRI or CT is recommended for further evaluation. 3. Mildly enlarged periportal lymph node. CT ABDOMEN PELVIS WO IV CONTRAST 04/27/2016     Narrative  FINAL REPORT     EXAM:  CT ABDOMEN PELVIS WO IV CONTRAST     HISTORY:  concern of appendicitis, history of hernia repair     TECHNIQUE:  Spiral multi slice axial acquisition was obtained through the abdomen and pelvis without intravenous or oral contrast administration. Axial, coronal and sagittal images were reconstructed     PRIORS:  04/04/2013. FINDINGS:  The lung bases are free of infiltrates. There is diffuse fatty infiltration in the liver. This is unchanged. The spleen, pancreas, gallbladder, adrenal glands and stomach are unremarkable. Both kidneys are normal in size. There is no hydronephrosis. There are no renal calculi or renal masses seen. The ureters are not dilated. The bowel is not dilated. There is no bowel wall thickening or mesenteric edema seen. The appendix is visualized and appears normal in size. There is no retroperitoneal adenopathy. The aorta is normal in size. The uterus appears normal in size. The ovaries are not enlarged. The bladder and rectum are unremarkable. The osseous structures are all grossly maintained. Impression:  Fatty infiltration in the liver.      No other acute abdominal or pelvic findings        Electronically Signed By: Kendra Fernandes   on  04/27/2016  17:51        MRI Result (most recent):  MRI ABDOMEN W WO CONTRAST MRCP 09/20/2022     Narrative  EXAMINATION:  MRI OF THE ABDOMEN WITH AND WITHOUT CONTRAST AND MRCP     9/20/2022 8:10 am     TECHNIQUE:  Multiplanar multisequence MRI of the abdomen was performed with and without  the administration of intravenous contrast.     COMPARISON:  Abdomen and pelvis CTs dating to 04/04/2013     HISTORY:  ORDERING SYSTEM PROVIDED HISTORY: eval panc mass, abdominal pain, fever  TECHNOLOGIST PROVIDED HISTORY:  eval panc mass, abdominal pain, fever  What is the sedation requirement?->None  Reason for Exam: eval panc mass, abdominal pain, fever     FINDINGS:  Patient motion in some areas, partially limiting evaluation of those areas. LOWER CHEST:  [place] dependent atelectasis in the bilateral lower lobes on  some sequences. Normal heart size. No pleural nor pericardial effusions. LIVER:  Mildly enlarged with the right hemiliver possibly accentuated by a  normal variant Riedel's lobe. Severe steatosis with no findings of cirrhosis. GALLBLADDER/BILE DUCTS:  Normal gallbladder. No intrahepatic nor  extrahepatic biliary dilation. No obvious ductal filling defects nor  strictures. PANCREAS:  Typical duct configuration without dilation. No obvious ductal  filling defects nor strictures. Mildly atrophic parenchyma. 2.6 cm x 2.2 cm  x 2.3 cm T2 hyperintense, T1 intermediate lesion in the uncinate process with  diffuse hyperenhancement on all postcontrast sequences and no significant  diffusion restriction. SPLEEN:  Normal.     ADRENAL GLANDS:  Normal.     KIDNEYS:  Bilateral renal peripelvic cysts, more prominent on the left. GI/BOWEL:  Normal course and caliber of the stomach, small bowel, and colon  without obstruction. Normal appendix with retrocecal location. PELVIS:  Not included. PERITONEUM/RETROPERITONEUM:  Long-term stability of mildly enlarged  portohepatic and portocaval lymph nodes, likely reactive.   Long-term  stability of nonenlarged to mildly enlarged mesenteric nodes with subtle  mesenteric stranding, also likely reactive though the appearance could be  seen with mesenteric panniculitis or other etiologies. No free  intraperitoneal fluid. VESSELS:  Typical arterial anatomy. Patent veins. SOFT TISSUES/BONES:  Laxity of the anterior and lateral abdominal wall  musculature with diastasis recti. No suspicious marrow lesions. Multilevel  degenerative disc disease. Impression  1. A 2.6 cm lesion arising from the pancreatic uncinate process has features  highly suggestive of a neuroendocrine neoplasm. Consider endoscopic biopsy  and serum biochemical evaluation. 2. No findings of metastatic disease in the abdomen. 3. Severe hepatic steatosis. EGD 9/19/2022[de-identified]   Esophagus: normal.   Stomach: Stomach had mild to moderate gastric erythema in the antrum and possible GA VE. This was biopsied with cold biopsy forceps. There was a 8 mm sessile polyp just prior to the pylorus which had mild oozing of blood. This was snared with cold snare and clipped with 1 metal resolution clip with complete control of bleeding. The rest of the stomach was otherwise normal.  Duodenum: normal     EUS 9/19/2022:  Pancreas: A round mass was identified in the pancreatic head-near the uncinate process. The mass was hypoechoic, homogeneous and solid. The mass measured 25 mm by 20 mm in maximal cross-sectional diameter. The endosonographic borders were well-defined. Fine needle biopsy was performed. Color Doppler imaging was utilized prior to needle puncture to confirm a lack of significant vascular structures within the needle path. 2 passes were made with the 22 gauge ultrasound biopsy needle using a transduodenal approach. A stylet was used. Estimated blood loss was minimal. Verification of patient identification for the specimen was done by the physician and nurse using the patient's name and medical record number. The rest of the pancreas was otherwise normal. Normal PD. No signs of chronic pancreatitis.    CBD: Normal, no stones, sludge. CBD diameter:  3 mm. Ampulla: Normal.  Left lobe of liver: normal.   Lymphadenopathy: 1 hypoechoic lymph node was visualized in the peripancreatic area which measured 7 mm x 6 mm in diameter, round in shape with well-defined borders. Fine-needle biopsy of this lymph node was performed with 22-gauge shark core needle via transduodenal route. 2 passes were made. Impression:  Well-differentiated, grade 1, pancreatic neuroendocrine tumor, clinical stage T2 N0 M0  Elevated gastrin (patient on Protonix)  Multiple comorbidities including diabetes mellitus obesity, renal insufficiency and severe hepatic steatosis  Family history of leukemia  Gave    Plan:  I had a detailed discussion with the patient and personally went over results of lab work-up imaging studies and other relevant clinical data. Discussed natural history of PNET. Patient not a surgical candidate as she is medically unfit  We will start lanreotide  Reviewed logistics prognostic data and potential side effects  Will obtain serial imaging studies to assess disease status  Patient did not take her glipizide for the last couple of days. Her glucose is high. I will give 6 units of regular insulin. Patient was also given a refill on glipizide. She was strongly encouraged to follow-up with her primary care physician. Patient also noted to have significantly elevated gastrin however patient is on a PPI for gave  NCCN guidelines were reviewed and discussed with the patient. The diagnosis and care plan were discussed with the patient in detail. I discussed the natural history of the disease, prognosis, risks and goals of therapy and answered all the patients questions to the best of my ability. Patient expressed understanding and was in agreement.       Enedina Cardenas MD          This note is created with the assistance of a speech recognition program.  While intending to generate a document that actually reflects the

## 2022-11-11 NOTE — TELEPHONE ENCOUNTER
Name: Karl Gage  : 1970  MRN: [id number]    Oncology Navigation Follow-Up Note    Contact Type:  Medical Oncology    Notes: Pt @ [facility] for Dr. Marcelo Hood f/u & tx. Met with pt prior to f/u. Pt denied questions/concerns. Instructed pt may contact writer prn. Will continue to follow.     Electronically signed by Esteban Salguero RN on 2022 at 9:51 AM

## 2022-11-11 NOTE — PROGRESS NOTES
Patient arrived for somatuline. Pt had visit with Dr Belinda Campbell, labs reviewed, received order to proceed with treatment today and give 6 units of regular insulin. Pt given [facility] education sheet. Pt stable at discharge. Scheduled to return 12/9/22 for MD visit and somatuline. Detail Level: Detailed Quality 431: Preventive Care And Screening: Unhealthy Alcohol Use - Screening: Patient screened for unhealthy alcohol use using a single question and scores less than 2 times per year

## 2022-11-11 NOTE — TELEPHONE ENCOUNTER
AVS from 11/11/22    Regular inslun 6 unit S/c   Tx today   Rv in 4 weeks with tx       Tx today as scheduled     Rv scheduled for 12/9  @ 10:00 am with tx to follow    Pt was given AVS and appointment schedule    Electronically signed by Toya Pal on 11/11/2022 at 10:49 AM

## 2022-12-09 ENCOUNTER — HOSPITAL ENCOUNTER (OUTPATIENT)
Dept: INFUSION THERAPY | Age: 52
Discharge: HOME | End: 2022-12-09
Payer: MEDICARE

## 2022-12-09 ENCOUNTER — OFFICE VISIT (OUTPATIENT)
Dept: ONCOLOGY | Age: 52
End: 2022-12-09
Payer: MEDICARE

## 2022-12-09 ENCOUNTER — TELEPHONE (OUTPATIENT)
Dept: ONCOLOGY | Age: 52
End: 2022-12-09

## 2022-12-09 VITALS
DIASTOLIC BLOOD PRESSURE: 98 MMHG | SYSTOLIC BLOOD PRESSURE: 141 MMHG | HEART RATE: 70 BPM | WEIGHT: 220.9 LBS | BODY MASS INDEX: 43.87 KG/M2 | TEMPERATURE: 97.4 F

## 2022-12-09 DIAGNOSIS — R73.9 HYPERGLYCEMIA: ICD-10-CM

## 2022-12-09 DIAGNOSIS — D3A.8 NEUROENDOCRINE TUMOR: ICD-10-CM

## 2022-12-09 DIAGNOSIS — D3A.8 PRIMARY PANCREATIC NEUROENDOCRINE TUMOR: Primary | ICD-10-CM

## 2022-12-09 DIAGNOSIS — K31.819 GAVE (GASTRIC ANTRAL VASCULAR ECTASIA): ICD-10-CM

## 2022-12-09 LAB
ABSOLUTE EOS #: 0.3 K/UL (ref 0–0.4)
ABSOLUTE LYMPH #: 1.4 K/UL (ref 1–4.8)
ABSOLUTE MONO #: 0.2 K/UL (ref 0.1–1.2)
ALBUMIN SERPL-MCNC: 4.2 G/DL (ref 3.5–5.2)
ALBUMIN/GLOBULIN RATIO: 1.4 (ref 1–2.5)
ALP BLD-CCNC: 138 U/L (ref 35–104)
ALT SERPL-CCNC: 51 U/L (ref 5–33)
ANION GAP SERPL CALCULATED.3IONS-SCNC: 10 MMOL/L (ref 9–17)
AST SERPL-CCNC: 49 U/L
BASOPHILS # BLD: 1 % (ref 0–2)
BASOPHILS ABSOLUTE: 0.1 K/UL (ref 0–0.2)
BILIRUB SERPL-MCNC: 0.3 MG/DL (ref 0.3–1.2)
BUN BLDV-MCNC: 16 MG/DL (ref 6–20)
CALCIUM SERPL-MCNC: 9.3 MG/DL (ref 8.6–10.4)
CHLORIDE BLD-SCNC: 100 MMOL/L (ref 98–107)
CO2: 22 MMOL/L (ref 20–31)
CREAT SERPL-MCNC: 0.99 MG/DL (ref 0.5–0.9)
EOSINOPHILS RELATIVE PERCENT: 6 % (ref 1–4)
GFR SERPL CREATININE-BSD FRML MDRD: >60 ML/MIN/1.73M2
GLUCOSE BLD-MCNC: 343 MG/DL (ref 70–99)
HCT VFR BLD CALC: 36.6 % (ref 36–46)
HEMOGLOBIN: 12.2 G/DL (ref 12–16)
LYMPHOCYTES # BLD: 28 % (ref 24–44)
MCH RBC QN AUTO: 30.3 PG (ref 26–34)
MCHC RBC AUTO-ENTMCNC: 33.4 G/DL (ref 31–37)
MCV RBC AUTO: 90.6 FL (ref 80–100)
MONOCYTES # BLD: 4 % (ref 2–11)
PDW BLD-RTO: 13.7 % (ref 12.5–15.4)
PLATELET # BLD: 208 K/UL (ref 140–450)
PMV BLD AUTO: 7.4 FL (ref 6–12)
POTASSIUM SERPL-SCNC: 3.8 MMOL/L (ref 3.7–5.3)
RBC # BLD: 4.04 M/UL (ref 4–5.2)
SEG NEUTROPHILS: 61 % (ref 36–66)
SEGMENTED NEUTROPHILS ABSOLUTE COUNT: 3 K/UL (ref 1.8–7.7)
SODIUM BLD-SCNC: 132 MMOL/L (ref 135–144)
TOTAL PROTEIN: 7.3 G/DL (ref 6.4–8.3)
WBC # BLD: 4.9 K/UL (ref 3.5–11)

## 2022-12-09 PROCEDURE — G8484 FLU IMMUNIZE NO ADMIN: HCPCS | Performed by: INTERNAL MEDICINE

## 2022-12-09 PROCEDURE — G8427 DOCREV CUR MEDS BY ELIG CLIN: HCPCS | Performed by: INTERNAL MEDICINE

## 2022-12-09 PROCEDURE — 6370000000 HC RX 637 (ALT 250 FOR IP): Performed by: INTERNAL MEDICINE

## 2022-12-09 PROCEDURE — G8417 CALC BMI ABV UP PARAM F/U: HCPCS | Performed by: INTERNAL MEDICINE

## 2022-12-09 PROCEDURE — 99214 OFFICE O/P EST MOD 30 MIN: CPT | Performed by: INTERNAL MEDICINE

## 2022-12-09 PROCEDURE — 96372 THER/PROPH/DIAG INJ SC/IM: CPT

## 2022-12-09 PROCEDURE — 99211 OFF/OP EST MAY X REQ PHY/QHP: CPT | Performed by: INTERNAL MEDICINE

## 2022-12-09 PROCEDURE — 36415 COLL VENOUS BLD VENIPUNCTURE: CPT

## 2022-12-09 PROCEDURE — 85025 COMPLETE CBC W/AUTO DIFF WBC: CPT

## 2022-12-09 PROCEDURE — 80053 COMPREHEN METABOLIC PANEL: CPT

## 2022-12-09 PROCEDURE — 3017F COLORECTAL CA SCREEN DOC REV: CPT | Performed by: INTERNAL MEDICINE

## 2022-12-09 PROCEDURE — 6360000002 HC RX W HCPCS: Performed by: INTERNAL MEDICINE

## 2022-12-09 PROCEDURE — 1036F TOBACCO NON-USER: CPT | Performed by: INTERNAL MEDICINE

## 2022-12-09 RX ORDER — GLIPIZIDE 5 MG/1
5 TABLET ORAL 2 TIMES DAILY
Qty: 60 TABLET | Refills: 3 | Status: SHIPPED | OUTPATIENT
Start: 2022-12-09

## 2022-12-09 RX ORDER — LANREOTIDE ACETATE 120 MG/.5ML
120 INJECTION SUBCUTANEOUS ONCE
OUTPATIENT
Start: 2023-01-06 | End: 2023-01-06

## 2022-12-09 RX ORDER — SODIUM CHLORIDE 9 MG/ML
INJECTION, SOLUTION INTRAVENOUS CONTINUOUS
OUTPATIENT
Start: 2023-01-06

## 2022-12-09 RX ORDER — EPINEPHRINE 1 MG/ML
0.3 INJECTION, SOLUTION, CONCENTRATE INTRAVENOUS PRN
OUTPATIENT
Start: 2023-01-06

## 2022-12-09 RX ORDER — LANREOTIDE ACETATE 120 MG/.5ML
120 INJECTION SUBCUTANEOUS ONCE
Status: COMPLETED | OUTPATIENT
Start: 2022-12-09 | End: 2022-12-09

## 2022-12-09 RX ORDER — DIPHENHYDRAMINE HYDROCHLORIDE 50 MG/ML
50 INJECTION INTRAMUSCULAR; INTRAVENOUS
OUTPATIENT
Start: 2023-01-06

## 2022-12-09 RX ORDER — ACETAMINOPHEN 325 MG/1
650 TABLET ORAL
OUTPATIENT
Start: 2023-01-06

## 2022-12-09 RX ORDER — ONDANSETRON 2 MG/ML
8 INJECTION INTRAMUSCULAR; INTRAVENOUS
OUTPATIENT
Start: 2023-01-06

## 2022-12-09 RX ORDER — FAMOTIDINE 10 MG/ML
20 INJECTION, SOLUTION INTRAVENOUS
OUTPATIENT
Start: 2023-01-06

## 2022-12-09 RX ORDER — ALBUTEROL SULFATE 90 UG/1
4 AEROSOL, METERED RESPIRATORY (INHALATION) PRN
OUTPATIENT
Start: 2023-01-06

## 2022-12-09 RX ADMIN — LANREOTIDE ACETATE 120 MG: 120 INJECTION SUBCUTANEOUS at 11:25

## 2022-12-09 RX ADMIN — INSULIN HUMAN 4 UNITS: 100 INJECTION, SOLUTION PARENTERAL at 11:23

## 2022-12-09 NOTE — PROGRESS NOTES
Nura Blake                                                                                                                  12/9/2022  MRN:   [id number]  YOB: 1970  PCP:                           Serena Zapata MD  Referring Physician: No ref. provider found  Treating Physician Name: Enedina Cardenas MD      Reason for visit:  Chief Complaint   Patient presents with    Follow-up     Review status of disease   Discussed treatment plan. Current problems:  Well-differentiated, grade 1, pancreatic neuroendocrine tumor, clinical stage T2 N0 M0  Elevated gastrin (patient on Protonix)  Multiple comorbidities including diabetes mellitus obesity, renal insufficiency and severe hepatic steatosis  Family history of leukemia     Active and recent treatments:  Patient medically unfit for surgery  Lanreotide-11/2022    Interim History:  Patient presents to the clinic for a follow-up visit and to discuss results of lab work-up and other relevant clinical data with cycle #2. She reports she had mild abdominal pain immediately following cycle #1 that resolved and has been doing well since. She has no new complaints or concerns. She had fall from her scooter yesterday, no injuries sustained. During this visit patient's allergy, social, medical, surgical history and medications were reviewed and updated. Summary of Case/History:    Nura Blake a 47 y. o.female is a patient who presents to the clinic to establish care and for further work-up and evaluation. Patient has multiple comorbidities including diabetes COPD hypertension morbid obesity. Patient initially presented to the emergency department with complaints of right lower quadrant pain radiating to the back. Work-up revealed acute kidney injury pyelonephritis. Patient CT scan without contrast also showed a possible solid-appearing lesion in the pancreatic uncinate process there was no biliary or pancreatic ductal dilatation noted.   There were peripancreatic lymph nodes noted. There was also evidence of severe hepatic steatosis. Patient underwent EGD which showed erythematous changes in the cystic gastric antrum and possible gave. There was 8 mm bleeding C-cell polyp in the duodenum which was removed. EUS of the pancreas demonstrated hypoechoic solid mass in the pancreatic uncinate process measuring 2.5 x 2 cm. Patient underwent biopsy which revealed well differentiated, grade 1 PNET with Ki-67 less than 3%. Lymph node biopsy was negative. On history patient denies any symptoms of carcinoid syndrome. Her performance status is not very good. She uses a wheelchair to get around. Patient underwent dotatate PET scan which showed hepatic steatosis solid lesion in the pancreatic head.     Past Medical History:   Past Medical History:   Diagnosis Date    Arthritis     Diabetes mellitus ([address]     Hypertension     Obese        Past Surgical History:     Past Surgical History:   Procedure Laterality Date    HERNIA REPAIR      TUBAL LIGATION      UPPER GASTROINTESTINAL ENDOSCOPY N/A 2022    EGD W/EUS FNA performed by Mariel Nickerson MD at [place] [address]  2022    EGD BIOPSY performed by Mariel Nickerson MD at [facility]       Patient Family Social History:  History of leukemia    Social History     Socioeconomic History    Marital status: Single     Spouse name: None    Number of children: None    Years of education: None    Highest education level: None   Tobacco Use    Smoking status: Former     Types: Cigarettes     Quit date: 2017     Years since quittin.9    Smokeless tobacco: Never    Tobacco comments:     Hx 1 pack per month quit 5+ years ago    Substance and Sexual Activity    Alcohol use: No     Comment: quit drinking on w/e's 1 year ago    Drug use: No     Comment: quit smoking marijuana 1 year ago       Current Medications:     Current Outpatient Medications   Medication Sig Dispense Refill    glipiZIDE (GLUCOTROL) 5 MG tablet Take 1 tablet by mouth daily 30 tablet 0    levothyroxine (SYNTHROID) 100 MCG tablet Take 100 mcg by mouth Daily      pantoprazole (PROTONIX) 40 MG tablet Take 1 tablet by mouth every morning (before breakfast) 30 tablet 3    docusate sodium (COLACE) 100 MG capsule Take 1 capsule by mouth 2 times daily 14 capsule 0    simvastatin (ZOCOR) 40 MG tablet Take 40 mg by mouth nightly      acetaminophen (TYLENOL) 325 MG tablet Take 650 mg by mouth every 6 hours as needed for Pain      benzonatate (TESSALON) 100 MG capsule Take 1 capsule by mouth 3 times daily as needed for Cough 20 capsule 0    LISINOPRIL PO Take 20 mg by mouth daily. No current facility-administered medications for this visit. Allergies:   Codeine and Percocet [oxycodone-acetaminophen]    Review of Systems:    Constitutional: No fever or chills. No night sweats, no weight loss   Eyes: No eye discharge, double vision, or eye pain   HEENT: negative for sore mouth, sore throat, hoarseness and voice change   Respiratory: negative for cough , sputum, dyspnea, wheezing, hemoptysis, chest pain   Cardiovascular: negative for chest pain, dyspnea, palpitations, orthopnea, PND   Gastrointestinal: negative for nausea, vomiting, diarrhea, constipation, Dysphagia, hematemesis and hematochezia  +abd pain, resolved   Genitourinary: negative for frequency, dysuria, nocturia, urinary incontinence, and hematuria   Integument: negative for rash, skin lesions, bruises.    Hematologic/Lymphatic: negative for easy bruising, bleeding, lymphadenopathy, or petechiae   Endocrine: negative for heat or cold intolerance,weight changes, change in bowel habits and hair loss   Musculoskeletal: negative for myalgias, arthralgias, pain, joint swelling,and bone pain   Neurological: negative for headaches, dizziness, seizures, weakness, numbness        Physical Exam:    Vitals: BP (!) 141/98   Pulse 70   Temp 97.4 °F (36.3 °C) (Temporal)   Wt 220 lb 14.4 oz (100.2 kg)   BMI 43.87 kg/m²   General appearance - well appearing, no in pain or distress.   Patient in electric wheelchair  Mental status - AAO X3  Eyes - pupils equal and reactive, extraocular eye movements intact  Mouth - mucous membranes moist, pharynx normal without lesions  Neck - supple, no significant adenopathy  Lymphatics - no palpable lymphadenopathy, no hepatosplenomegaly  Chest - clear to auscultation, no wheezes, rales or rhonchi, symmetric air entry  Heart - normal rate, regular rhythm, normal S1, S2, no murmurs  Abdomen - soft, nontender, nondistended, no masses or organomegaly  Neurological - alert, oriented, normal speech, no focal findings or movement disorder noted  Extremities - peripheral pulses normal, no pedal edema, no clubbing or cyanosis  Skin - normal coloration and turgor, no rashes, no suspicious skin lesions noted       DATA:    Results for orders placed or performed during the hospital encounter of 12/09/22   Comprehensive Metabolic Panel   Result Value Ref Range    Glucose 343 (H) 70 - 99 mg/dL    BUN 16 6 - 20 mg/dL    Creatinine 0.99 (H) 0.50 - 0.90 mg/dL    Est, Glom Filt Rate >60 >60 mL/min/1.73m2    Calcium 9.3 8.6 - 10.4 mg/dL    Sodium 132 (L) 135 - 144 mmol/L    Potassium 3.8 3.7 - 5.3 mmol/L    Chloride 100 98 - 107 mmol/L    CO2 22 20 - 31 mmol/L    Anion Gap 10 9 - 17 mmol/L    Alkaline Phosphatase 138 (H) 35 - 104 U/L    ALT 51 (H) 5 - 33 U/L    AST 49 (H) <32 U/L    Total Bilirubin 0.3 0.3 - 1.2 mg/dL    Total Protein 7.3 6.4 - 8.3 g/dL    Albumin 4.2 3.5 - 5.2 g/dL    Albumin/Globulin Ratio 1.4 1.0 - 2.5   CBC with Auto Differential   Result Value Ref Range    WBC 4.9 3.5 - 11.0 k/uL    RBC 4.04 4.0 - 5.2 m/uL    Hemoglobin 12.2 12.0 - 16.0 g/dL    Hematocrit 36.6 36 - 46 %    MCV 90.6 80 - 100 fL    MCH 30.3 26 - 34 pg    MCHC 33.4 31 - 37 g/dL    RDW 13.7 12.5 - 15.4 %    Platelets 324 684 - 211 k/uL    MPV 7.4 6.0 - 12.0 fL    Seg Neutrophils 61 36 - 66 %    Lymphocytes 28 24 - 44 %    Monocytes 4 2 - 11 %    Eosinophils % 6 (H) 1 - 4 %    Basophils 1 0 - 2 %    Segs Absolute 3.00 1.8 - 7.7 k/uL    Absolute Lymph # 1.40 1.0 - 4.8 k/uL    Absolute Mono # 0.20 0.1 - 1.2 k/uL    Absolute Eos # 0.30 0.0 - 0.4 k/uL    Basophils Absolute 0.10 0.0 - 0.2 k/uL           Impression:  Well-differentiated, grade 1, pancreatic neuroendocrine tumor, clinical stage T2 N0 M0  Elevated gastrin (patient on Protonix)  Multiple comorbidities including diabetes mellitus obesity, renal insufficiency and severe hepatic steatosis  Family history of leukemia  Gave    Plan:  Her lab work was reviewed, counts are adequate, hyperglycemia. I completed toxicity check. She did well with initial dose and we reviewed plan for 3-4 cycles followed by imaging to assess for possible surgery. We will treat today as per orders. We discussed her current glucotrol dosing and I am writing for increase to double and continue to monitor, we will also plan for 3 units of insulin today. Return in 4 weeks. NCCN guidelines were reviewed and discussed with the patient. The diagnosis and care plan were discussed with the patient in detail. I discussed the natural history of the disease, prognosis, risks and goals of therapy and answered all the patients questions to the best of my ability. Patient expressed understanding and was in agreement. Scribe Attestation   This note was created by Delroy Beck acting as scribe for the physician signing this note  Electronically Signed  Delroy Beck, 12/9/2022  [place]. Attending Attestation   Note was reviewed and edited.   I am in agreement with the note as entered    Chucky Helms MD            This note is created with the assistance of a speech recognition program.  While intending to generate a document that actually reflects the content of the visit, the document can still have some errors including those of syntax and sound a like substitutions which may escape proof reading. It such instances, actual meaning can be extrapolated by contextual diversion.

## 2022-12-09 NOTE — PROGRESS NOTES
Patient arrived for somatuline. Pt had visit with Dr Alberto Pearson, received order to proceed with treatment today and give 4 units regular insulin. Pt stable at discharge. Scheduled to return 1/6/23 for MD visit and somatuline.

## 2022-12-09 NOTE — TELEPHONE ENCOUNTER
AVS from 12/9/22    Give insulin regular 4 units S/c  Tx today rv in 4 weeks with labs     [facility] notified abot insulin  Rv scheduled for 1/6/23 @ 9:45am with tx to follow    PT was given AVS and appt schedule

## 2023-01-06 ENCOUNTER — HOSPITAL ENCOUNTER (OUTPATIENT)
Dept: INFUSION THERAPY | Age: 53
Discharge: HOME | End: 2023-01-06
Payer: MEDICARE

## 2023-01-06 ENCOUNTER — OFFICE VISIT (OUTPATIENT)
Dept: ONCOLOGY | Age: 53
End: 2023-01-06
Payer: MEDICARE

## 2023-01-06 VITALS
HEART RATE: 68 BPM | BODY MASS INDEX: 43.1 KG/M2 | SYSTOLIC BLOOD PRESSURE: 145 MMHG | WEIGHT: 217 LBS | DIASTOLIC BLOOD PRESSURE: 94 MMHG | TEMPERATURE: 97.9 F

## 2023-01-06 DIAGNOSIS — D3A.8 PRIMARY PANCREATIC NEUROENDOCRINE TUMOR: Primary | ICD-10-CM

## 2023-01-06 DIAGNOSIS — R73.9 HYPERGLYCEMIA: ICD-10-CM

## 2023-01-06 DIAGNOSIS — D3A.8 NEUROENDOCRINE TUMOR: Primary | ICD-10-CM

## 2023-01-06 DIAGNOSIS — D3A.8 PRIMARY PANCREATIC NEUROENDOCRINE TUMOR: ICD-10-CM

## 2023-01-06 DIAGNOSIS — Z79.899 HIGH RISK MEDICATION USE: ICD-10-CM

## 2023-01-06 DIAGNOSIS — D3A.8 NEUROENDOCRINE TUMOR: ICD-10-CM

## 2023-01-06 DIAGNOSIS — K31.819 GAVE (GASTRIC ANTRAL VASCULAR ECTASIA): ICD-10-CM

## 2023-01-06 LAB
ABSOLUTE EOS #: 0.5 K/UL (ref 0–0.4)
ABSOLUTE LYMPH #: 1.6 K/UL (ref 1–4.8)
ABSOLUTE MONO #: 0.3 K/UL (ref 0.1–1.2)
ALBUMIN SERPL-MCNC: 4.1 G/DL (ref 3.5–5.2)
ALBUMIN/GLOBULIN RATIO: 1.3 (ref 1–2.5)
ALP BLD-CCNC: 177 U/L (ref 35–104)
ALT SERPL-CCNC: 50 U/L (ref 5–33)
ANION GAP SERPL CALCULATED.3IONS-SCNC: 13 MMOL/L (ref 9–17)
AST SERPL-CCNC: 47 U/L
BASOPHILS # BLD: 1 % (ref 0–2)
BASOPHILS ABSOLUTE: 0 K/UL (ref 0–0.2)
BILIRUB SERPL-MCNC: 0.5 MG/DL (ref 0.3–1.2)
BUN BLDV-MCNC: 13 MG/DL (ref 6–20)
CALCIUM SERPL-MCNC: 9.1 MG/DL (ref 8.6–10.4)
CHLORIDE BLD-SCNC: 99 MMOL/L (ref 98–107)
CO2: 23 MMOL/L (ref 20–31)
CREAT SERPL-MCNC: 1.05 MG/DL (ref 0.5–0.9)
EOSINOPHILS RELATIVE PERCENT: 8 % (ref 1–4)
GFR SERPL CREATININE-BSD FRML MDRD: >60 ML/MIN/1.73M2
GLUCOSE BLD-MCNC: 371 MG/DL (ref 70–99)
HCT VFR BLD CALC: 38.9 % (ref 36–46)
HEMOGLOBIN: 12.8 G/DL (ref 12–16)
LYMPHOCYTES # BLD: 28 % (ref 24–44)
MCH RBC QN AUTO: 29.9 PG (ref 26–34)
MCHC RBC AUTO-ENTMCNC: 33 G/DL (ref 31–37)
MCV RBC AUTO: 90.5 FL (ref 80–100)
MONOCYTES # BLD: 5 % (ref 2–11)
PDW BLD-RTO: 14.1 % (ref 12.5–15.4)
PLATELET # BLD: 211 K/UL (ref 140–450)
PMV BLD AUTO: 8 FL (ref 6–12)
POTASSIUM SERPL-SCNC: 4.1 MMOL/L (ref 3.7–5.3)
RBC # BLD: 4.3 M/UL (ref 4–5.2)
SEG NEUTROPHILS: 58 % (ref 36–66)
SEGMENTED NEUTROPHILS ABSOLUTE COUNT: 3.3 K/UL (ref 1.8–7.7)
SODIUM BLD-SCNC: 135 MMOL/L (ref 135–144)
TOTAL PROTEIN: 7.3 G/DL (ref 6.4–8.3)
WBC # BLD: 5.7 K/UL (ref 3.5–11)

## 2023-01-06 PROCEDURE — 80053 COMPREHEN METABOLIC PANEL: CPT

## 2023-01-06 PROCEDURE — 3017F COLORECTAL CA SCREEN DOC REV: CPT | Performed by: INTERNAL MEDICINE

## 2023-01-06 PROCEDURE — 6360000002 HC RX W HCPCS: Performed by: INTERNAL MEDICINE

## 2023-01-06 PROCEDURE — 96401 CHEMO ANTI-NEOPL SQ/IM: CPT

## 2023-01-06 PROCEDURE — 85025 COMPLETE CBC W/AUTO DIFF WBC: CPT

## 2023-01-06 PROCEDURE — G8417 CALC BMI ABV UP PARAM F/U: HCPCS | Performed by: INTERNAL MEDICINE

## 2023-01-06 PROCEDURE — 1036F TOBACCO NON-USER: CPT | Performed by: INTERNAL MEDICINE

## 2023-01-06 PROCEDURE — 36415 COLL VENOUS BLD VENIPUNCTURE: CPT

## 2023-01-06 PROCEDURE — G8427 DOCREV CUR MEDS BY ELIG CLIN: HCPCS | Performed by: INTERNAL MEDICINE

## 2023-01-06 PROCEDURE — G8484 FLU IMMUNIZE NO ADMIN: HCPCS | Performed by: INTERNAL MEDICINE

## 2023-01-06 PROCEDURE — 96372 THER/PROPH/DIAG INJ SC/IM: CPT

## 2023-01-06 PROCEDURE — 6370000000 HC RX 637 (ALT 250 FOR IP): Performed by: INTERNAL MEDICINE

## 2023-01-06 PROCEDURE — 99211 OFF/OP EST MAY X REQ PHY/QHP: CPT | Performed by: INTERNAL MEDICINE

## 2023-01-06 PROCEDURE — 99214 OFFICE O/P EST MOD 30 MIN: CPT | Performed by: INTERNAL MEDICINE

## 2023-01-06 RX ORDER — FAMOTIDINE 10 MG/ML
20 INJECTION, SOLUTION INTRAVENOUS
OUTPATIENT
Start: 2023-02-03

## 2023-01-06 RX ORDER — DIPHENHYDRAMINE HYDROCHLORIDE 50 MG/ML
50 INJECTION INTRAMUSCULAR; INTRAVENOUS
OUTPATIENT
Start: 2023-02-03

## 2023-01-06 RX ORDER — EPINEPHRINE 1 MG/ML
0.3 INJECTION, SOLUTION, CONCENTRATE INTRAVENOUS PRN
OUTPATIENT
Start: 2023-02-03

## 2023-01-06 RX ORDER — LANREOTIDE ACETATE 120 MG/.5ML
120 INJECTION SUBCUTANEOUS ONCE
OUTPATIENT
Start: 2023-02-03 | End: 2023-02-03

## 2023-01-06 RX ORDER — ALBUTEROL SULFATE 90 UG/1
4 AEROSOL, METERED RESPIRATORY (INHALATION) PRN
OUTPATIENT
Start: 2023-02-03

## 2023-01-06 RX ORDER — LANREOTIDE ACETATE 120 MG/.5ML
120 INJECTION SUBCUTANEOUS ONCE
Status: COMPLETED | OUTPATIENT
Start: 2023-01-06 | End: 2023-01-06

## 2023-01-06 RX ORDER — ACETAMINOPHEN 325 MG/1
650 TABLET ORAL
OUTPATIENT
Start: 2023-02-03

## 2023-01-06 RX ORDER — SODIUM CHLORIDE 9 MG/ML
INJECTION, SOLUTION INTRAVENOUS CONTINUOUS
OUTPATIENT
Start: 2023-02-03

## 2023-01-06 RX ORDER — ONDANSETRON 2 MG/ML
8 INJECTION INTRAMUSCULAR; INTRAVENOUS
OUTPATIENT
Start: 2023-02-03

## 2023-01-06 RX ADMIN — INSULIN HUMAN 6 UNITS: 100 INJECTION, SOLUTION PARENTERAL at 11:15

## 2023-01-06 RX ADMIN — LANREOTIDE ACETATE 120 MG: 120 INJECTION SUBCUTANEOUS at 11:17

## 2023-01-06 NOTE — PROGRESS NOTES
Diamante Vidal                                                                                                                  1/6/2023  MRN:   [id number]  YOB: 1970  PCP:                           Jazzmine Mleton MD  Referring Physician: No ref. provider found  Treating Physician Name: Nemesio Patton MD      Reason for visit:  Chief Complaint   Patient presents with    Follow-up     Review status of disease   Discussed treatment plan. Current problems:  Well-differentiated, grade 1, pancreatic neuroendocrine tumor, clinical stage T2 N0 M0  Elevated gastrin (patient on Protonix)  Multiple comorbidities including diabetes mellitus obesity, renal insufficiency and severe hepatic steatosis  Family history of leukemia     Active and recent treatments:  Patient medically unfit for surgery  Lanreotide-11/2022    Interim History:  Patient presents to the clinic for a follow-up visit and for toxicity check and to review results of her blood work-up. Patient has been tolerating treatment without any unexpected or severe side effects. Denies hospitalization or ER visit. Appetite is good. Weight is stable. Nausea is controlled. During this visit patient's allergy, social, medical, surgical history and medications were reviewed and updated. Summary of Case/History:    Diamante Vidal a 47 y. o.female is a patient who presents to the clinic to establish care and for further work-up and evaluation. Patient has multiple comorbidities including diabetes COPD hypertension morbid obesity. Patient initially presented to the emergency department with complaints of right lower quadrant pain radiating to the back. Work-up revealed acute kidney injury pyelonephritis. Patient CT scan without contrast also showed a possible solid-appearing lesion in the pancreatic uncinate process there was no biliary or pancreatic ductal dilatation noted. There were peripancreatic lymph nodes noted.   There was also evidence of severe hepatic steatosis. Patient underwent EGD which showed erythematous changes in the cystic gastric antrum and possible gave. There was 8 mm bleeding C-cell polyp in the duodenum which was removed. EUS of the pancreas demonstrated hypoechoic solid mass in the pancreatic uncinate process measuring 2.5 x 2 cm. Patient underwent biopsy which revealed well differentiated, grade 1 PNET with Ki-67 less than 3%. Lymph node biopsy was negative. On history patient denies any symptoms of carcinoid syndrome. Her performance status is not very good. She uses a wheelchair to get around. Patient underwent dotatate PET scan which showed hepatic steatosis solid lesion in the pancreatic head.     Past Medical History:   Past Medical History:   Diagnosis Date    Arthritis     Diabetes mellitus ([address]     Hypertension     Obese        Past Surgical History:     Past Surgical History:   Procedure Laterality Date    HERNIA REPAIR      TUBAL LIGATION      UPPER GASTROINTESTINAL ENDOSCOPY N/A 2022    EGD W/EUS FNA performed by Luma Stewart MD at [address] [place] [address]  2022    EGD BIOPSY performed by Luma Stewart MD at [facility]       Patient Family Social History:  History of leukemia    Social History     Socioeconomic History    Marital status: Single     Spouse name: None    Number of children: None    Years of education: None    Highest education level: None   Tobacco Use    Smoking status: Former     Types: Cigarettes     Quit date: 2017     Years since quittin.0    Smokeless tobacco: Never    Tobacco comments:     Hx 1 pack per month quit 5+ years ago    Substance and Sexual Activity    Alcohol use: No     Comment: quit drinking on w/e's 1 year ago    Drug use: No     Comment: quit smoking marijuana 1 year ago       Current Medications:     Current Outpatient Medications   Medication Sig Dispense Refill    glipiZIDE (GLUCOTROL) 5 MG tablet Take 1 tablet by mouth 2 times daily 60 tablet 3    glipiZIDE (GLUCOTROL) 5 MG tablet Take 1 tablet by mouth daily 30 tablet 0    levothyroxine (SYNTHROID) 100 MCG tablet Take 100 mcg by mouth Daily      pantoprazole (PROTONIX) 40 MG tablet Take 1 tablet by mouth every morning (before breakfast) 30 tablet 3    docusate sodium (COLACE) 100 MG capsule Take 1 capsule by mouth 2 times daily 14 capsule 0    simvastatin (ZOCOR) 40 MG tablet Take 40 mg by mouth nightly      acetaminophen (TYLENOL) 325 MG tablet Take 650 mg by mouth every 6 hours as needed for Pain      benzonatate (TESSALON) 100 MG capsule Take 1 capsule by mouth 3 times daily as needed for Cough 20 capsule 0    LISINOPRIL PO Take 20 mg by mouth daily. No current facility-administered medications for this visit. Allergies:   Codeine and Percocet [oxycodone-acetaminophen]    Review of Systems:    Constitutional: No fever or chills. No night sweats, no weight loss   Eyes: No eye discharge, double vision, or eye pain   HEENT: negative for sore mouth, sore throat, hoarseness and voice change   Respiratory: negative for cough , sputum, dyspnea, wheezing, hemoptysis, chest pain   Cardiovascular: negative for chest pain, dyspnea, palpitations, orthopnea, PND   Gastrointestinal: negative for nausea, vomiting, diarrhea, constipation, Dysphagia, hematemesis and hematochezia  +abd pain, resolved   Genitourinary: negative for frequency, dysuria, nocturia, urinary incontinence, and hematuria   Integument: negative for rash, skin lesions, bruises.    Hematologic/Lymphatic: negative for easy bruising, bleeding, lymphadenopathy, or petechiae   Endocrine: negative for heat or cold intolerance,weight changes, change in bowel habits and hair loss   Musculoskeletal: negative for myalgias, arthralgias, pain, joint swelling,and bone pain   Neurological: negative for headaches, dizziness, seizures, weakness, numbness        Physical Exam:    Vitals: BP (!) 145/94   Pulse 68 Temp 97.9 °F (36.6 °C) (Temporal)   Wt 217 lb (98.4 kg)   BMI 43.10 kg/m²   General appearance - well appearing, no in pain or distress.   Patient in electric wheelchair  Mental status - AAO X3  Eyes - pupils equal and reactive, extraocular eye movements intact  Mouth - mucous membranes moist, pharynx normal without lesions  Neck - supple, no significant adenopathy  Lymphatics - no palpable lymphadenopathy, no hepatosplenomegaly  Chest - clear to auscultation, no wheezes, rales or rhonchi, symmetric air entry  Heart - normal rate, regular rhythm, normal S1, S2, no murmurs  Abdomen - soft, nontender, nondistended, no masses or organomegaly  Neurological - alert, oriented, normal speech, no focal findings or movement disorder noted  Extremities - peripheral pulses normal, no pedal edema, no clubbing or cyanosis  Skin - normal coloration and turgor, no rashes, no suspicious skin lesions noted       DATA:    Results for orders placed or performed during the hospital encounter of 01/06/23   Comprehensive Metabolic Panel   Result Value Ref Range    Glucose 371 (H) 70 - 99 mg/dL    BUN 13 6 - 20 mg/dL    Creatinine 1.05 (H) 0.50 - 0.90 mg/dL    Est, Glom Filt Rate >60 >60 mL/min/1.73m2    Calcium 9.1 8.6 - 10.4 mg/dL    Sodium 135 135 - 144 mmol/L    Potassium 4.1 3.7 - 5.3 mmol/L    Chloride 99 98 - 107 mmol/L    CO2 23 20 - 31 mmol/L    Anion Gap 13 9 - 17 mmol/L    Alkaline Phosphatase 177 (H) 35 - 104 U/L    ALT 50 (H) 5 - 33 U/L    AST 47 (H) <32 U/L    Total Bilirubin 0.5 0.3 - 1.2 mg/dL    Total Protein 7.3 6.4 - 8.3 g/dL    Albumin 4.1 3.5 - 5.2 g/dL    Albumin/Globulin Ratio 1.3 1.0 - 2.5   CBC with Auto Differential   Result Value Ref Range    WBC 5.7 3.5 - 11.0 k/uL    RBC 4.30 4.0 - 5.2 m/uL    Hemoglobin 12.8 12.0 - 16.0 g/dL    Hematocrit 38.9 36 - 46 %    MCV 90.5 80 - 100 fL    MCH 29.9 26 - 34 pg    MCHC 33.0 31 - 37 g/dL    RDW 14.1 12.5 - 15.4 %    Platelets 501 807 - 005 k/uL    MPV 8.0 6.0 - 12.0 fL Seg Neutrophils 58 36 - 66 %    Lymphocytes 28 24 - 44 %    Monocytes 5 2 - 11 %    Eosinophils % 8 (H) 1 - 4 %    Basophils 1 0 - 2 %    Segs Absolute 3.30 1.8 - 7.7 k/uL    Absolute Lymph # 1.60 1.0 - 4.8 k/uL    Absolute Mono # 0.30 0.1 - 1.2 k/uL    Absolute Eos # 0.50 (H) 0.0 - 0.4 k/uL    Basophils Absolute 0.00 0.0 - 0.2 k/uL           Impression:  Well-differentiated, grade 1, pancreatic neuroendocrine tumor, clinical stage T2 N0 M0  Elevated gastrin (patient on Protonix)  Multiple comorbidities including diabetes mellitus obesity, renal insufficiency and severe hepatic steatosis  Family history of leukemia  Gave    Plan:  I had a detailed discussion with the patient and we went over results of lab work-up imaging studies and other relevant clinical data  Toxicity check performed. Patient is tolerating treatment without unexpected side effects  Labs are adequate for treatment. We will proceed with treatment   Reiterated treatment plan. Reviewed risk benefit profile and reiterated potential side-effects of ongoing treatment  Will reassess disease status with imaging studies after couple of more treatments. Will reassess resectability  Will give insulin because of hypoglycemia. Patient counseled on achieving better diabetic control  NCCN guidelines were reviewed and discussed with the patient. The diagnosis and care plan were discussed with the patient in detail. I discussed the natural history of the disease, prognosis, risks and goals of therapy and answered all the patients questions to the best of my ability. Patient expressed understanding and was in agreement. Ramo Marie MD            This note is created with the assistance of a speech recognition program.  While intending to generate a document that actually reflects the content of the visit, the document can still have some errors including those of syntax and sound a like substitutions which may escape proof reading.   It such instances, actual meaning can be extrapolated by contextual diversion.

## 2023-01-06 NOTE — PROGRESS NOTES
Patient arrived for somatuline injection. Seen by Dr. Migdalia Nixon today. 6 units reg insulin given per verbal order. Patient left in stable condition. Returns 2/3 for dr and tx.

## 2023-01-09 ENCOUNTER — TELEPHONE (OUTPATIENT)
Dept: ONCOLOGY | Age: 53
End: 2023-01-09

## 2023-01-09 NOTE — TELEPHONE ENCOUNTER
AVS from 1/5/22      Insulin regular 6 unit S/c   Tx today   Rv in 4 weeks       Tx today as scheduled     Rv scheduled for 2/3 @ 10:45 am tx to follow    Pt was given AVS and appointment schedule    Electronically signed by Kilo Carolina on 1/9/2023 at 8:21 AM

## 2023-01-11 ENCOUNTER — TELEPHONE (OUTPATIENT)
Dept: ONCOLOGY | Age: 53
End: 2023-01-11

## 2023-01-11 NOTE — TELEPHONE ENCOUNTER
Name: Tracey Corbett  : 1970  MRN: [id number]    Oncology Navigation Follow-Up Note    Contact Type:  Telephone    Notes: Attempted to contact pt for f/u call, no answer, VM left instructed pt may contact writer prn. Will continue to follow.     Electronically signed by Sarha Weinberg RN on 2023 at 10:14 AM

## 2023-01-18 ENCOUNTER — HOSPITAL ENCOUNTER (OUTPATIENT)
Dept: CT IMAGING | Age: 53
Discharge: HOME | End: 2023-01-20
Payer: MEDICARE

## 2023-01-18 DIAGNOSIS — K86.89 PANCREATIC MASS: ICD-10-CM

## 2023-01-18 PROCEDURE — 74178 CT ABD&PLV WO CNTR FLWD CNTR: CPT

## 2023-01-18 PROCEDURE — 6360000004 HC RX CONTRAST MEDICATION: Performed by: SURGERY

## 2023-01-18 PROCEDURE — 2580000003 HC RX 258: Performed by: SURGERY

## 2023-01-18 RX ORDER — SODIUM CHLORIDE 0.9 % (FLUSH) 0.9 %
10 SYRINGE (ML) INJECTION PRN
Status: DISCONTINUED | OUTPATIENT
Start: 2023-01-18 | End: 2023-01-21 | Stop reason: HOSPADM

## 2023-01-18 RX ORDER — 0.9 % SODIUM CHLORIDE 0.9 %
80 INTRAVENOUS SOLUTION INTRAVENOUS ONCE
Status: DISCONTINUED | OUTPATIENT
Start: 2023-01-18 | End: 2023-01-21 | Stop reason: HOSPADM

## 2023-01-18 RX ADMIN — SODIUM CHLORIDE, PRESERVATIVE FREE 10 ML: 5 INJECTION INTRAVENOUS at 08:43

## 2023-01-18 RX ADMIN — Medication 80 ML: at 08:44

## 2023-01-18 RX ADMIN — IOPAMIDOL 100 ML: 755 INJECTION, SOLUTION INTRAVENOUS at 08:43

## 2023-01-19 ENCOUNTER — TELEPHONE (OUTPATIENT)
Dept: SURGERY | Age: 53
End: 2023-01-19

## 2023-01-19 DIAGNOSIS — K55.1 SUPERIOR MESENTERIC ARTERY STENOSIS (HCC): Primary | ICD-10-CM

## 2023-01-19 NOTE — TELEPHONE ENCOUNTER
Patient called in regards to follow up appointment next week with Dr. Christopher Benjamin. No answer, message left.

## 2023-01-20 ENCOUNTER — TELEPHONE (OUTPATIENT)
Dept: ONCOLOGY | Age: 53
End: 2023-01-20

## 2023-01-20 NOTE — PROGRESS NOTES
[address] [facility]  [address] [place] [id number]  Dept: [phone]                 Clinic History and Physical    Patient:  Tommy Garza  MRN: [id number]    CHIEF COMPLAINT:  had concerns including Results (CT completed 1/18/23). PCP: Tracey Sawyer MD    HISTORY OF PRESENT ILLNESS:   The patient is a 50-year-old female with history of Diabetes, COPD, hypertension, morbid obesity BMI 45 and severe arthritis in lower extremity. She presented to emergency room on 9/20/2022 with right lower quadrant pain radiating from the back she was found to have CHRISTINE and pyelonephritis. Her urine culture was positive for E. coli. She was started on antibiotics. The CT scan that was obtained on admission was performed without IV contrast and was concerning for a 2.5 x 2.3 cm solid-appearing lesion in the pancreatic uncinate. This was followed by MRI which demonstrated a 2.6 x 2.2 x 2.3 hyperenhancing mass in the uncinate concerning for neuroendocrine tumor there was no biliary neither pancreatic duct dilatation. There were many peripancreatic lymph nodes and, with my review, I could recognize another nodule anterior to the pancreatic tail likely to represent a lymph node. Severe hepatic steatosis was also noted. Dr. 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 An 8 mm bleeding sessile polyp was snared and sent for pathology, the duodenum was normal.  EUS of the pancreas demonstrated hypoechoic, homogeneous and solid mass in the pancreatic uncinate measuring 25 x 20 mm. Biopsy demonstrated well diff pNET, Ki67 <3%, mitosis pending. Another FNA biopsy was sent from a nearby lymph node which was negative for pathology. She denies carcinoid syndrome symptoms. her performance status ECOG is 2/3. She use an electric chair for long distance mobility.   She gets short of breath on exertion. She cannot walk more than 2 blocks without stopping due to shortness of breath. She has a family history of leukemia and disseminated cancer without certain primary. She quit smoking 6 years ago. She does not drink alcohol for a year now     Of note gastrin 1374 pg/ml. The gastric polyp came as hyperplastic. On exam she is obese. She has right lower quadrant tenderness. She has a history of tubal ligation and ventral hernia repair. 1/24/2023: She is on lanreotide since November 2022 and she has been tolerating well.she has poor control of her blood glucose. She underwent CT scan on 1/18/2023 pancreas protocol which showed stable hypervascular pancreatic uncinate mass consistent with the known diagnosis of pancreatic neuroendocrine tumor. Of note she has severe proximal SMA stenosis. She denies diarrhea, flushing, rash or weight loss. She feels fatigue. She only takes Protonix once daily. I advised her to take it twice daily. Past Medical History:        Diagnosis Date    Arthritis     Diabetes mellitus ([facility] [address]     Hypertension     Obese        Past Surgical History:        Procedure Laterality Date    HERNIA REPAIR      TUBAL LIGATION      UPPER GASTROINTESTINAL ENDOSCOPY N/A 9/20/2022    EGD W/EUS FNA performed by Hussein Martini MD at [place] [address]  9/20/2022    EGD BIOPSY performed by Hussein Martini MD at [facility]       Medications Prior to Admission:    Prior to Admission medications    Medication Sig Start Date End Date Taking?  Authorizing Provider   pantoprazole (PROTONIX) 40 MG tablet Take 1 tablet by mouth 2 times daily (before meals) 1/24/23  Yes Kristina Mayo MD   glipiZIDE (GLUCOTROL) 5 MG tablet Take 1 tablet by mouth daily 11/11/22  Yes Winter Dinero MD   levothyroxine (SYNTHROID) 100 MCG tablet Take 100 mcg by mouth Daily   Yes Historical Provider, MD   pantoprazole (PROTONIX) 40 MG tablet Take 1 tablet by mouth every morning (before breakfast) 9/23/22  Yes Dl Philip, DO   docusate sodium (COLACE) 100 MG capsule Take 1 capsule by mouth 2 times daily 11/3/17  Yes PATRICIA Rivera CNP   simvastatin (ZOCOR) 40 MG tablet Take 40 mg by mouth nightly   Yes Historical Provider, MD   acetaminophen (TYLENOL) 325 MG tablet Take 650 mg by mouth every 6 hours as needed for Pain   Yes Historical Provider, MD   benzonatate (TESSALON) 100 MG capsule Take 1 capsule by mouth 3 times daily as needed for Cough 5/4/15  Yes Zachary Nolasco, DO   LISINOPRIL PO Take 20 mg by mouth daily. Yes Historical Provider, MD   glipiZIDE (GLUCOTROL) 5 MG tablet Take 1 tablet by mouth 2 times daily 12/9/22   Graciela Saini MD   meloxicam [facility]) 15 MG tablet Take 1 tablet by mouth daily 7/13/22 9/22/22  Jennie Macias, DO   lisinopril-hydrochlorothiazide (PRINZIDE;ZESTORETIC) 20-12.5 MG per tablet Take 1 tablet by mouth daily  9/22/22  Historical Provider, MD       Allergies:  Codeine and Percocet [oxycodone-acetaminophen]    Social History:   TOBACCO:   reports that she quit smoking about 6 years ago. Her smoking use included cigarettes. She has never used smokeless tobacco.  ETOH:   reports no history of alcohol use. Family History:   She has a family history of leukemia and disseminated cancer without certain primary. REVIEW OF SYSTEMS:  Constitutional:  Positive for fatigue. Negative for chills, fever and unexpected weight change. Eyes:  Negative for visual disturbance. Respiratory:  Negative for cough, chest tightness, shortness of breath and wheezing. Cardiovascular:  Negative for chest pain, palpitations and leg swelling. Gastrointestinal:  Negative for abdominal distention, abdominal pain, blood in stool, constipation, diarrhea, nausea and vomiting. Genitourinary:  Negative for dysuria, hematuria and urgency. Musculoskeletal:  Positive for back pain. Skin:  Negative for rash.    Neurological:  Negative for dizziness, seizures, syncope, speech difficulty, weakness, light-headedness, numbness and headaches.   Hematological:  Negative for adenopathy. Does not bruise/bleed easily.   Psychiatric/Behavioral:  The patient is not nervous/anxious.      Physical Exam:    Vitals: BP (!) 150/90 (Site: Right Lower Arm, Position: Sitting, Cuff Size: Medium Adult)   Pulse 71   Ht 4' 11.5\" (1.511 m)   Wt 214 lb 6.4 oz (97.3 kg)   BMI 42.58 kg/m²   General appearance: alert, appears stated age and cooperative  Skin: Skin color, texture, turgor normal. No rashes or lesions  HEENT: Head: Normocephalic, no lesions, without obvious abnormality.  Neck: no adenopathy, no carotid bruit, no JVD, supple, symmetrical, trachea midline, and thyroid not enlarged, symmetric, no tenderness/mass/nodules  Lungs: Breathing comfortably  Heart: RRR  Abdomen: Soft, obese, nontender  Extremities: extremities normal, atraumatic, no cyanosis or edema  Neurologic: Mental status: Alert, oriented, thought content appropriate    CBC:   Lab Results   Component Value Date    WBC 5.7 01/06/2023    HGB 12.8 01/06/2023    HCT 38.9 01/06/2023    MCV 90.5 01/06/2023     01/06/2023     BMP:    Lab Results   Component Value Date/Time     01/06/2023 08:36 AM    K 4.1 01/06/2023 08:36 AM    CL 99 01/06/2023 08:36 AM    CO2 23 01/06/2023 08:36 AM    BUN 13 01/06/2023 08:36 AM    CREATININE 1.05 01/06/2023 08:36 AM    GLUCOSE 371 01/06/2023 08:36 AM    GLUCOSE 104 05/07/2012 10:45 AM    CALCIUM 9.1 01/06/2023 08:36 AM      Hepatic:   Lab Results   Component Value Date/Time    ALKPHOS 177 01/06/2023 08:36 AM    ALT 50 01/06/2023 08:36 AM    AST 47 01/06/2023 08:36 AM    PROT 7.3 01/06/2023 08:36 AM    BILITOT 0.5 01/06/2023 08:36 AM    LABALBU 4.1 01/06/2023 08:36 AM    LABALBU 4.4 05/07/2012 10:45 AM      Troponin:   Lab Results   Component Value Date    TROPONINI 0.02 12/03/2014      BNP: No results found for: BNP   INR:   Lab Results   Component Value Date    INR 1.0  09/20/2022    PROTIME 10.4 09/20/2022     Endoscopy   EGD 9/19/2022[de-identified]   Esophagus: normal.   Stomach: Stomach had mild to moderate gastric erythema in the antrum and possible GA VE. This was biopsied with cold biopsy forceps. There was a 8 mm sessile polyp just prior to the pylorus which had mild oozing of blood. This was snared with cold snare and clipped with 1 metal resolution clip with complete control of bleeding. The rest of the stomach was otherwise normal.  Duodenum: normal     EUS 9/19/2022:  Pancreas: A round mass was identified in the pancreatic head-near the uncinate process. The mass was hypoechoic, homogeneous and solid. The mass measured 25 mm by 20 mm in maximal cross-sectional diameter. The endosonographic borders were well-defined. Fine needle biopsy was performed. Color Doppler imaging was utilized prior to needle puncture to confirm a lack of significant vascular structures within the needle path. 2 passes were made with the 22 gauge ultrasound biopsy needle using a transduodenal approach. A stylet was used. Estimated blood loss was minimal. Verification of patient identification for the specimen was done by the physician and nurse using the patient's name and medical record number. The rest of the pancreas was otherwise normal. Normal PD. No signs of chronic pancreatitis. CBD: Normal, no stones, sludge. CBD diameter:  3 mm. Ampulla: Normal.  Left lobe of liver: normal.   Lymphadenopathy: 1 hypoechoic lymph node was visualized in the peripancreatic area which measured 7 mm x 6 mm in diameter, round in shape with well-defined borders. Fine-needle biopsy of this lymph node was performed with 22-gauge shark core needle via transduodenal route. 2 passes were made. Imaging   DOTATATE Scan 10/5/2022  DOTATATE avid mass at the pancreatic head consistent with history of   carcinoid.      CT Result (most recent):  CT ABDOMEN PELVIS W WO IV CONTRAST 01/18/2023    Narrative  EXAMINATION:  CT OF THE ABDOMEN AND PELVIS WITH AND WITHOUT CONTRAST 1/18/2023 8:41 am    TECHNIQUE:  CT of the abdomen and pelvis was performed with and without the  administration of intravenous contrast.  Multiplanar reformatted images are  provided for review. Automated exposure control, iterative reconstruction,  and/or weight based adjustment of the mA/kV was utilized to reduce the  radiation dose to as low as reasonably achievable. COMPARISON:  MRI 09/20/2022, PET-CT 10/05/2022    HISTORY:  ORDERING SYSTEM PROVIDED HISTORY: Pancreatic mass  TECHNOLOGIST PROVIDED HISTORY:    STAT Creatinine as needed:->Yes  Dr. Amaris Daly protocol  Reason for Exam:  Pancreatic mass    FINDINGS:  Lower Chest: Lung bases are clear. No effusion. Organs: The mass mass at the posterior aspect of the pancreatic head measures  approximately 2.7 x 2.2 cm in axial dimensions. Density follows blood pool  on all phases. Diffusely decreased attenuation of the liver. Gallbladder,  spleen, kidneys and adrenals appear within normal limits. GI/Bowel: No abnormal dilatation or appreciable wall thickening. Normal  appendix. Pelvis: Urinary bladder within normal limits. Uterus and adnexa unremarkable  for CT technique. Peritoneum/Retroperitoneum: No free fluid. No retroperitoneal adenopathy. Abdominal aorta is normal in caliber. Celiac artery is widely patent. Severe focal stenosis of the proximal SMA. Bilateral renal arteries appear  widely patent. The GENEVIEVE is patent. Iliac arteries are widely patent. Bones/Soft Tissues: No acute soft tissue abnormality identified. No  aggressive osseous lesions. Impression  Highly vascular pancreatic mass of the uncinate process compatible with  neuroendocrine tumor measuring approximately 2.7 x 2.2 cm, not significantly  changed. Severe focal stenosis of the proximal SMA secondary to mural thrombus or  noncalcified atherosclerotic disease.   Maximum stenosis estimated at greater  than 90%. Hepatic steatosis. CT ABDOMEN PELVIS WO IV CONTRAST 09/19/2022    Narrative  EXAMINATION:  CT OF THE ABDOMEN AND PELVIS WITHOUT CONTRAST 9/19/2022 6:07 pm    TECHNIQUE:  CT of the abdomen and pelvis was performed without the administration of  intravenous contrast. Multiplanar reformatted images are provided for review. Automated exposure control, iterative reconstruction, and/or weight based  adjustment of the mA/kV was utilized to reduce the radiation dose to as low  as reasonably achievable. COMPARISON:  04/27/2016    HISTORY:  ORDERING SYSTEM PROVIDED HISTORY: ABDMONIAL PAIN  TECHNOLOGIST PROVIDED HISTORY:  ABDMONIAL PAIN    Decision Support Exception - unselect if not a suspected or confirmed  emergency medical condition->Emergency Medical Condition (MA)  Is the patient pregnant?->No    FINDINGS:  Lower Chest: Subsegmental atelectasis in the lung bases. Coronary artery  atherosclerosis. Organs: Liver is diffusely hypoattenuating with fatty sparing along the  gallbladder fossa. Within the limitations of a noncontrast examination, no  acute abnormality within the spleen, gallbladder, pancreas, or adrenal  glands. There is a 2.5 x 2.3 cm solid-appearing lesion in the pancreatic  head. No nephrolithiasis or hydronephrosis. GI/Bowel: Stomach is nondistended. The small bowel is nondilated. The  appendix is normal in caliber. The colon is nondilated. Pelvis: The bladder is nondistended. No vesicular stone. The uterus is  present. Peritoneum/Retroperitoneum: No ascites or pneumoperitoneum. Abdominal aorta  is normal in caliber. There is a retroaortic left renal vein. There are  shotty mesenteric and retroperitoneal lymph nodes. There is a mildly  enlarged periportal node. Bones/Soft Tissues: Degenerative changes of the lower lumbar spine. Impression  1. Hepatic steatosis. 2. Solid lesion in the pancreatic head is indeterminate. Pancreatic protocol  MRI or CT is recommended for further evaluation. 3. Mildly enlarged periportal lymph node. MRI Result (most recent):  MRI ABDOMEN W WO CONTRAST MRCP 09/20/2022    Narrative  EXAMINATION:  MRI OF THE ABDOMEN WITH AND WITHOUT CONTRAST AND MRCP    9/20/2022 8:10 am    TECHNIQUE:  Multiplanar multisequence MRI of the abdomen was performed with and without  the administration of intravenous contrast.    COMPARISON:  Abdomen and pelvis CTs dating to 04/04/2013    HISTORY:  ORDERING SYSTEM PROVIDED HISTORY: eval panc mass, abdominal pain, fever  TECHNOLOGIST PROVIDED HISTORY:  eval panc mass, abdominal pain, fever  What is the sedation requirement?->None  Reason for Exam: eval panc mass, abdominal pain, fever    FINDINGS:  Patient motion in some areas, partially limiting evaluation of those areas. LOWER CHEST:  [place] dependent atelectasis in the bilateral lower lobes on  some sequences. Normal heart size. No pleural nor pericardial effusions. LIVER:  Mildly enlarged with the right hemiliver possibly accentuated by a  normal variant Riedel's lobe. Severe steatosis with no findings of cirrhosis. GALLBLADDER/BILE DUCTS:  Normal gallbladder. No intrahepatic nor  extrahepatic biliary dilation. No obvious ductal filling defects nor  strictures. PANCREAS:  Typical duct configuration without dilation. No obvious ductal  filling defects nor strictures. Mildly atrophic parenchyma. 2.6 cm x 2.2 cm  x 2.3 cm T2 hyperintense, T1 intermediate lesion in the uncinate process with  diffuse hyperenhancement on all postcontrast sequences and no significant  diffusion restriction. SPLEEN:  Normal.    ADRENAL GLANDS:  Normal.    KIDNEYS:  Bilateral renal peripelvic cysts, more prominent on the left. GI/BOWEL:  Normal course and caliber of the stomach, small bowel, and colon  without obstruction. Normal appendix with retrocecal location.     PELVIS:  Not included. PERITONEUM/RETROPERITONEUM:  Long-term stability of mildly enlarged  portohepatic and portocaval lymph nodes, likely reactive. Long-term  stability of nonenlarged to mildly enlarged mesenteric nodes with subtle  mesenteric stranding, also likely reactive though the appearance could be  seen with mesenteric panniculitis or other etiologies. No free  intraperitoneal fluid. VESSELS:  Typical arterial anatomy. Patent veins. SOFT TISSUES/BONES:  Laxity of the anterior and lateral abdominal wall  musculature with diastasis recti. No suspicious marrow lesions. Multilevel  degenerative disc disease. Impression  1. A 2.6 cm lesion arising from the pancreatic uncinate process has features  highly suggestive of a neuroendocrine neoplasm. Consider endoscopic biopsy  and serum biochemical evaluation. 2. No findings of metastatic disease in the abdomen. 3. Severe hepatic steatosis. Pathology     Surgical Pathology Report   Date Value Ref Range Status   09/20/2022       -- Diagnosis --  A. STOMACH, BIOPSIES:  - MILD TO MODERATE CHRONIC ACTIVE GASTRITIS.  - HELICOBACTER PYLORI IMMUNOSTAIN (CONTROL APPROPRIATE) IS NEGATIVE  FOR   INFECTIOUS BACTERIA. B.  STOMACH, POLYPECTOMY:  - BENIGN HYPERPLASTIC GASTRIC POLYP WITH MARKED ACUTE AND CHRONIC   INFLAMMATION AND FOCAL SURFACE EROSION/ULCERATION. - NEGATIVE FOR HELICOBACTER PYLORI INFECTION. Wni Raines. Leila Heredia M.D.  **Electronically Signed Out**         sls/9/21/2022       Clinical Information  Pre-op Diagnosis:  GERD   Operative Findings:  GASTRIC BX; GASTRIC POLYP   Operation Performed:  EGD W/EUS FAN, EGD BIOPSY     Source of Specimen  A: GASTRIC BX  B: GASTRIC BX    Gross Description  A. \"FELIX YOUNGBLOOD, GASTRIC BX\" Three tan-white tissue fragments from  0.3 to 0.4 cm and are 0.7 x 0.3 x 0.2 cm in aggregate. Entirely 1cs. B. \"FELIX YOUNGBLOOD, GASTRIC POLYP\" 0.6 x 0.5 x 0.3 cm polypoid portion  of tan-red tissue, inked and bisected. Entirely 1cs. ep tm      Microscopic Description  A, B. Microscopic examination performed. SURGICAL PATHOLOGY CONSULTATION       Patient Name: Bella Penn: [id number]  Path Number: [id number]    [facility] [address] [facility], [address] [facility] [place]  [phone]  Fax: [phone]        A. FINE NEEDLE ASPIRATION EUS KATHERINE PANCREATIC LYMPH NODE:           NEGATIVE FOR MALIGNANCY. B.          FINE NEEDLE ASPIRATION EUS PANCREATIC HEAD MASS:          CELLULAR FINDINGS HIGHLY SUSPICIOUS FOR WELL-DIFFERENTIATED   NEUROENDOCRINE TUMOR   A Ki-67 immunostain performed part B shows a proliferation rate of   less than 3%, compatible with a well-differentiated neuroendocrine   tumor, grade 1. Assessment and Plan   Primary pancreatic neuroendocrine tumor  This is a 57-year-old female with multiple comorbidities presenting with pyelonephritis and newly diagnosed pancreatic uncinate mass that was biopsied and showed well differentiated neuroendocrine tumor, Ki-67 was less than 3%. I had a long discussion with the patient about her diagnosis both when she was admitted to the hospital and today in the clinic. She underwent DOTATATE scan which demonstrated no extrapancreatic metastatic disease. There is a small lesion anterior to the body of the pancreas that is avid and concerning. I have discussed that in general the treatment entails Whipple procedure, however, given her performance status, recent diagnosis of renal failure and pyelonephritis, limited mobility, obesity, COPD and shortness of breath on exertion, I am concerned that proceeding with aggressive surgery as Whipple procedure will have unfortunate outcomes in her situation. She is a high risk for surgery using the ACS NSQIP calculator.   Although this is not always accurate for patients with malignancy and we tend to use ECOG PS in lieu, it gives a good insight about her morbidity and mortality from surgical intervention. We have discussed her case in tumor board as well as I discussed her case with respected colleagues and [facility] surgery specialty and the decision is to treat her disease medically using long-acting somatostatin agonist.  I will place a referral for her to see  for discussion of further treatment. I will see her back in the office in 3 months for repeat CT scan pancreas protocol. I have noted that her gastrin level is elevated, hence she should continue to be on Protonix twice daily. We will send a test for secretin stimulation gastrin level. Thank you for the opportunity to participate in the care of Sujatha Hui. Sujatha Hui has my contacts and was encouraged to call me with any questions about what we discussed today. Clinical Stage: cT2 N0 M0    1/24/2023: She continues to have poor performance status. She is not a candidate for surgery. I will follow her up in 3 months with dotatate scan, gastrin level and labs. She should take Protonix 40 mg twice daily due to elevated gastrin levels. 2.  Severe SMA stenosis  She gets cramps after she eats. I have placed a referral to Dr. Stef Salazar for management. She should schedule her appointment with him as soon as possible. 3.  Poorly controlled diabetes  She needs to follow-up with Dr. Carmine Butt for intensive controlling of her blood sugar. Last check was 371. This is an unfortunate known side effect for somatostatin agonists.     Patient Active Problem List   Diagnosis Code    Acute pyelonephritis N10    Diabetes mellitus type 2 in obese (HCC) E11.69, E66.9    Pancreatic mass K86.89    Generalized abdominal pain R10.84    E. coli UTI N39.0, B96.20    Hyponatremia E87.1    High anion gap metabolic acidosis W25.21    Renal insufficiency N28.9    Abdominal pain R10.9    Primary pancreatic neuroendocrine tumor D3A.8    Neuroendocrine tumor D3A.8       Mike Castillo was seen today for results. Diagnoses and all orders for this visit:    Primary pancreatic neuroendocrine tumor  -     PET CT TUMOR IMAGE SKULL THIGH DOTATATE ; Future  -     CBC with Auto Differential; Future  -     Comprehensive Metabolic Panel; Future  -     Prealbumin; Future  -     Protime-INR; Future  -     Phosphorus; Future  -     Magnesium; Future  -     Gastrin; Future    Declining performance status    Poorly controlled diabetes mellitus (HCC)    Elevated gastrin level    Superior mesenteric artery stenosis (HCC)    Other orders  -     pantoprazole (PROTONIX) 40 MG tablet; Take 1 tablet by mouth 2 times daily (before meals)       Kristina Mayo MD [facility]  Surgical Oncology/[facility] Surgery  [facility]  [address]  [facility] [address]  Office:  [phone]  Fax:  [phone]  01/24/23   10:27 AM     CC: Jeannie Marquez MD  CC: Remy Santana MD  CC: Fitz Freire MD

## 2023-01-20 NOTE — TELEPHONE ENCOUNTER
Name: Jody Murguia  : 1970  MRN: [id number]    Oncology Navigation Follow-Up Note    Contact Type:  Medical Oncology    Notes: Dr. Linda Pfeiffer updated on CT results. Notified Dr. Linda Pfeiffer pt scheduled for Dr. Hannah Amos f/u  & Dr. Hannah Amos placed referral to Dr. Carly Diaz. Will continue to follow.     Electronically signed by Shayan Ritchie RN on 2023 at 3:23 PM

## 2023-01-20 NOTE — PROGRESS NOTES
[facility] [facility]  [address] [facility] [address]  Dept: [phone]                 Clinic History and Physical    Patient:  Cookie Gudino  MRN: [id number]    CHIEF COMPLAINT:  had concerns including New Patient (Primary pancreatic neuroendocrine tumor). PCP: Dalton Gudino MD    HISTORY OF PRESENT ILLNESS:   The patient is a 58-year-old female with history of Diabetes, COPD, hypertension, morbid obesity BMI 45 and severe arthritis in lower extremity. She presented to emergency room on 9/20/2022 with right lower quadrant pain radiating from the back she was found to have CHRISTINE and pyelonephritis. Her urine culture was positive for E. coli. She was started on antibiotics. The CT scan that was obtained on admission was performed without IV contrast and was concerning for a 2.5 x 2.3 cm solid-appearing lesion in the pancreatic uncinate. This was followed by MRI which demonstrated a 2.6 x 2.2 x 2.3 hyperenhancing mass in the uncinate concerning for neuroendocrine tumor there was no biliary neither pancreatic duct dilatation. There were many peripancreatic lymph nodes and, with my review, I could recognize another nodule anterior to the pancreatic tail likely to represent a lymph node. Severe hepatic steatosis was also noted. Dr. 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 An 8 mm bleeding sessile polyp was snared and sent for pathology, the duodenum was normal.  EUS of the pancreas demonstrated hypoechoic, homogeneous and solid mass in the pancreatic uncinate measuring 25 x 20 mm. Biopsy demonstrated well diff pNET, Ki67 <3%, mitosis pending. Another FNA biopsy was sent from a nearby lymph node which was negative for pathology. She denies carcinoid syndrome symptoms. her performance status ECOG is 2/3. She use an electric chair for long distance mobility. She gets short of breath on exertion. She cannot walk more than 2 blocks without stopping due to shortness of breath. She has a family history of leukemia and disseminated cancer without certain primary. She quit smoking 6 years ago. She does not drink alcohol for a year now     Of note gastrin 1374 pg/ml. The gastric polyp came as hyperplastic. On exam she is obese. She has right lower quadrant tenderness. She has a history of tubal ligation and ventral hernia repair. Oncology History    No history exists. Past Medical History:        Diagnosis Date    Arthritis     Diabetes mellitus ([address]     Hypertension     Obese        Past Surgical History:        Procedure Laterality Date    HERNIA REPAIR      TUBAL LIGATION      UPPER GASTROINTESTINAL ENDOSCOPY N/A 9/20/2022    EGD W/EUS FNA performed by Alonzo Vicente MD at [place] [address]  9/20/2022    EGD BIOPSY performed by Alonzo Vicente MD at [facility]       Medications Prior to Admission:    Prior to Admission medications    Medication Sig Start Date End Date Taking? Authorizing Provider   pantoprazole (PROTONIX) 40 MG tablet Take 1 tablet by mouth every morning (before breakfast) 9/23/22  Yes Dl Philip, DO   simvastatin (ZOCOR) 40 MG tablet Take 40 mg by mouth nightly   Yes Historical Provider, MD   acetaminophen (TYLENOL) 325 MG tablet Take 650 mg by mouth every 6 hours as needed for Pain   Yes Historical Provider, MD   ondansetron (ZOFRAN) 4 MG tablet Take 1 tablet by mouth every 8 hours as needed for Nausea 5/22/17  Yes Nathaniel Ko, DO   benzonatate (TESSALON) 100 MG capsule Take 1 capsule by mouth 3 times daily as needed for Cough 5/4/15  Yes Salma Raygoza, DO   LISINOPRIL PO Take 20 mg by mouth daily.    Yes Historical Provider, MD   meloxicam (MOBIC) 15 MG tablet Take 1 tablet by mouth daily 7/13/22 9/22/22  Mague Rater, DO   docusate sodium (COLACE) 100 MG capsule Take 1 capsule by mouth 2 times daily  Patient not taking: Reported on 10/18/2022 11/3/17   Mulugeta Mckeon, APRN - CNP   lisinopril-hydrochlorothiazide (PRINZIDE;ZESTORETIC) 20-12.5 MG per tablet Take 1 tablet by mouth daily  9/22/22  Historical Provider, MD   METFORMIN HCL PO Take 1,000 mg by mouth 2 times daily. Patient not taking: Reported on 10/18/2022    Historical Provider, MD       Allergies:  Codeine and Percocet [oxycodone-acetaminophen]    Social History:   TOBACCO:   reports that she quit smoking about 5 years ago. Her smoking use included cigarettes. She has never used smokeless tobacco.  ETOH:   reports no history of alcohol use. Family History:   She has a family history of leukemia and disseminated cancer without certain primary. REVIEW OF SYSTEMS:  Review of Systems   Constitutional:  Negative for chills, fatigue, fever and unexpected weight change. Eyes:  Negative for visual disturbance. Respiratory:  Negative for cough, chest tightness, shortness of breath and wheezing. Cardiovascular:  Negative for chest pain, palpitations and leg swelling. Gastrointestinal:  Negative for abdominal distention, abdominal pain, blood in stool, constipation, diarrhea, nausea and vomiting. Genitourinary:  Negative for dysuria, hematuria and urgency. Musculoskeletal:  Positive for back pain. Skin:  Negative for rash. Neurological:  Negative for dizziness, seizures, syncope, speech difficulty, weakness, light-headedness, numbness and headaches. Hematological:  Negative for adenopathy. Does not bruise/bleed easily. Psychiatric/Behavioral:  The patient is not nervous/anxious.       Physical Exam:    Vitals: BP (!) 140/93 (Site: Left Upper Arm, Position: Sitting, Cuff Size: Large Adult)   Pulse 92   Ht 4' 11.5\" (1.511 m)   Wt 223 lb 12.8 oz (101.5 kg)   BMI 44.45 kg/m²   General appearance: alert, appears stated age and cooperative  Skin: Skin color, texture, turgor normal. No rashes or lesions  HEENT: Head: Normocephalic, no lesions, without obvious abnormality. Neck: no adenopathy, no carotid bruit, no JVD, supple, symmetrical, trachea midline, and thyroid not enlarged, symmetric, no tenderness/mass/nodules  Lungs: Breathing comfortably  Heart: RRR  Abdomen: Soft, obese  Extremities: extremities normal, atraumatic, no cyanosis or edema  Neurologic: Mental status: Alert, oriented, thought content appropriate    CBC:   Lab Results   Component Value Date    WBC 9.2 09/19/2022    HGB 12.0 09/19/2022    HCT 34.5 (L) 09/19/2022    MCV 90.8 09/19/2022     09/19/2022     BMP:    Lab Results   Component Value Date/Time     09/20/2022 04:31 AM    K 3.6 09/20/2022 04:31 AM     09/20/2022 04:31 AM    CO2 20 09/20/2022 04:31 AM    BUN 23 09/20/2022 04:31 AM    CREATININE 1.33 09/20/2022 04:31 AM    GLUCOSE 198 09/20/2022 04:31 AM    GLUCOSE 104 05/07/2012 10:45 AM    CALCIUM 7.9 09/20/2022 04:31 AM      Hepatic:   Lab Results   Component Value Date/Time    ALKPHOS 106 09/20/2022 04:31 AM    ALT 30 09/20/2022 04:31 AM    AST 26 09/20/2022 04:31 AM    PROT 6.7 09/20/2022 04:31 AM    BILITOT 0.5 09/20/2022 04:31 AM    LABALBU 3.3 09/20/2022 04:31 AM    LABALBU 4.4 05/07/2012 10:45 AM      Troponin:   Lab Results   Component Value Date    TROPONINI 0.02 12/03/2014      BNP: No results found for: BNP   INR:   Lab Results   Component Value Date    INR 1.0 09/20/2022    PROTIME 10.4 09/20/2022     Endoscopy   EGD 9/19/2022[de-identified]   Esophagus: normal.   Stomach: Stomach had mild to moderate gastric erythema in the antrum and possible GA VE. This was biopsied with cold biopsy forceps. There was a 8 mm sessile polyp just prior to the pylorus which had mild oozing of blood. This was snared with cold snare and clipped with 1 metal resolution clip with complete control of bleeding.   The rest of the stomach was otherwise normal.  Duodenum: normal     EUS 9/19/2022:  Pancreas: A round mass was identified in the pancreatic head-near the uncinate process. The mass was hypoechoic, homogeneous and solid. The mass measured 25 mm by 20 mm in maximal cross-sectional diameter. The endosonographic borders were well-defined. Fine needle biopsy was performed. Color Doppler imaging was utilized prior to needle puncture to confirm a lack of significant vascular structures within the needle path. 2 passes were made with the 22 gauge ultrasound biopsy needle using a transduodenal approach. A stylet was used. Estimated blood loss was minimal. Verification of patient identification for the specimen was done by the physician and nurse using the patient's name and medical record number. The rest of the pancreas was otherwise normal. Normal PD. No signs of chronic pancreatitis. CBD: Normal, no stones, sludge. CBD diameter:  3 mm. Ampulla: Normal.  Left lobe of liver: normal.   Lymphadenopathy: 1 hypoechoic lymph node was visualized in the peripancreatic area which measured 7 mm x 6 mm in diameter, round in shape with well-defined borders. Fine-needle biopsy of this lymph node was performed with 22-gauge shark core needle via transduodenal route. 2 passes were made. Imaging   DOTATATE Scan 10/5/2022  DOTATATE avid mass at the pancreatic head consistent with history of   carcinoid. CT Result (most recent):  CT ABDOMEN PELVIS WO IV CONTRAST 09/19/2022    Narrative  EXAMINATION:  CT OF THE ABDOMEN AND PELVIS WITHOUT CONTRAST 9/19/2022 6:07 pm    TECHNIQUE:  CT of the abdomen and pelvis was performed without the administration of  intravenous contrast. Multiplanar reformatted images are provided for review. Automated exposure control, iterative reconstruction, and/or weight based  adjustment of the mA/kV was utilized to reduce the radiation dose to as low  as reasonably achievable.     COMPARISON:  04/27/2016    HISTORY:  ORDERING SYSTEM PROVIDED HISTORY: ABDMONIAL PAIN  TECHNOLOGIST PROVIDED HISTORY:  ABDMONIAL PAIN    Decision Support Exception - unselect if not a suspected or confirmed  emergency medical condition->Emergency Medical Condition (MA)  Is the patient pregnant?->No    FINDINGS:  Lower Chest: Subsegmental atelectasis in the lung bases. Coronary artery  atherosclerosis. Organs: Liver is diffusely hypoattenuating with fatty sparing along the  gallbladder fossa. Within the limitations of a noncontrast examination, no  acute abnormality within the spleen, gallbladder, pancreas, or adrenal  glands. There is a 2.5 x 2.3 cm solid-appearing lesion in the pancreatic  head. No nephrolithiasis or hydronephrosis. GI/Bowel: Stomach is nondistended. The small bowel is nondilated. The  appendix is normal in caliber. The colon is nondilated. Pelvis: The bladder is nondistended. No vesicular stone. The uterus is  present. Peritoneum/Retroperitoneum: No ascites or pneumoperitoneum. Abdominal aorta  is normal in caliber. There is a retroaortic left renal vein. There are  shotty mesenteric and retroperitoneal lymph nodes. There is a mildly  enlarged periportal node. Bones/Soft Tissues: Degenerative changes of the lower lumbar spine. Impression  1. Hepatic steatosis. 2. Solid lesion in the pancreatic head is indeterminate. Pancreatic protocol  MRI or CT is recommended for further evaluation. 3. Mildly enlarged periportal lymph node. CT ABDOMEN PELVIS WO IV CONTRAST 04/27/2016    Narrative  FINAL REPORT    EXAM:  CT ABDOMEN PELVIS WO IV CONTRAST    HISTORY:  concern of appendicitis, history of hernia repair    TECHNIQUE:  Spiral multi slice axial acquisition was obtained through the abdomen and pelvis without intravenous or oral contrast administration. Axial, coronal and sagittal images were reconstructed    PRIORS:  04/04/2013. FINDINGS:  The lung bases are free of infiltrates. There is diffuse fatty infiltration in the liver. This is unchanged.     The spleen, pancreas, gallbladder, adrenal glands and stomach are unremarkable. Both kidneys are normal in size. There is no hydronephrosis. There are no renal calculi or renal masses seen. The ureters are not dilated. The bowel is not dilated. There is no bowel wall thickening or mesenteric edema seen. The appendix is visualized and appears normal in size. There is no retroperitoneal adenopathy. The aorta is normal in size. The uterus appears normal in size. The ovaries are not enlarged. The bladder and rectum are unremarkable. The osseous structures are all grossly maintained. Impression:  Fatty infiltration in the liver. No other acute abdominal or pelvic findings      Electronically Signed By: Paul You   on  04/27/2016  17:51       MRI Result (most recent):  MRI ABDOMEN W WO CONTRAST MRCP 09/20/2022    Narrative  EXAMINATION:  MRI OF THE ABDOMEN WITH AND WITHOUT CONTRAST AND MRCP    9/20/2022 8:10 am    TECHNIQUE:  Multiplanar multisequence MRI of the abdomen was performed with and without  the administration of intravenous contrast.    COMPARISON:  Abdomen and pelvis CTs dating to 04/04/2013    HISTORY:  ORDERING SYSTEM PROVIDED HISTORY: eval panc mass, abdominal pain, fever  TECHNOLOGIST PROVIDED HISTORY:  eval panc mass, abdominal pain, fever  What is the sedation requirement?->None  Reason for Exam: eval panc mass, abdominal pain, fever    FINDINGS:  Patient motion in some areas, partially limiting evaluation of those areas. LOWER CHEST:  [place] dependent atelectasis in the bilateral lower lobes on  some sequences. Normal heart size. No pleural nor pericardial effusions. LIVER:  Mildly enlarged with the right hemiliver possibly accentuated by a  normal variant Riedel's lobe. Severe steatosis with no findings of cirrhosis. GALLBLADDER/BILE DUCTS:  Normal gallbladder. No intrahepatic nor  extrahepatic biliary dilation. No obvious ductal filling defects nor  strictures.     PANCREAS:  Typical duct configuration without dilation. No obvious ductal  filling defects nor strictures. Mildly atrophic parenchyma. 2.6 cm x 2.2 cm  x 2.3 cm T2 hyperintense, T1 intermediate lesion in the uncinate process with  diffuse hyperenhancement on all postcontrast sequences and no significant  diffusion restriction. SPLEEN:  Normal.    ADRENAL GLANDS:  Normal.    KIDNEYS:  Bilateral renal peripelvic cysts, more prominent on the left. GI/BOWEL:  Normal course and caliber of the stomach, small bowel, and colon  without obstruction. Normal appendix with retrocecal location. PELVIS:  Not included. PERITONEUM/RETROPERITONEUM:  Long-term stability of mildly enlarged  portohepatic and portocaval lymph nodes, likely reactive. Long-term  stability of nonenlarged to mildly enlarged mesenteric nodes with subtle  mesenteric stranding, also likely reactive though the appearance could be  seen with mesenteric panniculitis or other etiologies. No free  intraperitoneal fluid. VESSELS:  Typical arterial anatomy. Patent veins. SOFT TISSUES/BONES:  Laxity of the anterior and lateral abdominal wall  musculature with diastasis recti. No suspicious marrow lesions. Multilevel  degenerative disc disease. Impression  1. A 2.6 cm lesion arising from the pancreatic uncinate process has features  highly suggestive of a neuroendocrine neoplasm. Consider endoscopic biopsy  and serum biochemical evaluation. 2. No findings of metastatic disease in the abdomen. 3. Severe hepatic steatosis. Pathology     Surgical Pathology Report   Date Value Ref Range Status   09/20/2022       -- Diagnosis --  A. STOMACH, BIOPSIES:  - MILD TO MODERATE CHRONIC ACTIVE GASTRITIS.  - HELICOBACTER PYLORI IMMUNOSTAIN (CONTROL APPROPRIATE) IS NEGATIVE  FOR   INFECTIOUS BACTERIA. B.  STOMACH, POLYPECTOMY:  - BENIGN HYPERPLASTIC GASTRIC POLYP WITH MARKED ACUTE AND CHRONIC   INFLAMMATION AND FOCAL SURFACE EROSION/ULCERATION.   - NEGATIVE FOR HELICOBACTER PYLORI INFECTION. Colin Whyte. Chris Reyes M.D.  **Electronically Signed Out**         sls/9/21/2022       Clinical Information  Pre-op Diagnosis:  GERD   Operative Findings:  GASTRIC BX; GASTRIC POLYP   Operation Performed:  EGD W/EUS FAN, EGD BIOPSY     Source of Specimen  A: GASTRIC BX  B: GASTRIC BX    Gross Description  A. \"FELIX YOUNGBLOOD, GASTRIC BX\" Three tan-white tissue fragments from  0.3 to 0.4 cm and are 0.7 x 0.3 x 0.2 cm in aggregate. Entirely 1cs. B. \"FELIX YOUNGBLOOD, GASTRIC POLYP\" 0.6 x 0.5 x 0.3 cm polypoid portion  of tan-red tissue, inked and bisected. Entirely 1cs. ep tm      Microscopic Description  A, B. Microscopic examination performed. SURGICAL PATHOLOGY CONSULTATION       Patient Name: Emi Anne: [id number]  Path Number: [id number]    [facility] [address] [facility], [address]  [phone]  Fax: [phone]        A. FINE NEEDLE ASPIRATION EUS KATHERINE PANCREATIC LYMPH NODE:           NEGATIVE FOR MALIGNANCY. B.          FINE NEEDLE ASPIRATION EUS PANCREATIC HEAD MASS:          CELLULAR FINDINGS HIGHLY SUSPICIOUS FOR WELL-DIFFERENTIATED   NEUROENDOCRINE TUMOR   A Ki-67 immunostain performed part B shows a proliferation rate of   less than 3%, compatible with a well-differentiated neuroendocrine   tumor, grade 1. Assessment and Plan   Primary pancreatic neuroendocrine tumor  This is a 79-year-old female with multiple comorbidities presenting with pyelonephritis and newly diagnosed pancreatic uncinate mass that was biopsied and showed well differentiated neuroendocrine tumor, Ki-67 was less than 3%. I had a long discussion with the patient about her diagnosis both when she was admitted to the hospital and today in the clinic. She underwent DOTATATE scan which demonstrated no extrapancreatic metastatic disease.   There is a small lesion anterior to the body of the pancreas that is avid and concerning. I have discussed that in general the treatment entails Whipple procedure, however, given her performance status, recent diagnosis of renal failure and pyelonephritis, limited mobility, obesity, COPD and shortness of breath on exertion, I am concerned that proceeding with aggressive surgery as Whipple procedure will have unfortunate outcomes in her situation. She is a high risk for surgery using the ACS NSQIP calculator. Although this is not always accurate for patients with malignancy and we tend to use ECOG PS in lieu, it gives a good insight about her morbidity and mortality from surgical intervention. We have discussed her case in tumor board as well as I discussed her case with respected colleagues and [facility] surgery specialty and the decision is to treat her disease medically using long-acting somatostatin agonist.  I will place a referral for her to see  for discussion of further treatment. I will see her back in the office in 3 months for repeat CT scan pancreas protocol. I have noted that her gastrin level is elevated, hence she should continue to be on Protonix twice daily. We will send a test for secretin stimulation gastrin level. Thank you for the opportunity to participate in the care of Milas Apley. Milas Apley has my contacts and was encouraged to call me with any questions about what we discussed today. Clinical Stage: cT2 N0 M0      Patient Active Problem List   Diagnosis Code    Acute pyelonephritis N10    Diabetes mellitus type 2 in obese (HCC) E11.69, E66.9    Pancreatic mass K86.89    Generalized abdominal pain R10.84    E. coli UTI N39.0, B96.20    Hyponatremia E87.1    High anion gap metabolic acidosis C90.87    Renal insufficiency N28.9    Abdominal pain R10.9    Primary pancreatic neuroendocrine tumor D3A.8    Neuroendocrine tumor D3A.8       Gela Francis was seen today for new patient.     Diagnoses and all orders for this visit:    Pancreatic mass  -     Medardo Umana MD, Hematology/Oncology, [place]  -     CT ABDOMEN PELVIS W WO CONTRAST Additional Contrast? None; Future  -     Miscellaneous Sendout; Future    Primary pancreatic neuroendocrine tumor    Neuroendocrine tumor    E. coli UTI    Diabetes mellitus type 2 in obese (HCC)    Renal insufficiency       Kristina Mayo MD DABS  Surgical Oncology/HPB Surgery  [facility]  [address]  [facility] [address]  Office:  [phone]  Fax:  [phone]  10/18/22   10:44 AM     CC: Latha Brenner MD

## 2023-01-24 ENCOUNTER — OFFICE VISIT (OUTPATIENT)
Dept: SURGERY | Age: 53
End: 2023-01-24

## 2023-01-24 VITALS
BODY MASS INDEX: 42.09 KG/M2 | HEIGHT: 60 IN | HEART RATE: 71 BPM | SYSTOLIC BLOOD PRESSURE: 150 MMHG | DIASTOLIC BLOOD PRESSURE: 90 MMHG | WEIGHT: 214.4 LBS

## 2023-01-24 DIAGNOSIS — K55.1 SUPERIOR MESENTERIC ARTERY STENOSIS (HCC): ICD-10-CM

## 2023-01-24 DIAGNOSIS — D37.8 NEOPLASM OF UNCERTAIN BEHAVIOR OF OTHER SPECIFIED DIGESTIVE ORGANS: ICD-10-CM

## 2023-01-24 DIAGNOSIS — D3A.8 PRIMARY PANCREATIC NEUROENDOCRINE TUMOR: Primary | ICD-10-CM

## 2023-01-24 DIAGNOSIS — E11.65 POORLY CONTROLLED DIABETES MELLITUS (HCC): ICD-10-CM

## 2023-01-24 DIAGNOSIS — R53.81 DECLINING PERFORMANCE STATUS: ICD-10-CM

## 2023-01-24 DIAGNOSIS — E16.4 ELEVATED GASTRIN LEVEL: ICD-10-CM

## 2023-01-24 RX ORDER — PANTOPRAZOLE SODIUM 40 MG/1
40 TABLET, DELAYED RELEASE ORAL
Qty: 60 TABLET | Refills: 5 | Status: SHIPPED | OUTPATIENT
Start: 2023-01-24

## 2023-01-24 ASSESSMENT — ENCOUNTER SYMPTOMS
CONSTIPATION: 0
CHEST TIGHTNESS: 0
VOMITING: 0
BLOOD IN STOOL: 0
SHORTNESS OF BREATH: 0
ABDOMINAL DISTENTION: 0
BACK PAIN: 1
DIARRHEA: 0
WHEEZING: 0
COUGH: 0
ABDOMINAL PAIN: 0
NAUSEA: 0

## 2023-01-24 NOTE — LETTER
[facility]      From: Dr. Hemal Nolasco MD  [address] [facility] [address] [place] [address].  Phone: [phone]  Fax: [phone]    Subject: Care Plan information    PLEASE LET OFFICE KNOW IF ANYTHING ELSE IS NEEDED. THANK YOU FOR ALL YOU DO! INFORMATION CONTAINED IN THIS TRANSMISSION IS FOR THE SOLE USE OF THE INTENDED RECIPIENT(S) AND MAY CONTAIN CNFIDENTIAL AND PRIVILLEGED INFORMATION. ANY UNAUTHORIZED REVIEW, USE, DISCLOSURE, OR DISTRIBUTION IS PROHIBITTED. IF YOU ARE NOT THE INTENDED RECIPIENT, PLEASE CONTACT THE SENDER FOR FUTHER INSTRUCTIONS PREGARDING THE INFORMATION.

## 2023-01-24 NOTE — PROGRESS NOTES
Review of Systems   Constitutional:  Positive for fatigue. Negative for chills, fever and unexpected weight change. Eyes:  Negative for visual disturbance. Respiratory:  Negative for cough, chest tightness, shortness of breath and wheezing. Cardiovascular:  Negative for chest pain, palpitations and leg swelling. Gastrointestinal:  Negative for abdominal distention, abdominal pain, blood in stool, constipation, diarrhea, nausea and vomiting. Genitourinary:  Negative for dysuria, hematuria and urgency. Musculoskeletal:  Positive for back pain. Skin:  Negative for rash. Neurological:  Negative for dizziness, seizures, syncope, speech difficulty, weakness, light-headedness, numbness and headaches. Hematological:  Negative for adenopathy. Does not bruise/bleed easily. Psychiatric/Behavioral:  The patient is not nervous/anxious.

## 2023-01-26 ENCOUNTER — TELEPHONE (OUTPATIENT)
Dept: ONCOLOGY | Age: 53
End: 2023-01-26

## 2023-01-26 NOTE — TELEPHONE ENCOUNTER
Dio COSTA, RN Navigator. I called Dr. Brit Flannery office to check on status of referral sent by Dr. Cheryl Schulte on 1/19/23. I spoke with Marek Warren and she states that she does not see that patient was called and will call patient today to get her scheduled. Jennifer vu.

## 2023-01-30 ENCOUNTER — TELEPHONE (OUTPATIENT)
Dept: SURGERY | Age: 53
End: 2023-01-30

## 2023-01-30 NOTE — TELEPHONE ENCOUNTER
PET scan scheduled for 2/6/23, not scheduled to be obtained until after 4/24/23. Central scheduling called and updated, CS to call patient and reschedule.

## 2023-02-03 ENCOUNTER — OFFICE VISIT (OUTPATIENT)
Dept: ONCOLOGY | Age: 53
End: 2023-02-03
Payer: MEDICARE

## 2023-02-03 ENCOUNTER — HOSPITAL ENCOUNTER (OUTPATIENT)
Dept: INFUSION THERAPY | Age: 53
Discharge: HOME | End: 2023-02-03
Payer: MEDICARE

## 2023-02-03 ENCOUNTER — TELEPHONE (OUTPATIENT)
Dept: ONCOLOGY | Age: 53
End: 2023-02-03

## 2023-02-03 ENCOUNTER — TELEPHONE (OUTPATIENT)
Dept: VASCULAR SURGERY | Age: 53
End: 2023-02-03

## 2023-02-03 VITALS
SYSTOLIC BLOOD PRESSURE: 124 MMHG | WEIGHT: 212.1 LBS | BODY MASS INDEX: 42.12 KG/M2 | HEART RATE: 71 BPM | DIASTOLIC BLOOD PRESSURE: 83 MMHG | TEMPERATURE: 97.5 F

## 2023-02-03 DIAGNOSIS — K86.89 PANCREATIC MASS: Primary | ICD-10-CM

## 2023-02-03 DIAGNOSIS — D37.8 NEOPLASM OF UNCERTAIN BEHAVIOR OF OTHER SPECIFIED DIGESTIVE ORGANS: Primary | ICD-10-CM

## 2023-02-03 DIAGNOSIS — D3A.8 PRIMARY PANCREATIC NEUROENDOCRINE TUMOR: Primary | ICD-10-CM

## 2023-02-03 DIAGNOSIS — K31.819 GAVE (GASTRIC ANTRAL VASCULAR ECTASIA): ICD-10-CM

## 2023-02-03 DIAGNOSIS — R73.9 HYPERGLYCEMIA: ICD-10-CM

## 2023-02-03 DIAGNOSIS — Z79.899 HIGH RISK MEDICATION USE: ICD-10-CM

## 2023-02-03 DIAGNOSIS — D3A.8 PRIMARY PANCREATIC NEUROENDOCRINE TUMOR: ICD-10-CM

## 2023-02-03 LAB
ABSOLUTE EOS #: 0.3 K/UL (ref 0–0.4)
ABSOLUTE LYMPH #: 1.2 K/UL (ref 1–4.8)
ABSOLUTE MONO #: 0.3 K/UL (ref 0.1–1.2)
ALBUMIN SERPL-MCNC: 4.3 G/DL (ref 3.5–5.2)
ALBUMIN/GLOBULIN RATIO: 1.3 (ref 1–2.5)
ALP SERPL-CCNC: 153 U/L (ref 35–104)
ALT SERPL-CCNC: 53 U/L (ref 5–33)
ANION GAP SERPL CALCULATED.3IONS-SCNC: 14 MMOL/L (ref 9–17)
AST SERPL-CCNC: 49 U/L
BASOPHILS # BLD: 1 % (ref 0–2)
BASOPHILS ABSOLUTE: 0.1 K/UL (ref 0–0.2)
BILIRUB SERPL-MCNC: 0.6 MG/DL (ref 0.3–1.2)
BUN SERPL-MCNC: 15 MG/DL (ref 6–20)
CALCIUM SERPL-MCNC: 9.8 MG/DL (ref 8.6–10.4)
CHLORIDE SERPL-SCNC: 93 MMOL/L (ref 98–107)
CO2 SERPL-SCNC: 22 MMOL/L (ref 20–31)
CREAT SERPL-MCNC: 1.05 MG/DL (ref 0.5–0.9)
EOSINOPHILS RELATIVE PERCENT: 5 % (ref 1–4)
GFR SERPL CREATININE-BSD FRML MDRD: >60 ML/MIN/1.73M2
GLUCOSE SERPL-MCNC: 438 MG/DL (ref 70–99)
HCT VFR BLD AUTO: 39.2 % (ref 36–46)
HGB BLD-MCNC: 12.9 G/DL (ref 12–16)
LYMPHOCYTES # BLD: 23 % (ref 24–44)
MCH RBC QN AUTO: 30.3 PG (ref 26–34)
MCHC RBC AUTO-ENTMCNC: 33 G/DL (ref 31–37)
MCV RBC AUTO: 92 FL (ref 80–100)
MONOCYTES # BLD: 6 % (ref 2–11)
PDW BLD-RTO: 13.9 % (ref 12.5–15.4)
PLATELET # BLD AUTO: 230 K/UL (ref 140–450)
PMV BLD AUTO: 8.4 FL (ref 6–12)
POTASSIUM SERPL-SCNC: 4.3 MMOL/L (ref 3.7–5.3)
PROT SERPL-MCNC: 7.6 G/DL (ref 6.4–8.3)
RBC # BLD: 4.27 M/UL (ref 4–5.2)
SEG NEUTROPHILS: 65 % (ref 36–66)
SEGMENTED NEUTROPHILS ABSOLUTE COUNT: 3.4 K/UL (ref 1.8–7.7)
SODIUM SERPL-SCNC: 129 MMOL/L (ref 135–144)
WBC # BLD AUTO: 5.3 K/UL (ref 3.5–11)

## 2023-02-03 PROCEDURE — 1036F TOBACCO NON-USER: CPT | Performed by: INTERNAL MEDICINE

## 2023-02-03 PROCEDURE — G8417 CALC BMI ABV UP PARAM F/U: HCPCS | Performed by: INTERNAL MEDICINE

## 2023-02-03 PROCEDURE — 85025 COMPLETE CBC W/AUTO DIFF WBC: CPT

## 2023-02-03 PROCEDURE — 36415 COLL VENOUS BLD VENIPUNCTURE: CPT

## 2023-02-03 PROCEDURE — 80053 COMPREHEN METABOLIC PANEL: CPT

## 2023-02-03 PROCEDURE — G8427 DOCREV CUR MEDS BY ELIG CLIN: HCPCS | Performed by: INTERNAL MEDICINE

## 2023-02-03 PROCEDURE — 6360000002 HC RX W HCPCS: Performed by: INTERNAL MEDICINE

## 2023-02-03 PROCEDURE — 99211 OFF/OP EST MAY X REQ PHY/QHP: CPT | Performed by: INTERNAL MEDICINE

## 2023-02-03 PROCEDURE — 6370000000 HC RX 637 (ALT 250 FOR IP): Performed by: INTERNAL MEDICINE

## 2023-02-03 PROCEDURE — 99214 OFFICE O/P EST MOD 30 MIN: CPT | Performed by: INTERNAL MEDICINE

## 2023-02-03 PROCEDURE — 3017F COLORECTAL CA SCREEN DOC REV: CPT | Performed by: INTERNAL MEDICINE

## 2023-02-03 PROCEDURE — 96372 THER/PROPH/DIAG INJ SC/IM: CPT

## 2023-02-03 PROCEDURE — G8484 FLU IMMUNIZE NO ADMIN: HCPCS | Performed by: INTERNAL MEDICINE

## 2023-02-03 RX ORDER — GLIMEPIRIDE 2 MG/1
2 TABLET ORAL 2 TIMES DAILY
Qty: 90 TABLET | Refills: 1
Start: 2023-02-03

## 2023-02-03 RX ORDER — LANREOTIDE ACETATE 120 MG/.5ML
120 INJECTION SUBCUTANEOUS ONCE
Status: COMPLETED | OUTPATIENT
Start: 2023-02-03 | End: 2023-02-03

## 2023-02-03 RX ORDER — EPINEPHRINE 1 MG/ML
0.3 INJECTION, SOLUTION, CONCENTRATE INTRAVENOUS PRN
OUTPATIENT
Start: 2023-03-03

## 2023-02-03 RX ORDER — LANREOTIDE ACETATE 120 MG/.5ML
120 INJECTION SUBCUTANEOUS ONCE
OUTPATIENT
Start: 2023-03-03 | End: 2023-03-03

## 2023-02-03 RX ORDER — FAMOTIDINE 10 MG/ML
20 INJECTION, SOLUTION INTRAVENOUS
OUTPATIENT
Start: 2023-03-03

## 2023-02-03 RX ORDER — ONDANSETRON 2 MG/ML
8 INJECTION INTRAMUSCULAR; INTRAVENOUS
OUTPATIENT
Start: 2023-03-03

## 2023-02-03 RX ORDER — SODIUM CHLORIDE 9 MG/ML
INJECTION, SOLUTION INTRAVENOUS CONTINUOUS
OUTPATIENT
Start: 2023-03-03

## 2023-02-03 RX ORDER — ALBUTEROL SULFATE 90 UG/1
4 AEROSOL, METERED RESPIRATORY (INHALATION) PRN
OUTPATIENT
Start: 2023-03-03

## 2023-02-03 RX ORDER — DIPHENHYDRAMINE HYDROCHLORIDE 50 MG/ML
50 INJECTION INTRAMUSCULAR; INTRAVENOUS
OUTPATIENT
Start: 2023-03-03

## 2023-02-03 RX ORDER — ACETAMINOPHEN 325 MG/1
650 TABLET ORAL
OUTPATIENT
Start: 2023-03-03

## 2023-02-03 RX ADMIN — LANREOTIDE ACETATE 120 MG: 120 INJECTION SUBCUTANEOUS at 12:51

## 2023-02-03 RX ADMIN — INSULIN HUMAN 8 UNITS: 100 INJECTION, SOLUTION PARENTERAL at 13:00

## 2023-02-03 NOTE — TELEPHONE ENCOUNTER
[facility] On[facility] Nutrition Note:    RD received call from [facility], Infusion RN with concerns for pt's blood glucose and noted knowledge deficit surrounding DM and blood glucose management. RD called pt on listed phone number. No answer; detailed message provided. RD mailed DM education handouts to patient's listed home address. Pt would benefit from outpatient DM nutrition counseling which is available through [address], [address] [place] [address]. RD discussed with oncologist, Dr Belinda Campbell who will defer to PCP for further mgmt.     MARIA TERESA Haile, RD, LD  Registered Dietitian   [facility]  [phone]

## 2023-02-03 NOTE — PATIENT INSTRUCTIONS
Give insulin regular S/c 8 unit now  Tx today   Kindly schedule apt for pt with vasular surgery ( 2nd referral ,  she did not get scheduled on first referal )   Rv in 4 wesk

## 2023-02-03 NOTE — PROGRESS NOTES
Patient arrived for lanreotide. Pt had visit with Dr Adam Dyer, received order to proceed with treatment today and give 8 units of regular insulin. Pt stable at discharge. Scheduled to return 3/3/23 for lanreotide.

## 2023-02-03 NOTE — TELEPHONE ENCOUNTER
Name: Rhonda Beckford  : 1970  MRN: [id number]    Oncology Navigation Follow-Up Note    Contact Type:  Medical Oncology    Notes: Pt @ [facility] for Dr. Bharti Gallegos f/u & tx. Met with pt prior to f/u. Pt denied questions/concerns. Inquired if Dr. Alford [facility] office contacted, pt stated no. Instructed pt writer will update Dr. Bharti Gallegos. Encouraged pt to contact writer prn. Dr. Bharti Gallegos updated. Dr. Bharti Gallegos stated will place second referral & request [facility]  contact office to schedule. Will continue to follow.     Electronically signed by Camila Ibarra RN on 2/3/2023 at 2:06 PM

## 2023-02-03 NOTE — PROGRESS NOTES
Tammy Cordero                                                                                                                  2/3/2023  MRN:   [id number]  YOB: 1970  PCP:                           Jean Paul Meléndez MD  Referring Physician: No ref. provider found  Treating Physician Name: Cris Hayden MD      Reason for visit:  Chief Complaint   Patient presents with    Follow-up     Review status of disease   Toxicity check. Discussed treatment plan. Current problems:  Well-differentiated, grade 1, pancreatic neuroendocrine tumor, clinical stage T2 N0 M0  Elevated gastrin (patient on Protonix)  Multiple comorbidities including diabetes mellitus obesity, renal insufficiency and severe hepatic steatosis  Family history of leukemia     Active and recent treatments:  Patient medically unfit for surgery  Lanreotide-11/2022    Interim History:  Patient presents to the clinic for a follow-up visit and to discuss results of her lab work-up and the relevant clinical data. Patient glucose is significantly elevated at more than 400. Patient states she feels fine but does feel more tired than usual.  Patient has not seen a primary care physician for a while. She takes glimepiride 2 mg daily. Patient denies any abdominal pain at this point. Denies weight loss. Overall has been tolerating lanreotide without any unexpected or severe side effects. During this visit patient's allergy, social, medical, surgical history and medications were reviewed and updated. Summary of Case/History:    Tammy Cordero a 46 y. o.female is a patient who presents to the clinic to establish care and for further work-up and evaluation. Patient has multiple comorbidities including diabetes COPD hypertension morbid obesity. Patient initially presented to the emergency department with complaints of right lower quadrant pain radiating to the back. Work-up revealed acute kidney injury pyelonephritis.   Patient CT scan without contrast also showed a possible solid-appearing lesion in the pancreatic uncinate process there was no biliary or pancreatic ductal dilatation noted. There were peripancreatic lymph nodes noted. There was also evidence of severe hepatic steatosis. Patient underwent EGD which showed erythematous changes in the cystic gastric antrum and possible gave. There was 8 mm bleeding C-cell polyp in the duodenum which was removed. EUS of the pancreas demonstrated hypoechoic solid mass in the pancreatic uncinate process measuring 2.5 x 2 cm. Patient underwent biopsy which revealed well differentiated, grade 1 PNET with Ki-67 less than 3%. Lymph node biopsy was negative. On history patient denies any symptoms of carcinoid syndrome. Her performance status is not very good. She uses a wheelchair to get around. Patient underwent dotatate PET scan which showed hepatic steatosis solid lesion in the pancreatic head.     Past Medical History:   Past Medical History:   Diagnosis Date    Arthritis     Diabetes mellitus ([address]     Hypertension     Obese        Past Surgical History:     Past Surgical History:   Procedure Laterality Date    HERNIA REPAIR      TUBAL LIGATION      UPPER GASTROINTESTINAL ENDOSCOPY N/A 2022    EGD W/EUS FNA performed by Colletta Couch, MD at [place] [address]  2022    EGD BIOPSY performed by Colletta Couch, MD at [facility]       Patient Family Social History:  History of leukemia    Social History     Socioeconomic History    Marital status: Single     Spouse name: None    Number of children: None    Years of education: None    Highest education level: None   Tobacco Use    Smoking status: Former     Types: Cigarettes     Quit date:      Years since quittin.0    Smokeless tobacco: Never    Tobacco comments:     Hx 1 pack per month quit 5+ years ago    Substance and Sexual Activity    Alcohol use: No     Comment: quit drinking on w/e's 1 year ago    Drug use: No     Comment: quit smoking marijuana 1 year ago       Current Medications:     Current Outpatient Medications   Medication Sig Dispense Refill    pantoprazole (PROTONIX) 40 MG tablet Take 1 tablet by mouth 2 times daily (before meals) 60 tablet 5    glipiZIDE (GLUCOTROL) 5 MG tablet Take 1 tablet by mouth 2 times daily 60 tablet 3    glipiZIDE (GLUCOTROL) 5 MG tablet Take 1 tablet by mouth daily 30 tablet 0    levothyroxine (SYNTHROID) 100 MCG tablet Take 100 mcg by mouth Daily      pantoprazole (PROTONIX) 40 MG tablet Take 1 tablet by mouth every morning (before breakfast) 30 tablet 3    docusate sodium (COLACE) 100 MG capsule Take 1 capsule by mouth 2 times daily 14 capsule 0    simvastatin (ZOCOR) 40 MG tablet Take 40 mg by mouth nightly      acetaminophen (TYLENOL) 325 MG tablet Take 650 mg by mouth every 6 hours as needed for Pain      benzonatate (TESSALON) 100 MG capsule Take 1 capsule by mouth 3 times daily as needed for Cough 20 capsule 0    LISINOPRIL PO Take 20 mg by mouth daily. No current facility-administered medications for this visit. Allergies:   Codeine and Percocet [oxycodone-acetaminophen]    Review of Systems:    Constitutional: No fever or chills. No night sweats, no weight loss   Eyes: No eye discharge, double vision, or eye pain   HEENT: negative for sore mouth, sore throat, hoarseness and voice change   Respiratory: negative for cough , sputum, dyspnea, wheezing, hemoptysis, chest pain   Cardiovascular: negative for chest pain, dyspnea, palpitations, orthopnea, PND   Gastrointestinal: negative for nausea, vomiting, diarrhea, constipation, Dysphagia, hematemesis and hematochezia    Genitourinary: negative for frequency, dysuria, nocturia, urinary incontinence, and hematuria   Integument: negative for rash, skin lesions, bruises.    Hematologic/Lymphatic: negative for easy bruising, bleeding, lymphadenopathy, or petechiae   Endocrine: negative for heat or cold intolerance,weight changes, change in bowel habits and hair loss   Musculoskeletal: negative for myalgias, arthralgias, pain, joint swelling,and bone pain   Neurological: negative for headaches, dizziness, seizures, weakness, numbness        Physical Exam:    Vitals: /83   Pulse 71   Temp 97.5 °F (36.4 °C) (Temporal)   Wt 212 lb 1.6 oz (96.2 kg)   BMI 42.12 kg/m²   General appearance - well appearing, no in pain or distress.   Patient in electric wheelchair  Mental status - AAO X3  Eyes - pupils equal and reactive, extraocular eye movements intact  Mouth - mucous membranes moist, pharynx normal without lesions  Neck - supple, no significant adenopathy  Lymphatics - no palpable lymphadenopathy, no hepatosplenomegaly  Chest - clear to auscultation, no wheezes, rales or rhonchi, symmetric air entry  Heart - normal rate, regular rhythm, normal S1, S2, no murmurs  Abdomen - soft, nontender, nondistended, no masses or organomegaly  Neurological - alert, oriented, normal speech, no focal findings or movement disorder noted  Extremities - peripheral pulses normal, no pedal edema, no clubbing or cyanosis  Skin - normal coloration and turgor, no rashes, no suspicious skin lesions noted       DATA:    Results for orders placed or performed during the hospital encounter of 02/03/23   CBC with Auto Differential   Result Value Ref Range    WBC 5.3 3.5 - 11.0 k/uL    RBC 4.27 4.0 - 5.2 m/uL    Hemoglobin 12.9 12.0 - 16.0 g/dL    Hematocrit 39.2 36 - 46 %    MCV 92.0 80 - 100 fL    MCH 30.3 26 - 34 pg    MCHC 33.0 31 - 37 g/dL    RDW 13.9 12.5 - 15.4 %    Platelets 607 382 - 307 k/uL    MPV 8.4 6.0 - 12.0 fL    Seg Neutrophils 65 36 - 66 %    Lymphocytes 23 (L) 24 - 44 %    Monocytes 6 2 - 11 %    Eosinophils % 5 (H) 1 - 4 %    Basophils 1 0 - 2 %    Segs Absolute 3.40 1.8 - 7.7 k/uL    Absolute Lymph # 1.20 1.0 - 4.8 k/uL    Absolute Mono # 0.30 0.1 - 1.2 k/uL    Absolute Eos # 0.30 0.0 - 0.4 k/uL    Basophils Absolute 0.10 0.0 - 0.2 k/uL   Comprehensive Metabolic Panel   Result Value Ref Range    Glucose 438 (HH) 70 - 99 mg/dL    BUN 15 6 - 20 mg/dL    Creatinine 1.05 (H) 0.50 - 0.90 mg/dL    Est, Glom Filt Rate >60 >60 mL/min/1.73m2    Calcium 9.8 8.6 - 10.4 mg/dL    Sodium 129 (L) 135 - 144 mmol/L    Potassium 4.3 3.7 - 5.3 mmol/L    Chloride 93 (L) 98 - 107 mmol/L    CO2 22 20 - 31 mmol/L    Anion Gap 14 9 - 17 mmol/L    Alkaline Phosphatase 153 (H) 35 - 104 U/L    ALT 53 (H) 5 - 33 U/L    AST 49 (H) <32 U/L    Total Bilirubin 0.6 0.3 - 1.2 mg/dL    Total Protein 7.6 6.4 - 8.3 g/dL    Albumin 4.3 3.5 - 5.2 g/dL    Albumin/Globulin Ratio 1.3 1.0 - 2.5       CT ABDOMEN PELVIS W WO CONTRAST Additional Contrast? None    Result Date: 1/19/2023  EXAMINATION: CT OF THE ABDOMEN AND PELVIS WITH AND WITHOUT CONTRAST 1/18/2023 8:41 am TECHNIQUE: CT of the abdomen and pelvis was performed with and without the administration of intravenous contrast.  Multiplanar reformatted images are provided for review. Automated exposure control, iterative reconstruction, and/or weight based adjustment of the mA/kV was utilized to reduce the radiation dose to as low as reasonably achievable. COMPARISON: MRI 09/20/2022, PET-CT 10/05/2022 HISTORY: ORDERING SYSTEM PROVIDED HISTORY: Pancreatic mass TECHNOLOGIST PROVIDED HISTORY: STAT Creatinine as needed:->Yes Dr. Aleah Meléndez protocol Reason for Exam:  Pancreatic mass FINDINGS: Lower Chest: Lung bases are clear. No effusion. Organs: The mass mass at the posterior aspect of the pancreatic head measures approximately 2.7 x 2.2 cm in axial dimensions. Density follows blood pool on all phases. Diffusely decreased attenuation of the liver. Gallbladder, spleen, kidneys and adrenals appear within normal limits. GI/Bowel: No abnormal dilatation or appreciable wall thickening. Normal appendix. Pelvis: Urinary bladder within normal limits. Uterus and adnexa unremarkable for CT technique. Peritoneum/Retroperitoneum: No free fluid. No retroperitoneal adenopathy. Abdominal aorta is normal in caliber. Celiac artery is widely patent. Severe focal stenosis of the proximal SMA. Bilateral renal arteries appear widely patent. The GENEVIEVE is patent. Iliac arteries are widely patent. Bones/Soft Tissues: No acute soft tissue abnormality identified. No aggressive osseous lesions. Highly vascular pancreatic mass of the uncinate process compatible with neuroendocrine tumor measuring approximately 2.7 x 2.2 cm, not significantly changed. Severe focal stenosis of the proximal SMA secondary to mural thrombus or noncalcified atherosclerotic disease. Maximum stenosis estimated at greater than 90%. Hepatic steatosis. Impression:  Well-differentiated, grade 1, pancreatic neuroendocrine tumor, clinical stage T2 N0 M0  Elevated gastrin (patient on Protonix)  Multiple comorbidities including diabetes mellitus obesity, renal insufficiency and severe hepatic steatosis  Family history of leukemia  Gave    Plan:  I had a detailed discussion with the patient and we went over results of lab work-up imaging studies and other relevant clinical data  Toxicity check performed. Patient overall tolerating treatment well. Continue lanreotide. Continue periodic evaluation with imaging studies  Patient glucose is more than 400. I will give subcutaneous insulin regular 8 units a day. I also advised the patient to increase Amaryl to 2 mg twice daily. She has not seen her primary care physician lately I strongly encouraged her to contact primary care physician for an appointment as soon as possible  We will resend referral for vascular surgery for SMA occlusion noted on the scans. NCCN guidelines were reviewed and discussed with the patient. The diagnosis and care plan were discussed with the patient in detail.  I discussed the natural history of the disease, prognosis, risks and goals of therapy and answered all the patients questions to the best of my ability. Patient expressed understanding and was in agreement. Jessica Johnson MD            This note is created with the assistance of a speech recognition program.  While intending to generate a document that actually reflects the content of the visit, the document can still have some errors including those of syntax and sound a like substitutions which may escape proof reading. It such instances, actual meaning can be extrapolated by contextual diversion.

## 2023-02-04 DIAGNOSIS — K55.1 SUPERIOR MESENTERIC ARTERY STENOSIS (HCC): Primary | ICD-10-CM

## 2023-02-14 ENCOUNTER — HOSPITAL ENCOUNTER (OUTPATIENT)
Dept: VASCULAR LAB | Age: 53
Discharge: HOME | End: 2023-02-14
Payer: MEDICARE

## 2023-02-14 DIAGNOSIS — K55.1 SUPERIOR MESENTERIC ARTERY STENOSIS (HCC): ICD-10-CM

## 2023-02-14 PROCEDURE — 93975 VASCULAR STUDY: CPT

## 2023-02-28 ENCOUNTER — INITIAL CONSULT (OUTPATIENT)
Dept: VASCULAR SURGERY | Age: 53
End: 2023-02-28
Payer: MEDICARE

## 2023-02-28 VITALS
HEART RATE: 93 BPM | SYSTOLIC BLOOD PRESSURE: 140 MMHG | RESPIRATION RATE: 17 BRPM | HEIGHT: 60 IN | WEIGHT: 211 LBS | DIASTOLIC BLOOD PRESSURE: 88 MMHG | OXYGEN SATURATION: 99 % | BODY MASS INDEX: 41.43 KG/M2

## 2023-02-28 DIAGNOSIS — K55.1 SUPERIOR MESENTERIC ARTERY STENOSIS (HCC): Primary | ICD-10-CM

## 2023-02-28 PROCEDURE — G8427 DOCREV CUR MEDS BY ELIG CLIN: HCPCS | Performed by: SURGERY

## 2023-02-28 PROCEDURE — 3017F COLORECTAL CA SCREEN DOC REV: CPT | Performed by: SURGERY

## 2023-02-28 PROCEDURE — G8484 FLU IMMUNIZE NO ADMIN: HCPCS | Performed by: SURGERY

## 2023-02-28 PROCEDURE — G8417 CALC BMI ABV UP PARAM F/U: HCPCS | Performed by: SURGERY

## 2023-02-28 PROCEDURE — 1036F TOBACCO NON-USER: CPT | Performed by: SURGERY

## 2023-02-28 PROCEDURE — 99204 OFFICE O/P NEW MOD 45 MIN: CPT | Performed by: SURGERY

## 2023-02-28 RX ORDER — LISINOPRIL AND HYDROCHLOROTHIAZIDE 20; 12.5 MG/1; MG/1
TABLET ORAL
COMMUNITY
Start: 2023-02-24

## 2023-02-28 RX ORDER — GLIPIZIDE 5 MG/1
TABLET ORAL
COMMUNITY
Start: 2023-02-08

## 2023-02-28 NOTE — PROGRESS NOTES
Division of Vascular Surgery        New Consult      Physician Requesting Consult:  Jean-Claude Marr MD    Reason for Consult:   Superior mesenteric artery stenosis    Chief Complaint:     Superior mesenteric artery stenosis    History of Present Illness:      Robert Rodrigues is a 46 y. o. woman who presents with incidental finding of superior mesenteric artery stenosis on CT scan for workup of pancreatic mass diagnosed as pancreatic neuroendocrine tumor. Denies any abdominal pain, denies food fear or post prandial abdominal pain. She tolerates full meals without much complaints of feeling full or bloated. Denies loose stools or blood in them. No unintentional weight loss that she can appreciate. Overall asymptomatic with no signs of chronic or acute mesenteric ischemia. Diabetic  Stays away from sugar, not on insulin    Neuroendricne tumor being treated with hormone shots, not surgical candidate    COPD discomfort, intermittent chest pain  Non-exertional    Cramping in left calf at times  Muscles get tight from her arthritis  Palpable pedal pulses    Small lipoma in both arms, had them for years, less then 1 cm    Medical History:     Past Medical History:   Diagnosis Date    Arthritis     Diabetes mellitus ([facility] [address]     Hypertension     Obese        Surgical History:     Past Surgical History:   Procedure Laterality Date    HERNIA REPAIR      TUBAL LIGATION      UPPER GASTROINTESTINAL ENDOSCOPY N/A 9/20/2022    EGD W/EUS FNA performed by Arnol Kern MD at [place] [address]  9/20/2022    EGD BIOPSY performed by Arnol Kern MD at [facility]       Family History:     History reviewed. No pertinent family history.     Allergies:       Codeine and Percocet [oxycodone-acetaminophen]    Medications:      Current Outpatient Medications   Medication Sig Dispense Refill    glipiZIDE (GLUCOTROL) 5 MG tablet take 1 tablet by mouth twice a day      lisinopril-hydroCHLOROthiazide (PRINZIDE;ZESTORETIC) 20-12.5 MG per tablet take 1 tablet by mouth once daily      glimepiride (AMARYL) 2 MG tablet Take 1 tablet by mouth in the morning and at bedtime 90 tablet 1    pantoprazole (PROTONIX) 40 MG tablet Take 1 tablet by mouth 2 times daily (before meals) 60 tablet 5    levothyroxine (SYNTHROID) 100 MCG tablet Take 100 mcg by mouth Daily      pantoprazole (PROTONIX) 40 MG tablet Take 1 tablet by mouth every morning (before breakfast) 30 tablet 3    docusate sodium (COLACE) 100 MG capsule Take 1 capsule by mouth 2 times daily 14 capsule 0    simvastatin (ZOCOR) 40 MG tablet Take 40 mg by mouth nightly      acetaminophen (TYLENOL) 325 MG tablet Take 650 mg by mouth every 6 hours as needed for Pain      benzonatate (TESSALON) 100 MG capsule Take 1 capsule by mouth 3 times daily as needed for Cough 20 capsule 0    LISINOPRIL PO Take 20 mg by mouth daily. No current facility-administered medications for this visit. Social History:     Tobacco:    reports that she quit smoking about 6 years ago. Her smoking use included cigarettes. She has never used smokeless tobacco.  Alcohol:      reports no history of alcohol use. Drug Use:  reports no history of drug use. Occupation:  Worked at [employer]    Review of Systems:     Review of Systems   Constitutional:  Negative for appetite change, chills, fatigue and fever. HENT:  Negative for congestion. Eyes:  Negative for visual disturbance. Respiratory:  Negative for chest tightness and shortness of breath. Cardiovascular:  Positive for chest pain. Negative for leg swelling. Gastrointestinal:  Negative for abdominal distention, abdominal pain, blood in stool, diarrhea and vomiting. Endocrine: Negative. Genitourinary: Negative. Musculoskeletal:  Positive for arthralgias. Skin:  Negative for color change and wound. Allergic/Immunologic: Negative.     Neurological:  Negative for facial asymmetry, speech difficulty and weakness. Hematological: Negative. Psychiatric/Behavioral: Negative. Physical Exam:     Vitals:  BP (!) 140/88 (Site: Left Lower Arm, Position: Sitting, Cuff Size: Medium Adult)   Pulse 93   Resp 17   Ht 4' 11.5\" (1.511 m)   Wt 211 lb (95.7 kg)   SpO2 99%   BMI 41.90 kg/m²     Physical Exam  Constitutional:       Appearance: She is well-developed and well-groomed. Eyes:      Extraocular Movements: Extraocular movements intact. Conjunctiva/sclera: Conjunctivae normal.   Neck:      Vascular: No carotid bruit. Cardiovascular:      Rate and Rhythm: Normal rate and regular rhythm. Pulses:           Radial pulses are 2+ on the right side and 2+ on the left side. Dorsalis pedis pulses are 2+ on the right side and 2+ on the left side. Posterior tibial pulses are 2+ on the right side and 2+ on the left side. Pulmonary:      Effort: Pulmonary effort is normal. No respiratory distress. Abdominal:      Palpations: Abdomen is soft. Tenderness: There is no abdominal tenderness. Musculoskeletal:      Right upper arm: No swelling or tenderness. Left upper arm: No swelling or tenderness. Right forearm: No swelling or tenderness. Left forearm: No swelling or tenderness. Cervical back: Full passive range of motion without pain. Right lower leg: No edema. Left lower leg: No edema. Comments: Small lipoma type nodules in both forearms, less then 1 cm mobile   Feet:      Right foot:      Skin integrity: No ulcer or skin breakdown. Left foot:      Skin integrity: No ulcer or skin breakdown. Skin:     General: Skin is warm. Capillary Refill: Capillary refill takes less than 2 seconds. Neurological:      Mental Status: She is alert and oriented to person, place, and time. GCS: GCS eye subscore is 4. GCS verbal subscore is 5. GCS motor subscore is 6. Sensory: Sensation is intact. Motor: Motor function is intact.    Psychiatric: Attention and Perception: Attention normal.         Mood and Affect: Mood normal.         Speech: Speech normal.         Behavior: Behavior normal.     Imaging/Labs:     CT abdomen and pelvis (1/18/23) reveals stenosis of proximal SMA        Mesenteric artery duplex(2/14/23) reviewed and reveals >70% stenosis of SMA        Assessment and Plan:     Superior mesenteric artery stenosis, asymptomatic without evidence of mesenteric ischemia  Start aspirin 81 mg daily for cardiovascular risks reduction  Already on statins  At this time no indication for surgical intervention  Widely patent celiac artery and collateral circulation from pancreatic duodenal artery  Will continue to monitor with surveillance duplex scan yearly  If develops symptoms concerning for mesenteric ischemic chronic or acute can consider SMA angiography and stenting  Educated on symptoms of chronic mesenteric ischemia    Electronically signed by Scarlet Pedroza MD on 2/28/23 at 11:37 AM [address]  Office: [phone]  Cell: [phone]  Email: [email or web address]

## 2023-03-03 ENCOUNTER — OFFICE VISIT (OUTPATIENT)
Dept: ONCOLOGY | Age: 53
End: 2023-03-03
Payer: MEDICARE

## 2023-03-03 ENCOUNTER — HOSPITAL ENCOUNTER (OUTPATIENT)
Dept: INFUSION THERAPY | Age: 53
Discharge: HOME | End: 2023-03-03
Payer: MEDICARE

## 2023-03-03 ENCOUNTER — TELEPHONE (OUTPATIENT)
Dept: ONCOLOGY | Age: 53
End: 2023-03-03

## 2023-03-03 VITALS
DIASTOLIC BLOOD PRESSURE: 82 MMHG | BODY MASS INDEX: 42.54 KG/M2 | TEMPERATURE: 96.9 F | WEIGHT: 214.2 LBS | SYSTOLIC BLOOD PRESSURE: 123 MMHG | HEART RATE: 66 BPM

## 2023-03-03 DIAGNOSIS — R73.9 HYPERGLYCEMIA: ICD-10-CM

## 2023-03-03 DIAGNOSIS — Z79.899 HIGH RISK MEDICATION USE: ICD-10-CM

## 2023-03-03 DIAGNOSIS — K86.89 PANCREATIC MASS: Primary | ICD-10-CM

## 2023-03-03 DIAGNOSIS — D3A.8 PRIMARY PANCREATIC NEUROENDOCRINE TUMOR: Primary | ICD-10-CM

## 2023-03-03 LAB
ABSOLUTE EOS #: 0.5 K/UL (ref 0–0.4)
ABSOLUTE LYMPH #: 1.4 K/UL (ref 1–4.8)
ABSOLUTE MONO #: 0.3 K/UL (ref 0.1–1.2)
ALBUMIN SERPL-MCNC: 4.4 G/DL (ref 3.5–5.2)
ALBUMIN/GLOBULIN RATIO: 1.4 (ref 1–2.5)
ALP SERPL-CCNC: 149 U/L (ref 35–104)
ALT SERPL-CCNC: 58 U/L (ref 5–33)
ANION GAP SERPL CALCULATED.3IONS-SCNC: 12 MMOL/L (ref 9–17)
AST SERPL-CCNC: 53 U/L
BASOPHILS # BLD: 1 % (ref 0–2)
BASOPHILS ABSOLUTE: 0.1 K/UL (ref 0–0.2)
BILIRUB SERPL-MCNC: 0.4 MG/DL (ref 0.3–1.2)
BUN SERPL-MCNC: 15 MG/DL (ref 6–20)
CALCIUM SERPL-MCNC: 9.4 MG/DL (ref 8.6–10.4)
CHLORIDE SERPL-SCNC: 104 MMOL/L (ref 98–107)
CO2 SERPL-SCNC: 22 MMOL/L (ref 20–31)
CREAT SERPL-MCNC: 0.9 MG/DL (ref 0.5–0.9)
EOSINOPHILS RELATIVE PERCENT: 9 % (ref 1–4)
GFR SERPL CREATININE-BSD FRML MDRD: >60 ML/MIN/1.73M2
GLUCOSE SERPL-MCNC: 133 MG/DL (ref 70–99)
HCT VFR BLD AUTO: 37.2 % (ref 36–46)
HGB BLD-MCNC: 12.4 G/DL (ref 12–16)
LYMPHOCYTES # BLD: 25 % (ref 24–44)
MCH RBC QN AUTO: 30.9 PG (ref 26–34)
MCHC RBC AUTO-ENTMCNC: 33.3 G/DL (ref 31–37)
MCV RBC AUTO: 93 FL (ref 80–100)
MONOCYTES # BLD: 5 % (ref 2–11)
PDW BLD-RTO: 13.6 % (ref 12.5–15.4)
PLATELET # BLD AUTO: 198 K/UL (ref 140–450)
PMV BLD AUTO: 7.8 FL (ref 6–12)
POTASSIUM SERPL-SCNC: 3.7 MMOL/L (ref 3.7–5.3)
PROT SERPL-MCNC: 7.6 G/DL (ref 6.4–8.3)
RBC # BLD: 4 M/UL (ref 4–5.2)
SEG NEUTROPHILS: 60 % (ref 36–66)
SEGMENTED NEUTROPHILS ABSOLUTE COUNT: 3.3 K/UL (ref 1.8–7.7)
SODIUM SERPL-SCNC: 138 MMOL/L (ref 135–144)
WBC # BLD AUTO: 5.6 K/UL (ref 3.5–11)

## 2023-03-03 PROCEDURE — 3017F COLORECTAL CA SCREEN DOC REV: CPT | Performed by: INTERNAL MEDICINE

## 2023-03-03 PROCEDURE — G8417 CALC BMI ABV UP PARAM F/U: HCPCS | Performed by: INTERNAL MEDICINE

## 2023-03-03 PROCEDURE — 96372 THER/PROPH/DIAG INJ SC/IM: CPT

## 2023-03-03 PROCEDURE — G8484 FLU IMMUNIZE NO ADMIN: HCPCS | Performed by: INTERNAL MEDICINE

## 2023-03-03 PROCEDURE — 1036F TOBACCO NON-USER: CPT | Performed by: INTERNAL MEDICINE

## 2023-03-03 PROCEDURE — G8427 DOCREV CUR MEDS BY ELIG CLIN: HCPCS | Performed by: INTERNAL MEDICINE

## 2023-03-03 PROCEDURE — 99214 OFFICE O/P EST MOD 30 MIN: CPT | Performed by: INTERNAL MEDICINE

## 2023-03-03 PROCEDURE — 85025 COMPLETE CBC W/AUTO DIFF WBC: CPT

## 2023-03-03 PROCEDURE — 6360000002 HC RX W HCPCS: Performed by: INTERNAL MEDICINE

## 2023-03-03 PROCEDURE — 36415 COLL VENOUS BLD VENIPUNCTURE: CPT

## 2023-03-03 PROCEDURE — 80053 COMPREHEN METABOLIC PANEL: CPT

## 2023-03-03 RX ORDER — FAMOTIDINE 10 MG/ML
20 INJECTION, SOLUTION INTRAVENOUS
OUTPATIENT
Start: 2023-03-31

## 2023-03-03 RX ORDER — ACETAMINOPHEN 325 MG/1
650 TABLET ORAL
OUTPATIENT
Start: 2023-03-31

## 2023-03-03 RX ORDER — SODIUM CHLORIDE 9 MG/ML
INJECTION, SOLUTION INTRAVENOUS CONTINUOUS
OUTPATIENT
Start: 2023-03-31

## 2023-03-03 RX ORDER — LANREOTIDE ACETATE 120 MG/.5ML
120 INJECTION SUBCUTANEOUS ONCE
Status: COMPLETED | OUTPATIENT
Start: 2023-03-03 | End: 2023-03-03

## 2023-03-03 RX ORDER — ALBUTEROL SULFATE 90 UG/1
4 AEROSOL, METERED RESPIRATORY (INHALATION) PRN
OUTPATIENT
Start: 2023-03-31

## 2023-03-03 RX ORDER — LANREOTIDE ACETATE 120 MG/.5ML
120 INJECTION SUBCUTANEOUS ONCE
OUTPATIENT
Start: 2023-03-31 | End: 2023-03-31

## 2023-03-03 RX ORDER — EPINEPHRINE 1 MG/ML
0.3 INJECTION, SOLUTION, CONCENTRATE INTRAVENOUS PRN
OUTPATIENT
Start: 2023-03-31

## 2023-03-03 RX ORDER — ONDANSETRON 2 MG/ML
8 INJECTION INTRAMUSCULAR; INTRAVENOUS
OUTPATIENT
Start: 2023-03-31

## 2023-03-03 RX ORDER — DIPHENHYDRAMINE HYDROCHLORIDE 50 MG/ML
50 INJECTION INTRAMUSCULAR; INTRAVENOUS
OUTPATIENT
Start: 2023-03-31

## 2023-03-03 RX ADMIN — LANREOTIDE ACETATE 120 MG: 120 INJECTION SUBCUTANEOUS at 13:24

## 2023-03-03 NOTE — TELEPHONE ENCOUNTER
Name: Shari Gutierrez  : 1970  MRN: [id number]    Oncology Navigation Follow-Up Note    Contact Type:  Medical Oncology    Notes: Pt @ [facility] for Dr. Romario Rock f/u & tx. Met with pt prior to f/u. Pt denied questions/concerns. Encouraged pt to contact writer prn.     Electronically signed by Delroy Ambrocio RN on 3/3/2023 at 1:26 PM

## 2023-03-03 NOTE — PROGRESS NOTES
Pt here for Lanreotide injection. Pt seen by Dr Keshav Short. Injection given without incident. Pt discharged in stable condition. Returns 3-31-23 for MD clarke/charo and Lanreotide.

## 2023-03-03 NOTE — PROGRESS NOTES
take 1 tablet by mouth twice a day      lisinopril-hydroCHLOROthiazide (PRINZIDE;ZESTORETIC) 20-12.5 MG per tablet take 1 tablet by mouth once daily      glimepiride (AMARYL) 2 MG tablet Take 1 tablet by mouth in the morning and at bedtime 90 tablet 1    pantoprazole (PROTONIX) 40 MG tablet Take 1 tablet by mouth 2 times daily (before meals) 60 tablet 5    levothyroxine (SYNTHROID) 100 MCG tablet Take 100 mcg by mouth Daily      pantoprazole (PROTONIX) 40 MG tablet Take 1 tablet by mouth every morning (before breakfast) 30 tablet 3    docusate sodium (COLACE) 100 MG capsule Take 1 capsule by mouth 2 times daily 14 capsule 0    simvastatin (ZOCOR) 40 MG tablet Take 40 mg by mouth nightly      acetaminophen (TYLENOL) 325 MG tablet Take 650 mg by mouth every 6 hours as needed for Pain      benzonatate (TESSALON) 100 MG capsule Take 1 capsule by mouth 3 times daily as needed for Cough 20 capsule 0    LISINOPRIL PO Take 20 mg by mouth daily. No current facility-administered medications for this visit. Allergies:   Codeine and Percocet [oxycodone-acetaminophen]    Review of Systems:    Constitutional: No fever or chills. No night sweats, no weight loss   Eyes: No eye discharge, double vision, or eye pain   HEENT: negative for sore mouth, sore throat, hoarseness and voice change   Respiratory: negative for cough , sputum, dyspnea, wheezing, hemoptysis, chest pain   Cardiovascular: negative for chest pain, dyspnea, palpitations, orthopnea, PND   Gastrointestinal: negative for nausea, vomiting, diarrhea, constipation, Dysphagia, hematemesis and hematochezia    Genitourinary: negative for frequency, dysuria, nocturia, urinary incontinence, and hematuria   Integument: negative for rash, skin lesions, bruises.    Hematologic/Lymphatic: negative for easy bruising, bleeding, lymphadenopathy, or petechiae   Endocrine: negative for heat or cold intolerance,weight changes, change in bowel habits and hair loss

## 2023-03-05 PROBLEM — K55.1 SUPERIOR MESENTERIC ARTERY STENOSIS (HCC): Status: ACTIVE | Noted: 2023-03-05

## 2023-03-05 ASSESSMENT — ENCOUNTER SYMPTOMS
CHEST TIGHTNESS: 0
SHORTNESS OF BREATH: 0
ALLERGIC/IMMUNOLOGIC NEGATIVE: 1
VOMITING: 0
ABDOMINAL DISTENTION: 0
COLOR CHANGE: 0
ABDOMINAL PAIN: 0
BLOOD IN STOOL: 0
DIARRHEA: 0

## 2023-03-06 ENCOUNTER — TELEPHONE (OUTPATIENT)
Dept: ONCOLOGY | Age: 53
End: 2023-03-06

## 2023-03-06 NOTE — TELEPHONE ENCOUNTER
AVS from 3/3/23      Tx today   Rv in 4 weeks with tx and labs       Tx today as scheduled     Rv scheduled for 3/31 @ 10:45 am with inject to follow    Pt was given AVS and appointment schedule    Electronically signed by Sosa Brown on 3/6/2023 at 8:11 AM

## 2023-03-31 ENCOUNTER — TELEPHONE (OUTPATIENT)
Dept: ONCOLOGY | Age: 53
End: 2023-03-31

## 2023-03-31 ENCOUNTER — HOSPITAL ENCOUNTER (OUTPATIENT)
Dept: INFUSION THERAPY | Age: 53
Discharge: HOME | End: 2023-03-31
Payer: MEDICARE

## 2023-03-31 ENCOUNTER — OFFICE VISIT (OUTPATIENT)
Dept: ONCOLOGY | Age: 53
End: 2023-03-31
Payer: MEDICARE

## 2023-03-31 VITALS
SYSTOLIC BLOOD PRESSURE: 120 MMHG | WEIGHT: 208.1 LBS | TEMPERATURE: 98.6 F | HEART RATE: 91 BPM | BODY MASS INDEX: 41.33 KG/M2 | DIASTOLIC BLOOD PRESSURE: 91 MMHG

## 2023-03-31 DIAGNOSIS — D3A.8 NEUROENDOCRINE TUMOR: Primary | ICD-10-CM

## 2023-03-31 DIAGNOSIS — R73.9 HYPERGLYCEMIA: ICD-10-CM

## 2023-03-31 DIAGNOSIS — Z79.899 HIGH RISK MEDICATION USE: ICD-10-CM

## 2023-03-31 DIAGNOSIS — D3A.8 PRIMARY PANCREATIC NEUROENDOCRINE TUMOR: ICD-10-CM

## 2023-03-31 DIAGNOSIS — K86.89 PANCREATIC MASS: Primary | ICD-10-CM

## 2023-03-31 LAB
ABSOLUTE EOS #: 0.4 K/UL (ref 0–0.4)
ABSOLUTE LYMPH #: 1.2 K/UL (ref 1–4.8)
ABSOLUTE MONO #: 0.2 K/UL (ref 0.1–1.2)
ALBUMIN SERPL-MCNC: 4.4 G/DL (ref 3.5–5.2)
ALBUMIN/GLOBULIN RATIO: 1.3 (ref 1–2.5)
ALP SERPL-CCNC: 194 U/L (ref 35–104)
ALT SERPL-CCNC: 94 U/L (ref 5–33)
ANION GAP SERPL CALCULATED.3IONS-SCNC: 14 MMOL/L (ref 9–17)
AST SERPL-CCNC: 96 U/L
BASOPHILS # BLD: 1 % (ref 0–2)
BASOPHILS ABSOLUTE: 0.1 K/UL (ref 0–0.2)
BILIRUB SERPL-MCNC: 0.7 MG/DL (ref 0.3–1.2)
BUN SERPL-MCNC: 15 MG/DL (ref 6–20)
CALCIUM SERPL-MCNC: 9.8 MG/DL (ref 8.6–10.4)
CHLORIDE SERPL-SCNC: 100 MMOL/L (ref 98–107)
CO2 SERPL-SCNC: 22 MMOL/L (ref 20–31)
CREAT SERPL-MCNC: 1.04 MG/DL (ref 0.5–0.9)
EOSINOPHILS RELATIVE PERCENT: 9 % (ref 1–4)
GFR SERPL CREATININE-BSD FRML MDRD: >60 ML/MIN/1.73M2
GLUCOSE SERPL-MCNC: 313 MG/DL (ref 70–99)
HCT VFR BLD AUTO: 40 % (ref 36–46)
HGB BLD-MCNC: 13.4 G/DL (ref 12–16)
LYMPHOCYTES # BLD: 24 % (ref 24–44)
MCH RBC QN AUTO: 31.3 PG (ref 26–34)
MCHC RBC AUTO-ENTMCNC: 33.6 G/DL (ref 31–37)
MCV RBC AUTO: 93.1 FL (ref 80–100)
MONOCYTES # BLD: 5 % (ref 2–11)
PDW BLD-RTO: 13.4 % (ref 12.5–15.4)
PLATELET # BLD AUTO: 219 K/UL (ref 140–450)
PMV BLD AUTO: 7.5 FL (ref 6–12)
POTASSIUM SERPL-SCNC: 4.1 MMOL/L (ref 3.7–5.3)
PROT SERPL-MCNC: 7.9 G/DL (ref 6.4–8.3)
RBC # BLD: 4.29 M/UL (ref 4–5.2)
SEG NEUTROPHILS: 61 % (ref 36–66)
SEGMENTED NEUTROPHILS ABSOLUTE COUNT: 2.9 K/UL (ref 1.8–7.7)
SODIUM SERPL-SCNC: 136 MMOL/L (ref 135–144)
WBC # BLD AUTO: 4.8 K/UL (ref 3.5–11)

## 2023-03-31 PROCEDURE — 1036F TOBACCO NON-USER: CPT | Performed by: INTERNAL MEDICINE

## 2023-03-31 PROCEDURE — 6360000002 HC RX W HCPCS: Performed by: INTERNAL MEDICINE

## 2023-03-31 PROCEDURE — G8484 FLU IMMUNIZE NO ADMIN: HCPCS | Performed by: INTERNAL MEDICINE

## 2023-03-31 PROCEDURE — G8417 CALC BMI ABV UP PARAM F/U: HCPCS | Performed by: INTERNAL MEDICINE

## 2023-03-31 PROCEDURE — 36415 COLL VENOUS BLD VENIPUNCTURE: CPT

## 2023-03-31 PROCEDURE — 3017F COLORECTAL CA SCREEN DOC REV: CPT | Performed by: INTERNAL MEDICINE

## 2023-03-31 PROCEDURE — 6370000000 HC RX 637 (ALT 250 FOR IP): Performed by: INTERNAL MEDICINE

## 2023-03-31 PROCEDURE — 99214 OFFICE O/P EST MOD 30 MIN: CPT | Performed by: INTERNAL MEDICINE

## 2023-03-31 PROCEDURE — 80053 COMPREHEN METABOLIC PANEL: CPT

## 2023-03-31 PROCEDURE — G8427 DOCREV CUR MEDS BY ELIG CLIN: HCPCS | Performed by: INTERNAL MEDICINE

## 2023-03-31 PROCEDURE — 85025 COMPLETE CBC W/AUTO DIFF WBC: CPT

## 2023-03-31 PROCEDURE — 99211 OFF/OP EST MAY X REQ PHY/QHP: CPT | Performed by: INTERNAL MEDICINE

## 2023-03-31 PROCEDURE — 96372 THER/PROPH/DIAG INJ SC/IM: CPT

## 2023-03-31 RX ORDER — DULOXETIN HYDROCHLORIDE 30 MG/1
30 CAPSULE, DELAYED RELEASE ORAL DAILY
COMMUNITY

## 2023-03-31 RX ORDER — ALBUTEROL SULFATE 90 UG/1
4 AEROSOL, METERED RESPIRATORY (INHALATION) PRN
OUTPATIENT
Start: 2023-04-28

## 2023-03-31 RX ORDER — ACETAMINOPHEN 325 MG/1
650 TABLET ORAL
OUTPATIENT
Start: 2023-04-28

## 2023-03-31 RX ORDER — DIPHENHYDRAMINE HYDROCHLORIDE 50 MG/ML
50 INJECTION INTRAMUSCULAR; INTRAVENOUS
OUTPATIENT
Start: 2023-04-28

## 2023-03-31 RX ORDER — ONDANSETRON 2 MG/ML
8 INJECTION INTRAMUSCULAR; INTRAVENOUS
OUTPATIENT
Start: 2023-04-28

## 2023-03-31 RX ORDER — LANREOTIDE ACETATE 120 MG/.5ML
120 INJECTION SUBCUTANEOUS ONCE
OUTPATIENT
Start: 2023-04-28 | End: 2023-04-28

## 2023-03-31 RX ORDER — EPINEPHRINE 1 MG/ML
0.3 INJECTION, SOLUTION, CONCENTRATE INTRAVENOUS PRN
OUTPATIENT
Start: 2023-04-28

## 2023-03-31 RX ORDER — SODIUM CHLORIDE 9 MG/ML
INJECTION, SOLUTION INTRAVENOUS CONTINUOUS
OUTPATIENT
Start: 2023-04-28

## 2023-03-31 RX ORDER — ASPIRIN 81 MG/1
81 TABLET, CHEWABLE ORAL DAILY
COMMUNITY

## 2023-03-31 RX ORDER — FAMOTIDINE 10 MG/ML
20 INJECTION, SOLUTION INTRAVENOUS
OUTPATIENT
Start: 2023-04-28

## 2023-03-31 RX ORDER — LANREOTIDE ACETATE 120 MG/.5ML
120 INJECTION SUBCUTANEOUS ONCE
Status: COMPLETED | OUTPATIENT
Start: 2023-03-31 | End: 2023-03-31

## 2023-03-31 RX ADMIN — INSULIN HUMAN 4 UNITS: 100 INJECTION, SOLUTION PARENTERAL at 12:11

## 2023-03-31 RX ADMIN — LANREOTIDE ACETATE 120 MG: 120 INJECTION SUBCUTANEOUS at 11:33

## 2023-03-31 NOTE — TELEPHONE ENCOUNTER
AVS from 3/31/23    Insulin regular 4 units      Rv in 4 week with tx       Rv scheduled for 4/28 @ 9:30 am with injection to follow    Pt was given AVS and appointment schedule    Electronically signed by Barby Quintana on 3/31/2023 at 2:41 PM

## 2023-03-31 NOTE — PROGRESS NOTES
detail. I discussed the natural history of the disease, prognosis, risks and goals of therapy and answered all the patients questions to the best of my ability. Patient expressed understanding and was in agreement. Kev Ontiveros MD            This note is created with the assistance of a speech recognition program.  While intending to generate a document that actually reflects the content of the visit, the document can still have some errors including those of syntax and sound a like substitutions which may escape proof reading. It such instances, actual meaning can be extrapolated by contextual diversion.

## 2023-03-31 NOTE — PROGRESS NOTES
Patient arrived for lanreotide   Seen by Dr Migdalia park to proceed plus give 4 units insulin sq  Tolerated w/o incident   Return 4/28  Peng Cote RN

## 2023-03-31 NOTE — TELEPHONE ENCOUNTER
Name: Magda Mahmood  : 1970  MRN: [id number]    Oncology Navigation Follow-Up Note    Contact Type:  Medical Oncology    Notes: Pt @ [facility] for Dr. Tameka Yen f/u & tx. Met w/pt prior to f/u. Pt denied questions/concerns. Encouraged to contact writer prn. Will continue to follow.     Electronically signed by Shannan Valadez RN on 3/31/2023 at 11:43 AM

## 2023-04-10 RX ORDER — GLIPIZIDE 5 MG/1
5 TABLET ORAL
Qty: 60 TABLET | Refills: 0 | Status: SHIPPED | OUTPATIENT
Start: 2023-04-10 | End: 2023-05-08

## 2023-04-24 ENCOUNTER — TELEPHONE (OUTPATIENT)
Dept: SURGERY | Age: 53
End: 2023-04-24

## 2023-04-24 NOTE — TELEPHONE ENCOUNTER
Patient called and informed patient that tomorrow's PET scan needs to be rescheduled due to patient feeling ill. Patient reports having sore throat and expressed she his having a hard time breathing sometimes. Writer encouraged patient to be seen if not breathing well. Patient informed writer that she will be calling her PCP for an appt. PET scan rescheduled and patient called and updated on date/time/location/instructions. Patient informed writer she is seeing PCP today. Encouraged patient to go to ER if breathing becomes worse. Patient voiced understanding and appreciation.

## 2023-04-26 ENCOUNTER — TELEPHONE (OUTPATIENT)
Dept: SURGERY | Age: 53
End: 2023-04-26

## 2023-04-26 NOTE — TELEPHONE ENCOUNTER
Called patient's pharmacy [facility] and informed staff that patient should only be taking Pantoprazole 40mg BID and not both Pantoprazole and omeprazole. Staff discontinued omeprazole and will also attempt to contact patient to inform of order.

## 2023-04-26 NOTE — TELEPHONE ENCOUNTER
Called patient at this time to clarify PPI medication due to receiving communication from pharmacy via fax. No answer, message left.

## 2023-04-28 ENCOUNTER — OFFICE VISIT (OUTPATIENT)
Dept: ONCOLOGY | Age: 53
End: 2023-04-28
Payer: MEDICARE

## 2023-04-28 ENCOUNTER — HOSPITAL ENCOUNTER (OUTPATIENT)
Dept: INFUSION THERAPY | Age: 53
Discharge: HOME | End: 2023-04-28
Payer: MEDICARE

## 2023-04-28 VITALS
RESPIRATION RATE: 19 BRPM | HEART RATE: 83 BPM | TEMPERATURE: 97.6 F | OXYGEN SATURATION: 97 % | DIASTOLIC BLOOD PRESSURE: 86 MMHG | BODY MASS INDEX: 41.54 KG/M2 | SYSTOLIC BLOOD PRESSURE: 120 MMHG | WEIGHT: 209.19 LBS

## 2023-04-28 DIAGNOSIS — D3A.8 PRIMARY PANCREATIC NEUROENDOCRINE TUMOR: Primary | ICD-10-CM

## 2023-04-28 DIAGNOSIS — Z79.899 HIGH RISK MEDICATION USE: ICD-10-CM

## 2023-04-28 DIAGNOSIS — K76.0 HEPATIC STEATOSIS: ICD-10-CM

## 2023-04-28 LAB
ABSOLUTE EOS #: 0.3 K/UL (ref 0–0.4)
ABSOLUTE LYMPH #: 1.2 K/UL (ref 1–4.8)
ABSOLUTE MONO #: 0.3 K/UL (ref 0.1–1.2)
ALBUMIN SERPL-MCNC: 4.5 G/DL (ref 3.5–5.2)
ALBUMIN/GLOBULIN RATIO: 1.3 (ref 1–2.5)
ALP SERPL-CCNC: 167 U/L (ref 35–104)
ALT SERPL-CCNC: 58 U/L (ref 5–33)
ANION GAP SERPL CALCULATED.3IONS-SCNC: 16 MMOL/L (ref 9–17)
AST SERPL-CCNC: 53 U/L
BASOPHILS # BLD: 1 % (ref 0–2)
BASOPHILS ABSOLUTE: 0.1 K/UL (ref 0–0.2)
BILIRUB SERPL-MCNC: 0.6 MG/DL (ref 0.3–1.2)
BUN SERPL-MCNC: 14 MG/DL (ref 6–20)
CALCIUM SERPL-MCNC: 9.4 MG/DL (ref 8.6–10.4)
CHLORIDE SERPL-SCNC: 98 MMOL/L (ref 98–107)
CO2 SERPL-SCNC: 23 MMOL/L (ref 20–31)
CREAT SERPL-MCNC: 1.02 MG/DL (ref 0.5–0.9)
EOSINOPHILS RELATIVE PERCENT: 5 % (ref 1–4)
GFR SERPL CREATININE-BSD FRML MDRD: >60 ML/MIN/1.73M2
GLUCOSE SERPL-MCNC: 303 MG/DL (ref 70–99)
HCT VFR BLD AUTO: 38.6 % (ref 36–46)
HGB BLD-MCNC: 13.2 G/DL (ref 12–16)
LYMPHOCYTES # BLD: 22 % (ref 24–44)
MCH RBC QN AUTO: 32.1 PG (ref 26–34)
MCHC RBC AUTO-ENTMCNC: 34.2 G/DL (ref 31–37)
MCV RBC AUTO: 94 FL (ref 80–100)
MONOCYTES # BLD: 6 % (ref 2–11)
PDW BLD-RTO: 13 % (ref 12.5–15.4)
PLATELET # BLD AUTO: 226 K/UL (ref 140–450)
PMV BLD AUTO: 7.6 FL (ref 6–12)
POTASSIUM SERPL-SCNC: 4.4 MMOL/L (ref 3.7–5.3)
PROT SERPL-MCNC: 8 G/DL (ref 6.4–8.3)
RBC # BLD: 4.11 M/UL (ref 4–5.2)
SEG NEUTROPHILS: 66 % (ref 36–66)
SEGMENTED NEUTROPHILS ABSOLUTE COUNT: 3.6 K/UL (ref 1.8–7.7)
SODIUM SERPL-SCNC: 137 MMOL/L (ref 135–144)
WBC # BLD AUTO: 5.4 K/UL (ref 3.5–11)

## 2023-04-28 PROCEDURE — 6370000000 HC RX 637 (ALT 250 FOR IP): Performed by: INTERNAL MEDICINE

## 2023-04-28 PROCEDURE — 99214 OFFICE O/P EST MOD 30 MIN: CPT | Performed by: INTERNAL MEDICINE

## 2023-04-28 PROCEDURE — 3017F COLORECTAL CA SCREEN DOC REV: CPT | Performed by: INTERNAL MEDICINE

## 2023-04-28 PROCEDURE — 6360000002 HC RX W HCPCS: Performed by: INTERNAL MEDICINE

## 2023-04-28 PROCEDURE — 1036F TOBACCO NON-USER: CPT | Performed by: INTERNAL MEDICINE

## 2023-04-28 PROCEDURE — 36415 COLL VENOUS BLD VENIPUNCTURE: CPT

## 2023-04-28 PROCEDURE — 85025 COMPLETE CBC W/AUTO DIFF WBC: CPT

## 2023-04-28 PROCEDURE — G8427 DOCREV CUR MEDS BY ELIG CLIN: HCPCS | Performed by: INTERNAL MEDICINE

## 2023-04-28 PROCEDURE — 99211 OFF/OP EST MAY X REQ PHY/QHP: CPT | Performed by: INTERNAL MEDICINE

## 2023-04-28 PROCEDURE — 96372 THER/PROPH/DIAG INJ SC/IM: CPT

## 2023-04-28 PROCEDURE — G8417 CALC BMI ABV UP PARAM F/U: HCPCS | Performed by: INTERNAL MEDICINE

## 2023-04-28 PROCEDURE — 80053 COMPREHEN METABOLIC PANEL: CPT

## 2023-04-28 RX ORDER — ONDANSETRON 2 MG/ML
8 INJECTION INTRAMUSCULAR; INTRAVENOUS
OUTPATIENT
Start: 2023-05-26

## 2023-04-28 RX ORDER — ALBUTEROL SULFATE 90 UG/1
4 AEROSOL, METERED RESPIRATORY (INHALATION) PRN
OUTPATIENT
Start: 2023-05-26

## 2023-04-28 RX ORDER — SODIUM CHLORIDE 9 MG/ML
INJECTION, SOLUTION INTRAVENOUS CONTINUOUS
OUTPATIENT
Start: 2023-05-26

## 2023-04-28 RX ORDER — LANREOTIDE ACETATE 120 MG/.5ML
120 INJECTION SUBCUTANEOUS ONCE
Status: COMPLETED | OUTPATIENT
Start: 2023-04-28 | End: 2023-04-28

## 2023-04-28 RX ORDER — EPINEPHRINE 1 MG/ML
0.3 INJECTION, SOLUTION, CONCENTRATE INTRAVENOUS PRN
OUTPATIENT
Start: 2023-05-26

## 2023-04-28 RX ORDER — LANREOTIDE ACETATE 120 MG/.5ML
120 INJECTION SUBCUTANEOUS ONCE
OUTPATIENT
Start: 2023-05-26 | End: 2023-05-26

## 2023-04-28 RX ORDER — DIPHENHYDRAMINE HYDROCHLORIDE 50 MG/ML
50 INJECTION INTRAMUSCULAR; INTRAVENOUS
OUTPATIENT
Start: 2023-05-26

## 2023-04-28 RX ORDER — FAMOTIDINE 10 MG/ML
20 INJECTION, SOLUTION INTRAVENOUS
OUTPATIENT
Start: 2023-05-26

## 2023-04-28 RX ORDER — ACETAMINOPHEN 325 MG/1
650 TABLET ORAL
OUTPATIENT
Start: 2023-05-26

## 2023-04-28 RX ADMIN — INSULIN HUMAN 6 UNITS: 100 INJECTION, SOLUTION PARENTERAL at 12:05

## 2023-04-28 RX ADMIN — LANREOTIDE ACETATE 120 MG: 120 INJECTION SUBCUTANEOUS at 11:45

## 2023-04-28 NOTE — PROGRESS NOTES
Patient arrived for lanreotide   Seen by Dr Weston Angry prior   Glucose 303 - 6 units insulin ordered  Tolerated w/o incident   Return 5/26  and tx  Elo Quick, RN

## 2023-04-28 NOTE — PROGRESS NOTES
Sudha Subramanian                                                                                                                  4/28/2023  MRN:   [id number]  YOB: 1970  PCP:                           Rashad Hilton MD  Referring Physician: No ref. provider found  Treating Physician Name: Rosa Bell MD      Reason for visit:  Chief Complaint   Patient presents with    Follow-up     Review disease process. Toxicity check. Discussed treatment plan. Current problems:  Well-differentiated, grade 1, pancreatic neuroendocrine tumor, clinical stage T2 N0 M0  Elevated gastrin (patient on Protonix)  SMA focal severe stenosis, chronic per CT scan  Multiple comorbidities including diabetes mellitus obesity, renal insufficiency and severe hepatic steatosis  Family history of leukemia     Active and recent treatments:  Patient medically unfit for surgery  Lanreotide-11/2022    Interim History:  Patient presents to the clinic for a follow-up visit and for toxicity check and to review results of her blood work-up. Patient is accompanied by her daughter. Patient was recently seen by her primary care physician. He is struggling to keep her blood glucose in good control. She is scheduled for treatment with lanreotide today. Patient also has a CT PET coming up. Complains of a sore throat    During this visit patient's allergy, social, medical, surgical history and medications were reviewed and updated. Summary of Case/History:    Sudha Subramanian a 46 y. o.female is a patient who presents to the clinic to establish care and for further work-up and evaluation. Patient has multiple comorbidities including diabetes COPD hypertension morbid obesity. Patient initially presented to the emergency department with complaints of right lower quadrant pain radiating to the back. Work-up revealed acute kidney injury pyelonephritis.   Patient CT scan without contrast also showed a possible solid-appearing lesion

## 2023-05-01 ENCOUNTER — TELEPHONE (OUTPATIENT)
Dept: ONCOLOGY | Age: 53
End: 2023-05-01

## 2023-05-01 NOTE — TELEPHONE ENCOUNTER
AVS from 4/28/23      Cbc and cmp now and on rv in 4 weeks  Tx today     Rv scheduled for 5/26 @ 9:30 am     Injection to follow    Pt was given AVS and appointment schedule    Electronically signed by Nikkie Pfeiffer on 5/1/2023 at 8:17 AM

## 2023-05-03 ENCOUNTER — HOSPITAL ENCOUNTER (OUTPATIENT)
Dept: NUCLEAR MEDICINE | Age: 53
Discharge: HOME | End: 2023-05-05
Payer: MEDICARE

## 2023-05-03 PROCEDURE — A9592 HC RX DIAGNOSTIC RADIOPHARMACEUTICAL: HCPCS | Performed by: SURGERY

## 2023-05-03 PROCEDURE — 3430000000 HC RX DIAGNOSTIC RADIOPHARMACEUTICAL: Performed by: SURGERY

## 2023-05-03 PROCEDURE — 2580000003 HC RX 258: Performed by: SURGERY

## 2023-05-03 PROCEDURE — 78815 PET IMAGE W/CT SKULL-THIGH: CPT

## 2023-05-03 RX ORDER — SODIUM CHLORIDE 0.9 % (FLUSH) 0.9 %
10 SYRINGE (ML) INJECTION PRN
Status: DISCONTINUED | OUTPATIENT
Start: 2023-05-03 | End: 2023-05-06 | Stop reason: HOSPADM

## 2023-05-03 RX ADMIN — COPPER CU 64 DOTATATE 4.21 MILLICURIE: 1 INJECTION, SOLUTION INTRAVENOUS at 15:00

## 2023-05-03 RX ADMIN — SODIUM CHLORIDE, PRESERVATIVE FREE 10 ML: 5 INJECTION INTRAVENOUS at 15:00

## 2023-05-08 ENCOUNTER — TELEPHONE (OUTPATIENT)
Dept: SURGERY | Age: 53
End: 2023-05-08

## 2023-05-08 RX ORDER — GLIPIZIDE 5 MG/1
5 TABLET ORAL
Qty: 60 TABLET | Refills: 0 | Status: SHIPPED | OUTPATIENT
Start: 2023-05-08

## 2023-05-08 NOTE — TELEPHONE ENCOUNTER
Spoke with patient about getting labs prior to appt. Patient to come in early tomorrow to get labs completed.

## 2023-05-09 ENCOUNTER — HOSPITAL ENCOUNTER (OUTPATIENT)
Age: 53
Discharge: HOME | End: 2023-05-09
Payer: MEDICARE

## 2023-05-09 ENCOUNTER — OFFICE VISIT (OUTPATIENT)
Dept: SURGERY | Age: 53
End: 2023-05-09
Payer: MEDICARE

## 2023-05-09 VITALS
WEIGHT: 210.8 LBS | SYSTOLIC BLOOD PRESSURE: 125 MMHG | HEIGHT: 60 IN | HEART RATE: 83 BPM | DIASTOLIC BLOOD PRESSURE: 82 MMHG | BODY MASS INDEX: 41.39 KG/M2

## 2023-05-09 DIAGNOSIS — E66.9 DIABETES MELLITUS TYPE 2 IN OBESE (HCC): ICD-10-CM

## 2023-05-09 DIAGNOSIS — C7B.8 SECONDARY NEUROENDOCRINE TUMORS (HCC): ICD-10-CM

## 2023-05-09 DIAGNOSIS — E11.69 DIABETES MELLITUS TYPE 2 IN OBESE (HCC): ICD-10-CM

## 2023-05-09 DIAGNOSIS — K55.1 SUPERIOR MESENTERIC ARTERY STENOSIS (HCC): Primary | ICD-10-CM

## 2023-05-09 DIAGNOSIS — R93.3 ABNORMAL FINDINGS ON DIAGNOSTIC IMAGING OF OTHER PARTS OF DIGESTIVE TRACT: ICD-10-CM

## 2023-05-09 DIAGNOSIS — D3A.8 PRIMARY PANCREATIC NEUROENDOCRINE TUMOR: ICD-10-CM

## 2023-05-09 LAB
ABSOLUTE EOS #: 0.3 K/UL (ref 0–0.4)
ABSOLUTE LYMPH #: 1.3 K/UL (ref 1–4.8)
ABSOLUTE MONO #: 0.3 K/UL (ref 0.1–1.2)
ALBUMIN SERPL-MCNC: 4.2 G/DL (ref 3.5–5.2)
ALBUMIN/GLOBULIN RATIO: 1.2 (ref 1–2.5)
ALP SERPL-CCNC: 161 U/L (ref 35–104)
ALT SERPL-CCNC: 61 U/L (ref 5–33)
ANION GAP SERPL CALCULATED.3IONS-SCNC: 12 MMOL/L (ref 9–17)
AST SERPL-CCNC: 55 U/L
BASOPHILS # BLD: 1 % (ref 0–2)
BASOPHILS ABSOLUTE: 0.1 K/UL (ref 0–0.2)
BILIRUB SERPL-MCNC: 0.4 MG/DL (ref 0.3–1.2)
BUN SERPL-MCNC: 14 MG/DL (ref 6–20)
CALCIUM SERPL-MCNC: 9.8 MG/DL (ref 8.6–10.4)
CHLORIDE SERPL-SCNC: 102 MMOL/L (ref 98–107)
CO2 SERPL-SCNC: 24 MMOL/L (ref 20–31)
CREAT SERPL-MCNC: 0.96 MG/DL (ref 0.5–0.9)
EOSINOPHILS RELATIVE PERCENT: 7 % (ref 1–4)
GFR SERPL CREATININE-BSD FRML MDRD: >60 ML/MIN/1.73M2
GLUCOSE SERPL-MCNC: 291 MG/DL (ref 70–99)
HCT VFR BLD AUTO: 38 % (ref 36–46)
HGB BLD-MCNC: 12.9 G/DL (ref 12–16)
INR PPP: 0.9
LYMPHOCYTES # BLD: 27 % (ref 24–44)
MAGNESIUM SERPL-MCNC: 1.7 MG/DL (ref 1.6–2.6)
MCH RBC QN AUTO: 31.7 PG (ref 26–34)
MCHC RBC AUTO-ENTMCNC: 33.9 G/DL (ref 31–37)
MCV RBC AUTO: 93.6 FL (ref 80–100)
MONOCYTES # BLD: 6 % (ref 2–11)
PDW BLD-RTO: 13.2 % (ref 12.5–15.4)
PHOSPHATE SERPL-MCNC: 3.9 MG/DL (ref 2.6–4.5)
PLATELET # BLD AUTO: 228 K/UL (ref 140–450)
PMV BLD AUTO: 7.3 FL (ref 6–12)
POTASSIUM SERPL-SCNC: 4.4 MMOL/L (ref 3.7–5.3)
PREALB SERPL-MCNC: 17.4 MG/DL (ref 20–40)
PROT SERPL-MCNC: 7.8 G/DL (ref 6.4–8.3)
PROTHROMBIN TIME: 10 SEC (ref 9.4–12.6)
RBC # BLD: 4.06 M/UL (ref 4–5.2)
SEG NEUTROPHILS: 59 % (ref 36–66)
SEGMENTED NEUTROPHILS ABSOLUTE COUNT: 2.7 K/UL (ref 1.8–7.7)
SODIUM SERPL-SCNC: 138 MMOL/L (ref 135–144)
WBC # BLD AUTO: 4.6 K/UL (ref 3.5–11)

## 2023-05-09 PROCEDURE — 84100 ASSAY OF PHOSPHORUS: CPT

## 2023-05-09 PROCEDURE — 85610 PROTHROMBIN TIME: CPT

## 2023-05-09 PROCEDURE — 85025 COMPLETE CBC W/AUTO DIFF WBC: CPT

## 2023-05-09 PROCEDURE — G8427 DOCREV CUR MEDS BY ELIG CLIN: HCPCS | Performed by: SURGERY

## 2023-05-09 PROCEDURE — 1036F TOBACCO NON-USER: CPT | Performed by: SURGERY

## 2023-05-09 PROCEDURE — 36415 COLL VENOUS BLD VENIPUNCTURE: CPT

## 2023-05-09 PROCEDURE — 3017F COLORECTAL CA SCREEN DOC REV: CPT | Performed by: SURGERY

## 2023-05-09 PROCEDURE — 3046F HEMOGLOBIN A1C LEVEL >9.0%: CPT | Performed by: SURGERY

## 2023-05-09 PROCEDURE — 80053 COMPREHEN METABOLIC PANEL: CPT

## 2023-05-09 PROCEDURE — 82941 ASSAY OF GASTRIN: CPT

## 2023-05-09 PROCEDURE — 99213 OFFICE O/P EST LOW 20 MIN: CPT | Performed by: SURGERY

## 2023-05-09 PROCEDURE — 84134 ASSAY OF PREALBUMIN: CPT

## 2023-05-09 PROCEDURE — 83735 ASSAY OF MAGNESIUM: CPT

## 2023-05-09 PROCEDURE — 2022F DILAT RTA XM EVC RTNOPTHY: CPT | Performed by: SURGERY

## 2023-05-09 PROCEDURE — G8417 CALC BMI ABV UP PARAM F/U: HCPCS | Performed by: SURGERY

## 2023-05-09 RX ORDER — OXYBUTYNIN CHLORIDE 5 MG/1
5 TABLET ORAL 2 TIMES DAILY
COMMUNITY
Start: 2023-03-27

## 2023-05-09 RX ORDER — LORATADINE 10 MG/1
10 TABLET ORAL DAILY
COMMUNITY
Start: 2023-03-27

## 2023-05-09 RX ORDER — GLIMEPIRIDE 4 MG/1
TABLET ORAL
COMMUNITY
Start: 2023-03-27

## 2023-05-09 ASSESSMENT — ENCOUNTER SYMPTOMS
BACK PAIN: 0
DIARRHEA: 0
CHEST TIGHTNESS: 0
VOMITING: 0
WHEEZING: 0
BLOOD IN STOOL: 0
SHORTNESS OF BREATH: 0
COUGH: 0
NAUSEA: 0
ABDOMINAL DISTENTION: 0
ABDOMINAL PAIN: 0
CONSTIPATION: 0

## 2023-05-09 NOTE — PROGRESS NOTES
Review of Systems   Constitutional:  Positive for fatigue. Negative for chills, fever and unexpected weight change. Eyes:  Negative for visual disturbance. Respiratory:  Negative for cough, chest tightness, shortness of breath and wheezing. Cardiovascular:  Negative for chest pain, palpitations and leg swelling. Gastrointestinal:  Negative for abdominal distention, abdominal pain, blood in stool, constipation, diarrhea, nausea and vomiting. Genitourinary:  Negative for dysuria, hematuria and urgency. Musculoskeletal:  Negative for back pain. Skin:  Negative for rash. Neurological:  Negative for dizziness, seizures, syncope, speech difficulty, weakness, light-headedness, numbness and headaches. Hematological:  Negative for adenopathy. Bruises/bleeds easily. Psychiatric/Behavioral:  The patient is not nervous/anxious.

## 2023-05-11 LAB — GASTRIN: 596 PG/ML (ref 0–100)

## 2023-05-26 ENCOUNTER — OFFICE VISIT (OUTPATIENT)
Dept: ONCOLOGY | Age: 53
End: 2023-05-26
Payer: MEDICARE

## 2023-05-26 ENCOUNTER — HOSPITAL ENCOUNTER (OUTPATIENT)
Dept: INFUSION THERAPY | Age: 53
Discharge: HOME | End: 2023-05-26
Payer: MEDICARE

## 2023-05-26 VITALS
WEIGHT: 207.4 LBS | BODY MASS INDEX: 41.19 KG/M2 | OXYGEN SATURATION: 98 % | DIASTOLIC BLOOD PRESSURE: 87 MMHG | RESPIRATION RATE: 16 BRPM | TEMPERATURE: 97.6 F | SYSTOLIC BLOOD PRESSURE: 124 MMHG | HEART RATE: 70 BPM

## 2023-05-26 DIAGNOSIS — K76.0 HEPATIC STEATOSIS: ICD-10-CM

## 2023-05-26 DIAGNOSIS — D3A.8 PRIMARY PANCREATIC NEUROENDOCRINE TUMOR: Primary | ICD-10-CM

## 2023-05-26 DIAGNOSIS — Z79.899 HIGH RISK MEDICATION USE: ICD-10-CM

## 2023-05-26 DIAGNOSIS — D3A.8 NEUROENDOCRINE TUMOR: ICD-10-CM

## 2023-05-26 DIAGNOSIS — R73.9 HYPERGLYCEMIA: ICD-10-CM

## 2023-05-26 LAB
ALBUMIN SERPL-MCNC: 4.2 G/DL (ref 3.5–5.2)
ALBUMIN/GLOB SERPL: 1.3 {RATIO} (ref 1–2.5)
ALP SERPL-CCNC: 165 U/L (ref 35–104)
ALT SERPL-CCNC: 55 U/L (ref 5–33)
ANION GAP SERPL CALCULATED.3IONS-SCNC: 13 MMOL/L (ref 9–17)
AST SERPL-CCNC: 52 U/L
BASOPHILS # BLD: 0 K/UL (ref 0–0.2)
BASOPHILS NFR BLD: 1 % (ref 0–2)
BILIRUB SERPL-MCNC: 0.5 MG/DL (ref 0.3–1.2)
BUN SERPL-MCNC: 11 MG/DL (ref 6–20)
CALCIUM SERPL-MCNC: 9.6 MG/DL (ref 8.6–10.4)
CHLORIDE SERPL-SCNC: 105 MMOL/L (ref 98–107)
CO2 SERPL-SCNC: 23 MMOL/L (ref 20–31)
CREAT SERPL-MCNC: 0.95 MG/DL (ref 0.5–0.9)
EOSINOPHIL # BLD: 0.4 K/UL (ref 0–0.4)
EOSINOPHILS RELATIVE PERCENT: 8 % (ref 1–4)
ERYTHROCYTE [DISTWIDTH] IN BLOOD BY AUTOMATED COUNT: 13.3 % (ref 12.5–15.4)
GFR SERPL CREATININE-BSD FRML MDRD: >60 ML/MIN/1.73M2
GLUCOSE SERPL-MCNC: 262 MG/DL (ref 70–99)
HCT VFR BLD AUTO: 38 % (ref 36–46)
HGB BLD-MCNC: 12.7 G/DL (ref 12–16)
LYMPHOCYTES # BLD: 24 % (ref 24–44)
LYMPHOCYTES NFR BLD: 1.1 K/UL (ref 1–4.8)
MCH RBC QN AUTO: 31 PG (ref 26–34)
MCHC RBC AUTO-ENTMCNC: 33.3 G/DL (ref 31–37)
MCV RBC AUTO: 93.1 FL (ref 80–100)
MONOCYTES NFR BLD: 0.3 K/UL (ref 0.1–1.2)
MONOCYTES NFR BLD: 6 % (ref 2–11)
NEUTROPHILS NFR BLD: 61 % (ref 36–66)
NEUTS SEG NFR BLD: 2.9 K/UL (ref 1.8–7.7)
PLATELET # BLD AUTO: 200 K/UL (ref 140–450)
PMV BLD AUTO: 7 FL (ref 6–12)
POTASSIUM SERPL-SCNC: 4.3 MMOL/L (ref 3.7–5.3)
PROT SERPL-MCNC: 7.5 G/DL (ref 6.4–8.3)
RBC # BLD AUTO: 4.08 M/UL (ref 4–5.2)
SODIUM SERPL-SCNC: 141 MMOL/L (ref 135–144)
WBC OTHER # BLD: 4.7 K/UL (ref 3.5–11)

## 2023-05-26 PROCEDURE — 99214 OFFICE O/P EST MOD 30 MIN: CPT | Performed by: INTERNAL MEDICINE

## 2023-05-26 PROCEDURE — 6370000000 HC RX 637 (ALT 250 FOR IP): Performed by: INTERNAL MEDICINE

## 2023-05-26 PROCEDURE — 36415 COLL VENOUS BLD VENIPUNCTURE: CPT

## 2023-05-26 PROCEDURE — G8417 CALC BMI ABV UP PARAM F/U: HCPCS | Performed by: INTERNAL MEDICINE

## 2023-05-26 PROCEDURE — 85025 COMPLETE CBC W/AUTO DIFF WBC: CPT

## 2023-05-26 PROCEDURE — 3017F COLORECTAL CA SCREEN DOC REV: CPT | Performed by: INTERNAL MEDICINE

## 2023-05-26 PROCEDURE — 6360000002 HC RX W HCPCS: Performed by: INTERNAL MEDICINE

## 2023-05-26 PROCEDURE — 80053 COMPREHEN METABOLIC PANEL: CPT

## 2023-05-26 PROCEDURE — 96372 THER/PROPH/DIAG INJ SC/IM: CPT

## 2023-05-26 PROCEDURE — 1036F TOBACCO NON-USER: CPT | Performed by: INTERNAL MEDICINE

## 2023-05-26 PROCEDURE — 99211 OFF/OP EST MAY X REQ PHY/QHP: CPT | Performed by: INTERNAL MEDICINE

## 2023-05-26 PROCEDURE — G8427 DOCREV CUR MEDS BY ELIG CLIN: HCPCS | Performed by: INTERNAL MEDICINE

## 2023-05-26 RX ORDER — EPINEPHRINE 1 MG/ML
0.3 INJECTION, SOLUTION, CONCENTRATE INTRAVENOUS PRN
OUTPATIENT
Start: 2023-06-23

## 2023-05-26 RX ORDER — FAMOTIDINE 10 MG/ML
20 INJECTION, SOLUTION INTRAVENOUS
OUTPATIENT
Start: 2023-06-23

## 2023-05-26 RX ORDER — ALBUTEROL SULFATE 90 UG/1
4 AEROSOL, METERED RESPIRATORY (INHALATION) PRN
OUTPATIENT
Start: 2023-06-23

## 2023-05-26 RX ORDER — DIPHENHYDRAMINE HYDROCHLORIDE 50 MG/ML
50 INJECTION INTRAMUSCULAR; INTRAVENOUS
OUTPATIENT
Start: 2023-06-23

## 2023-05-26 RX ORDER — ACETAMINOPHEN 325 MG/1
650 TABLET ORAL
OUTPATIENT
Start: 2023-06-23

## 2023-05-26 RX ORDER — ONDANSETRON 2 MG/ML
8 INJECTION INTRAMUSCULAR; INTRAVENOUS
OUTPATIENT
Start: 2023-06-23

## 2023-05-26 RX ORDER — LANREOTIDE ACETATE 120 MG/.5ML
120 INJECTION SUBCUTANEOUS ONCE
Status: COMPLETED | OUTPATIENT
Start: 2023-05-26 | End: 2023-05-26

## 2023-05-26 RX ORDER — LANREOTIDE ACETATE 120 MG/.5ML
120 INJECTION SUBCUTANEOUS ONCE
OUTPATIENT
Start: 2023-06-23 | End: 2023-06-23

## 2023-05-26 RX ORDER — SODIUM CHLORIDE 9 MG/ML
INJECTION, SOLUTION INTRAVENOUS CONTINUOUS
OUTPATIENT
Start: 2023-06-23

## 2023-05-26 RX ADMIN — INSULIN HUMAN 5 UNITS: 100 INJECTION, SOLUTION PARENTERAL at 11:21

## 2023-05-26 RX ADMIN — LANREOTIDE ACETATE 120 MG: 120 INJECTION SUBCUTANEOUS at 11:21

## 2023-05-26 NOTE — PROGRESS NOTES
Patient here for lanreotide injection. Arrives ambulatory. Patient was seen by Dr. Bruce Robert, order received to proceed with treatment to and give 5 units regular insulin. Injections complete without incident. Discharged in stable condition. Returns 6/23/2023 for office visit and lanreotide.

## 2023-05-26 NOTE — PROGRESS NOTES
Keron Mejía                                                                                                                  5/26/2023  MRN:   [id number]  YOB: 1970  PCP:                           Gustavo Foley MD  Referring Physician: No ref. provider found  Treating Physician Name: Pravin Gillette MD      Reason for visit:  Chief Complaint   Patient presents with    Follow-up   Patient presents to the clinic for toxicity check and to discuss results of lab work-up, imaging studies and further treatment plan. Current problems:  Well-differentiated, grade 1, pancreatic neuroendocrine tumor, clinical stage T2 N0 M0  Elevated gastrin (patient on Protonix)  SMA focal severe stenosis, chronic per CT scan  Multiple comorbidities including diabetes mellitus obesity, renal insufficiency and severe hepatic steatosis  Family history of leukemia     Active and recent treatments:  Patient medically unfit for surgery  Lanreotide-11/2022    Interim History:  Patient presents to the clinic for a follow-up visit and for toxicity check and to review results of her blood work-up. Underwent CT PET does not show any evidence of disease progression still shows FDG uptake in the pancreatic lesion. Patient glucose remains high. During this visit patient's allergy, social, medical, surgical history and medications were reviewed and updated. Summary of Case/History:    Keron Mejía a 46 y. o.female is a patient who presents to the clinic to establish care and for further work-up and evaluation. Patient has multiple comorbidities including diabetes COPD hypertension morbid obesity. Patient initially presented to the emergency department with complaints of right lower quadrant pain radiating to the back. Work-up revealed acute kidney injury pyelonephritis.   Patient CT scan without contrast also showed a possible solid-appearing lesion in the pancreatic uncinate process there was no biliary or pancreatic

## 2023-05-31 ENCOUNTER — TELEPHONE (OUTPATIENT)
Dept: ONCOLOGY | Age: 53
End: 2023-05-31

## 2023-05-31 NOTE — TELEPHONE ENCOUNTER
Name: Crys Guthrie  : 1970  MRN: [id number]    Oncology Navigation Follow-Up Note    Contact Type:  Telephone    Notes: Attempted to contact pt for f/u call, no answer, VM left encouraged pt to contact writer prn. Will continue to follow.     Electronically signed by Venu Bender RN on 2023 at 10:39 AM T

## 2023-06-06 RX ORDER — GLIPIZIDE 5 MG/1
TABLET ORAL
Qty: 60 TABLET | Refills: 0 | OUTPATIENT
Start: 2023-06-06

## 2023-06-23 ENCOUNTER — OFFICE VISIT (OUTPATIENT)
Dept: ONCOLOGY | Age: 53
End: 2023-06-23
Payer: MEDICARE

## 2023-06-23 ENCOUNTER — HOSPITAL ENCOUNTER (OUTPATIENT)
Dept: INFUSION THERAPY | Age: 53
Discharge: HOME | End: 2023-06-23
Payer: MEDICARE

## 2023-06-23 ENCOUNTER — TELEPHONE (OUTPATIENT)
Dept: ONCOLOGY | Age: 53
End: 2023-06-23

## 2023-06-23 VITALS
SYSTOLIC BLOOD PRESSURE: 138 MMHG | DIASTOLIC BLOOD PRESSURE: 92 MMHG | WEIGHT: 205.4 LBS | BODY MASS INDEX: 40.79 KG/M2 | HEART RATE: 73 BPM | TEMPERATURE: 97.4 F

## 2023-06-23 DIAGNOSIS — R73.9 HYPERGLYCEMIA: ICD-10-CM

## 2023-06-23 DIAGNOSIS — D3A.8 PRIMARY PANCREATIC NEUROENDOCRINE TUMOR: Primary | ICD-10-CM

## 2023-06-23 DIAGNOSIS — Z79.899 HIGH RISK MEDICATION USE: ICD-10-CM

## 2023-06-23 DIAGNOSIS — D3A.8 NEUROENDOCRINE TUMOR: ICD-10-CM

## 2023-06-23 DIAGNOSIS — K76.0 HEPATIC STEATOSIS: ICD-10-CM

## 2023-06-23 LAB
ALBUMIN SERPL-MCNC: 4.3 G/DL (ref 3.5–5.2)
ALBUMIN/GLOB SERPL: 1.4 {RATIO} (ref 1–2.5)
ALP SERPL-CCNC: 154 U/L (ref 35–104)
ALT SERPL-CCNC: 51 U/L (ref 5–33)
ANION GAP SERPL CALCULATED.3IONS-SCNC: 13 MMOL/L (ref 9–17)
AST SERPL-CCNC: 42 U/L
BASOPHILS # BLD: 0 K/UL (ref 0–0.2)
BASOPHILS NFR BLD: 1 % (ref 0–2)
BILIRUB SERPL-MCNC: 0.6 MG/DL (ref 0.3–1.2)
BUN SERPL-MCNC: 14 MG/DL (ref 6–20)
CALCIUM SERPL-MCNC: 9.5 MG/DL (ref 8.6–10.4)
CHLORIDE SERPL-SCNC: 101 MMOL/L (ref 98–107)
CO2 SERPL-SCNC: 22 MMOL/L (ref 20–31)
CREAT SERPL-MCNC: 0.89 MG/DL (ref 0.5–0.9)
EOSINOPHIL # BLD: 0.3 K/UL (ref 0–0.4)
EOSINOPHILS RELATIVE PERCENT: 6 % (ref 1–4)
ERYTHROCYTE [DISTWIDTH] IN BLOOD BY AUTOMATED COUNT: 12.8 % (ref 12.5–15.4)
GFR SERPL CREATININE-BSD FRML MDRD: >60 ML/MIN/1.73M2
GLUCOSE SERPL-MCNC: 337 MG/DL (ref 70–99)
HCT VFR BLD AUTO: 37.2 % (ref 36–46)
HGB BLD-MCNC: 12.7 G/DL (ref 12–16)
LYMPHOCYTES # BLD: 28 % (ref 24–44)
LYMPHOCYTES NFR BLD: 1.2 K/UL (ref 1–4.8)
MCH RBC QN AUTO: 31.6 PG (ref 26–34)
MCHC RBC AUTO-ENTMCNC: 34.1 G/DL (ref 31–37)
MCV RBC AUTO: 92.6 FL (ref 80–100)
MONOCYTES NFR BLD: 0.3 K/UL (ref 0.1–1.2)
MONOCYTES NFR BLD: 7 % (ref 2–11)
NEUTROPHILS NFR BLD: 58 % (ref 36–66)
NEUTS SEG NFR BLD: 2.5 K/UL (ref 1.8–7.7)
PLATELET # BLD AUTO: 210 K/UL (ref 140–450)
PMV BLD AUTO: 7.4 FL (ref 6–12)
POTASSIUM SERPL-SCNC: 4.1 MMOL/L (ref 3.7–5.3)
PROT SERPL-MCNC: 7.4 G/DL (ref 6.4–8.3)
RBC # BLD AUTO: 4.02 M/UL (ref 4–5.2)
SODIUM SERPL-SCNC: 136 MMOL/L (ref 135–144)
WBC OTHER # BLD: 4.2 K/UL (ref 3.5–11)

## 2023-06-23 PROCEDURE — 6370000000 HC RX 637 (ALT 250 FOR IP): Performed by: INTERNAL MEDICINE

## 2023-06-23 PROCEDURE — G8417 CALC BMI ABV UP PARAM F/U: HCPCS | Performed by: INTERNAL MEDICINE

## 2023-06-23 PROCEDURE — G8427 DOCREV CUR MEDS BY ELIG CLIN: HCPCS | Performed by: INTERNAL MEDICINE

## 2023-06-23 PROCEDURE — 96372 THER/PROPH/DIAG INJ SC/IM: CPT

## 2023-06-23 PROCEDURE — 36415 COLL VENOUS BLD VENIPUNCTURE: CPT

## 2023-06-23 PROCEDURE — 1036F TOBACCO NON-USER: CPT | Performed by: INTERNAL MEDICINE

## 2023-06-23 PROCEDURE — 3017F COLORECTAL CA SCREEN DOC REV: CPT | Performed by: INTERNAL MEDICINE

## 2023-06-23 PROCEDURE — 85027 COMPLETE CBC AUTOMATED: CPT

## 2023-06-23 PROCEDURE — 99214 OFFICE O/P EST MOD 30 MIN: CPT | Performed by: INTERNAL MEDICINE

## 2023-06-23 PROCEDURE — 80053 COMPREHEN METABOLIC PANEL: CPT

## 2023-06-23 PROCEDURE — 6360000002 HC RX W HCPCS: Performed by: INTERNAL MEDICINE

## 2023-06-23 PROCEDURE — 99211 OFF/OP EST MAY X REQ PHY/QHP: CPT | Performed by: INTERNAL MEDICINE

## 2023-06-23 RX ORDER — SODIUM CHLORIDE 9 MG/ML
INJECTION, SOLUTION INTRAVENOUS CONTINUOUS
OUTPATIENT
Start: 2023-07-21

## 2023-06-23 RX ORDER — LANREOTIDE ACETATE 120 MG/.5ML
120 INJECTION SUBCUTANEOUS ONCE
Status: COMPLETED | OUTPATIENT
Start: 2023-06-23 | End: 2023-06-23

## 2023-06-23 RX ORDER — ALBUTEROL SULFATE 90 UG/1
4 AEROSOL, METERED RESPIRATORY (INHALATION) PRN
OUTPATIENT
Start: 2023-07-21

## 2023-06-23 RX ORDER — LANREOTIDE ACETATE 120 MG/.5ML
120 INJECTION SUBCUTANEOUS ONCE
OUTPATIENT
Start: 2023-07-21 | End: 2023-07-21

## 2023-06-23 RX ORDER — FAMOTIDINE 10 MG/ML
20 INJECTION, SOLUTION INTRAVENOUS
OUTPATIENT
Start: 2023-07-21

## 2023-06-23 RX ORDER — ONDANSETRON 2 MG/ML
8 INJECTION INTRAMUSCULAR; INTRAVENOUS
OUTPATIENT
Start: 2023-07-21

## 2023-06-23 RX ORDER — ACETAMINOPHEN 325 MG/1
650 TABLET ORAL
OUTPATIENT
Start: 2023-07-21

## 2023-06-23 RX ORDER — DIPHENHYDRAMINE HYDROCHLORIDE 50 MG/ML
50 INJECTION INTRAMUSCULAR; INTRAVENOUS
OUTPATIENT
Start: 2023-07-21

## 2023-06-23 RX ORDER — INSULIN GLARGINE 100 [IU]/ML
INJECTION, SOLUTION SUBCUTANEOUS
COMMUNITY
Start: 2023-06-16

## 2023-06-23 RX ORDER — EPINEPHRINE 1 MG/ML
0.3 INJECTION, SOLUTION, CONCENTRATE INTRAVENOUS PRN
OUTPATIENT
Start: 2023-07-21

## 2023-06-23 RX ADMIN — INSULIN HUMAN 8 UNITS: 100 INJECTION, SOLUTION PARENTERAL at 13:33

## 2023-06-23 RX ADMIN — LANREOTIDE ACETATE 120 MG: 120 INJECTION SUBCUTANEOUS at 13:33

## 2023-06-23 NOTE — PROGRESS NOTES
Pt here for lanreotide injection   Arrives ambulatory   Pt seen by Dr. Washington Bueno, orders received to proceed with tx and to give 8 units insulin subQ.    Tx complete without incident   Pt discharged in stable condition   Returns 7/21 for next lanreotide injection

## 2023-06-23 NOTE — PATIENT INSTRUCTIONS
Insulin regular 8units not ( pt last insulin was in am )   Rv in 8 weeks with tx   Continue tx as planned

## 2023-06-23 NOTE — TELEPHONE ENCOUNTER
AVS from 6/23/23      Insulin regular 8units not ( pt last insulin was in am )   Rv in 8 weeks with tx   Continue tx as planned       Rv sched for 8/18 @ 11:00 with injection to follow    Pt was given AVS and appointment schedule    Electronically signed by Romayne Poke on 6/23/2023 at 1:29 PM

## 2023-07-21 ENCOUNTER — HOSPITAL ENCOUNTER (OUTPATIENT)
Dept: INFUSION THERAPY | Age: 53
Discharge: HOME | End: 2023-07-21
Payer: COMMERCIAL

## 2023-07-21 DIAGNOSIS — D3A.8 PRIMARY PANCREATIC NEUROENDOCRINE TUMOR: Primary | ICD-10-CM

## 2023-07-21 PROCEDURE — 96372 THER/PROPH/DIAG INJ SC/IM: CPT

## 2023-07-21 PROCEDURE — 6360000002 HC RX W HCPCS: Performed by: INTERNAL MEDICINE

## 2023-07-21 RX ORDER — ONDANSETRON 2 MG/ML
8 INJECTION INTRAMUSCULAR; INTRAVENOUS
OUTPATIENT
Start: 2023-08-18

## 2023-07-21 RX ORDER — SODIUM CHLORIDE 9 MG/ML
INJECTION, SOLUTION INTRAVENOUS CONTINUOUS
OUTPATIENT
Start: 2023-08-18

## 2023-07-21 RX ORDER — LANREOTIDE ACETATE 120 MG/.5ML
120 INJECTION SUBCUTANEOUS ONCE
OUTPATIENT
Start: 2023-08-18 | End: 2023-08-18

## 2023-07-21 RX ORDER — DIPHENHYDRAMINE HYDROCHLORIDE 50 MG/ML
50 INJECTION INTRAMUSCULAR; INTRAVENOUS
OUTPATIENT
Start: 2023-08-18

## 2023-07-21 RX ORDER — LANREOTIDE ACETATE 120 MG/.5ML
120 INJECTION SUBCUTANEOUS ONCE
Status: COMPLETED | OUTPATIENT
Start: 2023-07-21 | End: 2023-07-21

## 2023-07-21 RX ORDER — FAMOTIDINE 10 MG/ML
20 INJECTION, SOLUTION INTRAVENOUS
OUTPATIENT
Start: 2023-08-18

## 2023-07-21 RX ORDER — EPINEPHRINE 1 MG/ML
0.3 INJECTION, SOLUTION, CONCENTRATE INTRAVENOUS PRN
OUTPATIENT
Start: 2023-08-18

## 2023-07-21 RX ORDER — ALBUTEROL SULFATE 90 UG/1
4 AEROSOL, METERED RESPIRATORY (INHALATION) PRN
OUTPATIENT
Start: 2023-08-18

## 2023-07-21 RX ORDER — ACETAMINOPHEN 325 MG/1
650 TABLET ORAL
OUTPATIENT
Start: 2023-08-18

## 2023-07-21 RX ADMIN — LANREOTIDE ACETATE 120 MG: 120 INJECTION SUBCUTANEOUS at 10:55

## 2023-07-21 NOTE — PROGRESS NOTES
Pt here for lanreotide injection   Arrives ambulatory   Tx complete without incident   Pt discharged in stable condition   Returns 8/18 for next lanreotide injection and MD visit

## 2023-07-26 ENCOUNTER — TELEPHONE (OUTPATIENT)
Dept: ONCOLOGY | Age: 53
End: 2023-07-26

## 2023-07-26 NOTE — TELEPHONE ENCOUNTER
Patient called stating electricity had been shut off.  went over [employer] through [employer], patient states she already has a scheduled appointment with them tomorrow.  went over getting a Medical Certificate from electrical provider, patient states they have already utilized certificate three times this year.  encouraged patient to keep [employer] tomorrow and to ask about discounted bills/programs too.

## 2023-07-26 NOTE — TELEPHONE ENCOUNTER
Name: Alberto Arellano  : 1970  MRN: [id number]    Oncology Navigation Follow-Up Note    Contact Type:  Telephone    Notes: Spoke w/pt for f/u call. Pt stated electric recently turned off for non payment. Encouraged pt to contact Gloria Jennings, [facility] , & contact # given. Pt verbalized understanding & denied questions/concerns. Encouraged to contact writer prn. Will continue to follow.     Electronically signed by Maria A Hammer RN on 2023 at 1:53 PM

## 2023-07-27 RX ORDER — PANTOPRAZOLE SODIUM 40 MG/1
TABLET, DELAYED RELEASE ORAL
Qty: 60 TABLET | Refills: 5 | Status: SHIPPED | OUTPATIENT
Start: 2023-07-27

## 2023-07-28 ENCOUNTER — TELEPHONE (OUTPATIENT)
Dept: ONCOLOGY | Age: 53
End: 2023-07-28

## 2023-07-28 NOTE — TELEPHONE ENCOUNTER
Patient called asking for \"letter from doctor that says they can never turn off my electricity ever. \"  states he is unfamiliar with being able to send letter outside of requesting a Medical Certificate. Patient states she has been told it is possible.  encouraged patient to call utility provider and that any paperwork they send can be filled out by staff for her.

## 2023-08-25 ENCOUNTER — HOSPITAL ENCOUNTER (OUTPATIENT)
Dept: INFUSION THERAPY | Age: 53
Discharge: HOME | End: 2023-08-25
Payer: COMMERCIAL

## 2023-08-25 ENCOUNTER — TELEPHONE (OUTPATIENT)
Dept: ONCOLOGY | Age: 53
End: 2023-08-25

## 2023-08-25 ENCOUNTER — OFFICE VISIT (OUTPATIENT)
Dept: ONCOLOGY | Age: 53
End: 2023-08-25
Payer: COMMERCIAL

## 2023-08-25 VITALS
TEMPERATURE: 97.4 F | DIASTOLIC BLOOD PRESSURE: 64 MMHG | WEIGHT: 194 LBS | SYSTOLIC BLOOD PRESSURE: 125 MMHG | HEART RATE: 94 BPM | BODY MASS INDEX: 38.53 KG/M2

## 2023-08-25 DIAGNOSIS — R73.9 HYPERGLYCEMIA: ICD-10-CM

## 2023-08-25 DIAGNOSIS — D3A.8 PRIMARY PANCREATIC NEUROENDOCRINE TUMOR: Primary | ICD-10-CM

## 2023-08-25 DIAGNOSIS — Z79.899 HIGH RISK MEDICATION USE: ICD-10-CM

## 2023-08-25 DIAGNOSIS — D3A.8 PRIMARY PANCREATIC NEUROENDOCRINE TUMOR: ICD-10-CM

## 2023-08-25 DIAGNOSIS — K86.89 PANCREATIC MASS: ICD-10-CM

## 2023-08-25 DIAGNOSIS — K86.89 PANCREATIC MASS: Primary | ICD-10-CM

## 2023-08-25 DIAGNOSIS — K31.819 GAVE (GASTRIC ANTRAL VASCULAR ECTASIA): ICD-10-CM

## 2023-08-25 LAB
ALBUMIN SERPL-MCNC: 4.4 G/DL (ref 3.5–5.2)
ALBUMIN/GLOB SERPL: 1.5 {RATIO} (ref 1–2.5)
ALP SERPL-CCNC: 140 U/L (ref 35–104)
ALT SERPL-CCNC: 38 U/L (ref 5–33)
ANION GAP SERPL CALCULATED.3IONS-SCNC: 10 MMOL/L (ref 9–17)
AST SERPL-CCNC: 35 U/L
BASOPHILS # BLD: 0.1 K/UL (ref 0–0.2)
BASOPHILS NFR BLD: 1 % (ref 0–2)
BILIRUB SERPL-MCNC: 0.5 MG/DL (ref 0.3–1.2)
BUN SERPL-MCNC: 12 MG/DL (ref 6–20)
CALCIUM SERPL-MCNC: 9.7 MG/DL (ref 8.6–10.4)
CHLORIDE SERPL-SCNC: 101 MMOL/L (ref 98–107)
CO2 SERPL-SCNC: 24 MMOL/L (ref 20–31)
CREAT SERPL-MCNC: 0.9 MG/DL (ref 0.5–0.9)
EOSINOPHIL # BLD: 0.7 K/UL (ref 0–0.4)
EOSINOPHILS RELATIVE PERCENT: 12 % (ref 1–4)
ERYTHROCYTE [DISTWIDTH] IN BLOOD BY AUTOMATED COUNT: 13 % (ref 12.5–15.4)
GFR SERPL CREATININE-BSD FRML MDRD: >60 ML/MIN/1.73M2
GLUCOSE SERPL-MCNC: 300 MG/DL (ref 70–99)
HCT VFR BLD AUTO: 38.8 % (ref 36–46)
HGB BLD-MCNC: 13 G/DL (ref 12–16)
LYMPHOCYTES NFR BLD: 1.3 K/UL (ref 1–4.8)
LYMPHOCYTES RELATIVE PERCENT: 22 % (ref 24–44)
MCH RBC QN AUTO: 30.8 PG (ref 26–34)
MCHC RBC AUTO-ENTMCNC: 33.6 G/DL (ref 31–37)
MCV RBC AUTO: 91.7 FL (ref 80–100)
MONOCYTES NFR BLD: 0.3 K/UL (ref 0.1–1.2)
MONOCYTES NFR BLD: 5 % (ref 2–11)
NEUTROPHILS NFR BLD: 60 % (ref 36–66)
NEUTS SEG NFR BLD: 3.5 K/UL (ref 1.8–7.7)
PLATELET # BLD AUTO: 215 K/UL (ref 140–450)
PMV BLD AUTO: 7.6 FL (ref 6–12)
POTASSIUM SERPL-SCNC: 4.2 MMOL/L (ref 3.7–5.3)
PROT SERPL-MCNC: 7.4 G/DL (ref 6.4–8.3)
RBC # BLD AUTO: 4.23 M/UL (ref 4–5.2)
SODIUM SERPL-SCNC: 135 MMOL/L (ref 135–144)
WBC OTHER # BLD: 5.9 K/UL (ref 3.5–11)

## 2023-08-25 PROCEDURE — 6360000002 HC RX W HCPCS: Performed by: INTERNAL MEDICINE

## 2023-08-25 PROCEDURE — 99211 OFF/OP EST MAY X REQ PHY/QHP: CPT | Performed by: INTERNAL MEDICINE

## 2023-08-25 PROCEDURE — 1036F TOBACCO NON-USER: CPT | Performed by: INTERNAL MEDICINE

## 2023-08-25 PROCEDURE — 99214 OFFICE O/P EST MOD 30 MIN: CPT | Performed by: INTERNAL MEDICINE

## 2023-08-25 PROCEDURE — 6370000000 HC RX 637 (ALT 250 FOR IP): Performed by: INTERNAL MEDICINE

## 2023-08-25 PROCEDURE — 85025 COMPLETE CBC W/AUTO DIFF WBC: CPT

## 2023-08-25 PROCEDURE — 96372 THER/PROPH/DIAG INJ SC/IM: CPT

## 2023-08-25 PROCEDURE — G8427 DOCREV CUR MEDS BY ELIG CLIN: HCPCS | Performed by: INTERNAL MEDICINE

## 2023-08-25 PROCEDURE — 3017F COLORECTAL CA SCREEN DOC REV: CPT | Performed by: INTERNAL MEDICINE

## 2023-08-25 PROCEDURE — 80053 COMPREHEN METABOLIC PANEL: CPT

## 2023-08-25 PROCEDURE — 36415 COLL VENOUS BLD VENIPUNCTURE: CPT

## 2023-08-25 PROCEDURE — G8417 CALC BMI ABV UP PARAM F/U: HCPCS | Performed by: INTERNAL MEDICINE

## 2023-08-25 RX ORDER — ONDANSETRON 2 MG/ML
8 INJECTION INTRAMUSCULAR; INTRAVENOUS
OUTPATIENT
Start: 2023-09-15

## 2023-08-25 RX ORDER — LANREOTIDE ACETATE 120 MG/.5ML
120 INJECTION SUBCUTANEOUS ONCE
OUTPATIENT
Start: 2023-09-15 | End: 2023-09-15

## 2023-08-25 RX ORDER — SODIUM CHLORIDE 9 MG/ML
INJECTION, SOLUTION INTRAVENOUS CONTINUOUS
OUTPATIENT
Start: 2023-09-15

## 2023-08-25 RX ORDER — ACETAMINOPHEN 325 MG/1
650 TABLET ORAL
OUTPATIENT
Start: 2023-09-15

## 2023-08-25 RX ORDER — LANREOTIDE ACETATE 120 MG/.5ML
120 INJECTION SUBCUTANEOUS ONCE
Status: COMPLETED | OUTPATIENT
Start: 2023-08-25 | End: 2023-08-25

## 2023-08-25 RX ORDER — DIPHENHYDRAMINE HYDROCHLORIDE 50 MG/ML
50 INJECTION INTRAMUSCULAR; INTRAVENOUS
OUTPATIENT
Start: 2023-09-15

## 2023-08-25 RX ORDER — ALBUTEROL SULFATE 90 UG/1
4 AEROSOL, METERED RESPIRATORY (INHALATION) PRN
OUTPATIENT
Start: 2023-09-15

## 2023-08-25 RX ORDER — FAMOTIDINE 10 MG/ML
20 INJECTION, SOLUTION INTRAVENOUS
OUTPATIENT
Start: 2023-09-15

## 2023-08-25 RX ORDER — EPINEPHRINE 1 MG/ML
0.3 INJECTION, SOLUTION, CONCENTRATE INTRAVENOUS PRN
OUTPATIENT
Start: 2023-09-15

## 2023-08-25 RX ADMIN — LANREOTIDE ACETATE 120 MG: 120 INJECTION SUBCUTANEOUS at 11:50

## 2023-08-25 RX ADMIN — INSULIN HUMAN 6 UNITS: 100 INJECTION, SOLUTION PARENTERAL at 12:10

## 2023-08-25 NOTE — PROGRESS NOTES
Lesli Anders                                                                                                                  8/25/2023  MRN:   [id number]  YOB: 1970  PCP:                           Yannick Felder MD  Referring Physician: No ref. provider found  Treating Physician Name: Dipak Haddad MD      Reason for visit:  Chief Complaint   Patient presents with    Follow-up     Review status of disease    Other     Chest pain patient thinks its stress    Toxicity check. Discussed treatment plan. Current problems:  Well-differentiated, grade 1, pancreatic neuroendocrine tumor, clinical stage T2 N0 M0  Elevated gastrin (patient on Protonix)  SMA focal severe stenosis, chronic per CT scan  Multiple comorbidities including diabetes mellitus obesity, renal insufficiency and severe hepatic steatosis  Family history of leukemia     Active and recent treatments:  Patient medically unfit for surgery  Lanreotide-11/2022    Interim History:  Patient presents to the clinic for a follow-up visit and to discuss results of her lab work-up rather than clinical data. Patient overall tolerating treatment well. States she is under a lot of emotional stress contributing to her glucose to be high. Patient glucose 300 on the BMP. Denies fever chills but denies hospitalization or ER visit. During this visit patient's allergy, social, medical, surgical history and medications were reviewed and updated. Summary of Case/History:    Lesli Anders a 46 y. o.female is a patient who presents to the clinic to establish care and for further work-up and evaluation. Patient has multiple comorbidities including diabetes COPD hypertension morbid obesity. Patient initially presented to the emergency department with complaints of right lower quadrant pain radiating to the back. Work-up revealed acute kidney injury pyelonephritis.   Patient CT scan without contrast also showed a possible solid-appearing lesion

## 2023-08-25 NOTE — TELEPHONE ENCOUNTER
Name: Harley Mascorro  : 1970  MRN: [id number]    Oncology Navigation Follow-Up Note    Contact Type:  Medical Oncology    Notes: Pt @ [facility] for Dr. Ag Saleh f/u & tx. Met w/pt prior to f/u. Pt denied questions/concerns. Encouraged to contact writer prn. Will continue to follow.     Electronically signed by Cruzito Last RN on 2023 at 11:28 AM

## 2023-08-25 NOTE — PROGRESS NOTES
Patient here for lanreotide injection and 6 units regular insulin. She saw Dr. Nichol Bradshaw today see his dictation. She tolerated injection well and was discharged home in stable condition.   She is to return 9/15 for lanreotide

## 2023-08-25 NOTE — TELEPHONE ENCOUNTER
AVS from 8/25/23    Insulin regular 6 units   Add gastrin level to blood draw   Tx today   Rv in 4 weeks with tx      *rv is sched Ryley@[facility] w tx to follow      PT was given AVS and appt schedule

## 2023-09-14 ENCOUNTER — TELEPHONE (OUTPATIENT)
Dept: SURGERY | Age: 53
End: 2023-09-14

## 2023-09-14 NOTE — TELEPHONE ENCOUNTER
Phone call to pt to cancel 11/9 appt. Informed pt that Dr. Arabella Booth is leaving and she is to continue to follow Dr. Yury Reid. Informed pt that a letter will be going out from [facility] regarding this. Pt voiced understanding.

## 2023-09-22 ENCOUNTER — HOSPITAL ENCOUNTER (OUTPATIENT)
Dept: INFUSION THERAPY | Age: 53
Discharge: HOME | End: 2023-09-22
Payer: COMMERCIAL

## 2023-09-22 ENCOUNTER — TELEPHONE (OUTPATIENT)
Dept: ONCOLOGY | Age: 53
End: 2023-09-22

## 2023-09-22 ENCOUNTER — OFFICE VISIT (OUTPATIENT)
Dept: ONCOLOGY | Age: 53
End: 2023-09-22
Payer: COMMERCIAL

## 2023-09-22 VITALS
TEMPERATURE: 96.9 F | OXYGEN SATURATION: 99 % | BODY MASS INDEX: 38.57 KG/M2 | HEART RATE: 86 BPM | DIASTOLIC BLOOD PRESSURE: 74 MMHG | RESPIRATION RATE: 18 BRPM | WEIGHT: 194.2 LBS | SYSTOLIC BLOOD PRESSURE: 106 MMHG

## 2023-09-22 DIAGNOSIS — D3A.8 PRIMARY PANCREATIC NEUROENDOCRINE TUMOR: Primary | ICD-10-CM

## 2023-09-22 DIAGNOSIS — R73.09 OTHER ABNORMAL GLUCOSE: ICD-10-CM

## 2023-09-22 DIAGNOSIS — K76.0 HEPATIC STEATOSIS: ICD-10-CM

## 2023-09-22 DIAGNOSIS — Z79.899 HIGH RISK MEDICATION USE: ICD-10-CM

## 2023-09-22 DIAGNOSIS — K31.819 GAVE (GASTRIC ANTRAL VASCULAR ECTASIA): ICD-10-CM

## 2023-09-22 DIAGNOSIS — R73.9 HYPERGLYCEMIA: ICD-10-CM

## 2023-09-22 LAB
ALBUMIN SERPL-MCNC: 4.4 G/DL (ref 3.5–5.2)
ALBUMIN/GLOB SERPL: 1.4 {RATIO} (ref 1–2.5)
ALP SERPL-CCNC: 172 U/L (ref 35–104)
ALT SERPL-CCNC: 61 U/L (ref 5–33)
ANION GAP SERPL CALCULATED.3IONS-SCNC: 11 MMOL/L (ref 9–17)
AST SERPL-CCNC: 67 U/L
BASOPHILS # BLD: 0.1 K/UL (ref 0–0.2)
BASOPHILS NFR BLD: 1 % (ref 0–2)
BILIRUB SERPL-MCNC: 0.8 MG/DL (ref 0.3–1.2)
BUN SERPL-MCNC: 11 MG/DL (ref 6–20)
CALCIUM SERPL-MCNC: 9.3 MG/DL (ref 8.6–10.4)
CHLORIDE SERPL-SCNC: 100 MMOL/L (ref 98–107)
CO2 SERPL-SCNC: 23 MMOL/L (ref 20–31)
CREAT SERPL-MCNC: 0.9 MG/DL (ref 0.5–0.9)
EOSINOPHIL # BLD: 0.6 K/UL (ref 0–0.4)
EOSINOPHILS RELATIVE PERCENT: 13 % (ref 1–4)
ERYTHROCYTE [DISTWIDTH] IN BLOOD BY AUTOMATED COUNT: 13.2 % (ref 12.5–15.4)
EST. AVERAGE GLUCOSE BLD GHB EST-MCNC: 258 MG/DL
GFR SERPL CREATININE-BSD FRML MDRD: >60 ML/MIN/1.73M2
GLUCOSE SERPL-MCNC: 376 MG/DL (ref 70–99)
HBA1C MFR BLD: 10.6 % (ref 4–6)
HCT VFR BLD AUTO: 38.7 % (ref 36–46)
HGB BLD-MCNC: 13.1 G/DL (ref 12–16)
LYMPHOCYTES NFR BLD: 1.3 K/UL (ref 1–4.8)
LYMPHOCYTES RELATIVE PERCENT: 29 % (ref 24–44)
MCH RBC QN AUTO: 31.2 PG (ref 26–34)
MCHC RBC AUTO-ENTMCNC: 33.9 G/DL (ref 31–37)
MCV RBC AUTO: 91.9 FL (ref 80–100)
MONOCYTES NFR BLD: 0.2 K/UL (ref 0.1–1.2)
MONOCYTES NFR BLD: 5 % (ref 2–11)
NEUTROPHILS NFR BLD: 52 % (ref 36–66)
NEUTS SEG NFR BLD: 2.4 K/UL (ref 1.8–7.7)
PLATELET # BLD AUTO: 217 K/UL (ref 140–450)
PMV BLD AUTO: 7.6 FL (ref 6–12)
POTASSIUM SERPL-SCNC: 4.2 MMOL/L (ref 3.7–5.3)
PROT SERPL-MCNC: 7.6 G/DL (ref 6.4–8.3)
RBC # BLD AUTO: 4.22 M/UL (ref 4–5.2)
SODIUM SERPL-SCNC: 134 MMOL/L (ref 135–144)
WBC OTHER # BLD: 4.6 K/UL (ref 3.5–11)

## 2023-09-22 PROCEDURE — 99214 OFFICE O/P EST MOD 30 MIN: CPT | Performed by: INTERNAL MEDICINE

## 2023-09-22 PROCEDURE — 83036 HEMOGLOBIN GLYCOSYLATED A1C: CPT

## 2023-09-22 PROCEDURE — 1036F TOBACCO NON-USER: CPT | Performed by: INTERNAL MEDICINE

## 2023-09-22 PROCEDURE — 96372 THER/PROPH/DIAG INJ SC/IM: CPT

## 2023-09-22 PROCEDURE — 99211 OFF/OP EST MAY X REQ PHY/QHP: CPT | Performed by: INTERNAL MEDICINE

## 2023-09-22 PROCEDURE — 36415 COLL VENOUS BLD VENIPUNCTURE: CPT

## 2023-09-22 PROCEDURE — G8417 CALC BMI ABV UP PARAM F/U: HCPCS | Performed by: INTERNAL MEDICINE

## 2023-09-22 PROCEDURE — 6370000000 HC RX 637 (ALT 250 FOR IP): Performed by: INTERNAL MEDICINE

## 2023-09-22 PROCEDURE — 80053 COMPREHEN METABOLIC PANEL: CPT

## 2023-09-22 PROCEDURE — 6360000002 HC RX W HCPCS: Performed by: INTERNAL MEDICINE

## 2023-09-22 PROCEDURE — 85025 COMPLETE CBC W/AUTO DIFF WBC: CPT

## 2023-09-22 PROCEDURE — G8427 DOCREV CUR MEDS BY ELIG CLIN: HCPCS | Performed by: INTERNAL MEDICINE

## 2023-09-22 PROCEDURE — 3017F COLORECTAL CA SCREEN DOC REV: CPT | Performed by: INTERNAL MEDICINE

## 2023-09-22 RX ORDER — LANREOTIDE ACETATE 120 MG/.5ML
120 INJECTION SUBCUTANEOUS ONCE
OUTPATIENT
Start: 2023-10-20 | End: 2023-10-20

## 2023-09-22 RX ORDER — LANREOTIDE ACETATE 120 MG/.5ML
120 INJECTION SUBCUTANEOUS ONCE
Status: COMPLETED | OUTPATIENT
Start: 2023-09-22 | End: 2023-09-22

## 2023-09-22 RX ORDER — ONDANSETRON 2 MG/ML
8 INJECTION INTRAMUSCULAR; INTRAVENOUS
OUTPATIENT
Start: 2023-10-20

## 2023-09-22 RX ORDER — ALBUTEROL SULFATE 90 UG/1
4 AEROSOL, METERED RESPIRATORY (INHALATION) PRN
OUTPATIENT
Start: 2023-10-20

## 2023-09-22 RX ORDER — ACETAMINOPHEN 325 MG/1
650 TABLET ORAL
OUTPATIENT
Start: 2023-10-20

## 2023-09-22 RX ORDER — DIPHENHYDRAMINE HYDROCHLORIDE 50 MG/ML
50 INJECTION INTRAMUSCULAR; INTRAVENOUS
OUTPATIENT
Start: 2023-10-20

## 2023-09-22 RX ORDER — EPINEPHRINE 1 MG/ML
0.3 INJECTION, SOLUTION, CONCENTRATE INTRAVENOUS PRN
OUTPATIENT
Start: 2023-10-20

## 2023-09-22 RX ORDER — FAMOTIDINE 10 MG/ML
20 INJECTION, SOLUTION INTRAVENOUS
OUTPATIENT
Start: 2023-10-20

## 2023-09-22 RX ORDER — SODIUM CHLORIDE 9 MG/ML
INJECTION, SOLUTION INTRAVENOUS CONTINUOUS
OUTPATIENT
Start: 2023-10-20

## 2023-09-22 RX ADMIN — LANREOTIDE ACETATE 120 MG: 120 INJECTION SUBCUTANEOUS at 11:59

## 2023-09-22 RX ADMIN — INSULIN HUMAN 6 UNITS: 100 INJECTION, SOLUTION PARENTERAL at 11:59

## 2023-09-22 NOTE — PROGRESS NOTES
Pt here lanreotide injection. Arrives ambulatory. Denies any new complaints. Labs drawn, results reviewed. Pt was seen by Dr. Romayne Leander, order rec'd to proceed with tx and to give 6 units of regular insulin. Tx complete without incident. Pt d/c'd in stable condition. Returns 10/20/2023 for office visit and lanreotide injection.

## 2023-09-22 NOTE — PROGRESS NOTES
Demetrio Smith                                                                                                                  9/22/2023  MRN:   [id number]  YOB: 1970  PCP:                           Scottie Lopez MD  Referring Physician: No ref. provider found  Treating Physician Name: Shannen Carver MD      Reason for visit:  Chief Complaint   Patient presents with    Follow-up     Injection to follow   Toxicity check. Discussed treatment plan. Current problems:  Well-differentiated, grade 1, pancreatic neuroendocrine tumor, clinical stage T2 N0 M0  Elevated gastrin (patient on Protonix)  SMA focal severe stenosis, chronic per CT scan  Multiple comorbidities including diabetes mellitus obesity, renal insufficiency and severe hepatic steatosis  Family history of leukemia     Active and recent treatments:  Patient medically unfit for surgery  Lanreotide-11/2022    Interim History:  Patient presents to the clinic for a follow-up visit and for toxicity check and to review results of her blood work-up. Patient has been tolerating treatment without any unexpected or severe side effects. Denies hospitalization or ER visit. Appetite is good. Weight is stable. Nausea is controlled. Glucose remains high    During this visit patient's allergy, social, medical, surgical history and medications were reviewed and updated. Summary of Case/History:    Demetrio Smith a 46 y. o.female is a patient who presents to the clinic to establish care and for further work-up and evaluation. Patient has multiple comorbidities including diabetes COPD hypertension morbid obesity. Patient initially presented to the emergency department with complaints of right lower quadrant pain radiating to the back. Work-up revealed acute kidney injury pyelonephritis.   Patient CT scan without contrast also showed a possible solid-appearing lesion in the pancreatic uncinate process there was no biliary or pancreatic ductal

## 2023-09-22 NOTE — PATIENT INSTRUCTIONS
Insuln regular 6 units S/c x 1   Tx today   Rv in 4 weeks wih ct pet prior  Add hba1c to todays blood draw   Labs with rv

## 2023-09-22 NOTE — TELEPHONE ENCOUNTER
AVS from 9/22/23      Insuln regular 6 units S/c x 1   Tx today   Rv in 4 weeks wih ct pet prior  Add hba1c to todays blood draw   Labs with rv       Pet scehd for 10/11 @ 8:30     Rv sched for 10/20 @ 10:30 with tx to follow    Pt was given AVS and appointment schedule    Electronically signed by Margie Parekh on 9/22/2023 at 11:30 AM

## 2023-09-27 ENCOUNTER — TELEPHONE (OUTPATIENT)
Dept: ONCOLOGY | Age: 53
End: 2023-09-27

## 2023-09-27 NOTE — TELEPHONE ENCOUNTER
Name: Jitendra Knight  : 1970  MRN: [id number]    Oncology Navigation Follow-Up Note    Contact Type:  Telephone    Notes: Dr. Carmita Merino updated on  [facility] results 10.6. Dr. Carmita Merino requested pt f/u w/PCP. Attempted to contact pt, no answer, VM left updated on results & Dr. Carly Nuno instructions. Will continue to follow.     Electronically signed by Loretta Mix RN on 2023 at 1:18 PM

## 2023-10-11 ENCOUNTER — HOSPITAL ENCOUNTER (OUTPATIENT)
Dept: NUCLEAR MEDICINE | Age: 53
Discharge: HOME | End: 2023-10-13
Attending: INTERNAL MEDICINE
Payer: COMMERCIAL

## 2023-10-11 DIAGNOSIS — D3A.8 PRIMARY PANCREATIC NEUROENDOCRINE TUMOR: ICD-10-CM

## 2023-10-11 PROCEDURE — 78815 PET IMAGE W/CT SKULL-THIGH: CPT

## 2023-10-11 PROCEDURE — 2580000003 HC RX 258: Performed by: INTERNAL MEDICINE

## 2023-10-11 PROCEDURE — 3430000000 HC RX DIAGNOSTIC RADIOPHARMACEUTICAL: Performed by: INTERNAL MEDICINE

## 2023-10-11 PROCEDURE — A9592 HC RX DIAGNOSTIC RADIOPHARMACEUTICAL: HCPCS | Performed by: INTERNAL MEDICINE

## 2023-10-11 RX ORDER — SODIUM CHLORIDE 0.9 % (FLUSH) 0.9 %
10 SYRINGE (ML) INJECTION PRN
Status: DISCONTINUED | OUTPATIENT
Start: 2023-10-11 | End: 2023-10-14 | Stop reason: HOSPADM

## 2023-10-11 RX ADMIN — SODIUM CHLORIDE, PRESERVATIVE FREE 10 ML: 5 INJECTION INTRAVENOUS at 07:42

## 2023-10-11 RX ADMIN — COPPER CU 64 DOTATATE 4.35 MILLICURIE: 1 INJECTION, SOLUTION INTRAVENOUS at 07:42

## 2023-11-03 ENCOUNTER — TELEPHONE (OUTPATIENT)
Dept: INFUSION THERAPY | Age: 53
End: 2023-11-03

## 2023-11-03 ENCOUNTER — HOSPITAL ENCOUNTER (OUTPATIENT)
Dept: INFUSION THERAPY | Age: 53
Discharge: HOME | End: 2023-11-03
Payer: COMMERCIAL

## 2023-11-03 ENCOUNTER — OFFICE VISIT (OUTPATIENT)
Dept: ONCOLOGY | Age: 53
End: 2023-11-03
Payer: COMMERCIAL

## 2023-11-03 ENCOUNTER — TELEPHONE (OUTPATIENT)
Dept: ONCOLOGY | Age: 53
End: 2023-11-03

## 2023-11-03 VITALS
SYSTOLIC BLOOD PRESSURE: 133 MMHG | DIASTOLIC BLOOD PRESSURE: 83 MMHG | TEMPERATURE: 97.4 F | WEIGHT: 191.1 LBS | RESPIRATION RATE: 18 BRPM | BODY MASS INDEX: 37.95 KG/M2 | HEART RATE: 82 BPM

## 2023-11-03 DIAGNOSIS — Z79.899 HIGH RISK MEDICATION USE: ICD-10-CM

## 2023-11-03 DIAGNOSIS — K31.819 GAVE (GASTRIC ANTRAL VASCULAR ECTASIA): ICD-10-CM

## 2023-11-03 DIAGNOSIS — D3A.8 PRIMARY PANCREATIC NEUROENDOCRINE TUMOR: Primary | ICD-10-CM

## 2023-11-03 DIAGNOSIS — K76.0 HEPATIC STEATOSIS: ICD-10-CM

## 2023-11-03 DIAGNOSIS — R73.9 HYPERGLYCEMIA: ICD-10-CM

## 2023-11-03 DIAGNOSIS — R73.09 OTHER ABNORMAL GLUCOSE: ICD-10-CM

## 2023-11-03 LAB
ALBUMIN SERPL-MCNC: 4.3 G/DL (ref 3.5–5.2)
ALBUMIN/GLOB SERPL: 1.4 {RATIO} (ref 1–2.5)
ALP SERPL-CCNC: 144 U/L (ref 35–104)
ALT SERPL-CCNC: 29 U/L (ref 5–33)
ANION GAP SERPL CALCULATED.3IONS-SCNC: 11 MMOL/L (ref 9–17)
AST SERPL-CCNC: 27 U/L
BASOPHILS # BLD: 0.05 K/UL (ref 0–0.2)
BASOPHILS NFR BLD: 1 % (ref 0–2)
BILIRUB SERPL-MCNC: 0.6 MG/DL (ref 0.3–1.2)
BUN SERPL-MCNC: 13 MG/DL (ref 6–20)
CALCIUM SERPL-MCNC: 9.2 MG/DL (ref 8.6–10.4)
CHLORIDE SERPL-SCNC: 104 MMOL/L (ref 98–107)
CO2 SERPL-SCNC: 24 MMOL/L (ref 20–31)
CREAT SERPL-MCNC: 0.9 MG/DL (ref 0.5–0.9)
EOSINOPHIL # BLD: 1.4 K/UL (ref 0–0.4)
EOSINOPHILS RELATIVE PERCENT: 27 % (ref 1–4)
ERYTHROCYTE [DISTWIDTH] IN BLOOD BY AUTOMATED COUNT: 13.5 % (ref 12.5–15.4)
GFR SERPL CREATININE-BSD FRML MDRD: >60 ML/MIN/1.73M2
GLUCOSE SERPL-MCNC: 348 MG/DL (ref 70–99)
HCT VFR BLD AUTO: 36.4 % (ref 36–46)
HGB BLD-MCNC: 12.3 G/DL (ref 12–16)
LYMPHOCYTES NFR BLD: 0.99 K/UL (ref 1–4.8)
LYMPHOCYTES RELATIVE PERCENT: 19 % (ref 24–44)
MCH RBC QN AUTO: 31.4 PG (ref 26–34)
MCHC RBC AUTO-ENTMCNC: 33.7 G/DL (ref 31–37)
MCV RBC AUTO: 93.1 FL (ref 80–100)
MONOCYTES NFR BLD: 0.21 K/UL (ref 0.1–0.8)
MONOCYTES NFR BLD: 4 % (ref 1–7)
MORPHOLOGY: NORMAL
NEUTROPHILS NFR BLD: 49 % (ref 36–66)
NEUTS SEG NFR BLD: 2.55 K/UL (ref 1.8–7.7)
PLATELET # BLD AUTO: 168 K/UL (ref 140–450)
PMV BLD AUTO: 7.5 FL (ref 6–12)
POTASSIUM SERPL-SCNC: 3.9 MMOL/L (ref 3.7–5.3)
PROT SERPL-MCNC: 7.3 G/DL (ref 6.4–8.3)
RBC # BLD AUTO: 3.91 M/UL (ref 4–5.2)
SODIUM SERPL-SCNC: 139 MMOL/L (ref 135–144)
WBC OTHER # BLD: 5.2 K/UL (ref 3.5–11)

## 2023-11-03 PROCEDURE — 99211 OFF/OP EST MAY X REQ PHY/QHP: CPT | Performed by: INTERNAL MEDICINE

## 2023-11-03 PROCEDURE — 96372 THER/PROPH/DIAG INJ SC/IM: CPT

## 2023-11-03 PROCEDURE — 1036F TOBACCO NON-USER: CPT | Performed by: INTERNAL MEDICINE

## 2023-11-03 PROCEDURE — 80053 COMPREHEN METABOLIC PANEL: CPT

## 2023-11-03 PROCEDURE — 3017F COLORECTAL CA SCREEN DOC REV: CPT | Performed by: INTERNAL MEDICINE

## 2023-11-03 PROCEDURE — G8427 DOCREV CUR MEDS BY ELIG CLIN: HCPCS | Performed by: INTERNAL MEDICINE

## 2023-11-03 PROCEDURE — 6370000000 HC RX 637 (ALT 250 FOR IP): Performed by: INTERNAL MEDICINE

## 2023-11-03 PROCEDURE — 36415 COLL VENOUS BLD VENIPUNCTURE: CPT

## 2023-11-03 PROCEDURE — G8484 FLU IMMUNIZE NO ADMIN: HCPCS | Performed by: INTERNAL MEDICINE

## 2023-11-03 PROCEDURE — 99214 OFFICE O/P EST MOD 30 MIN: CPT | Performed by: INTERNAL MEDICINE

## 2023-11-03 PROCEDURE — 6360000002 HC RX W HCPCS: Performed by: INTERNAL MEDICINE

## 2023-11-03 PROCEDURE — 85025 COMPLETE CBC W/AUTO DIFF WBC: CPT

## 2023-11-03 PROCEDURE — G8417 CALC BMI ABV UP PARAM F/U: HCPCS | Performed by: INTERNAL MEDICINE

## 2023-11-03 RX ORDER — LANREOTIDE ACETATE 120 MG/.5ML
120 INJECTION SUBCUTANEOUS ONCE
OUTPATIENT
Start: 2023-11-17 | End: 2023-11-17

## 2023-11-03 RX ORDER — ALBUTEROL SULFATE 90 UG/1
4 AEROSOL, METERED RESPIRATORY (INHALATION) PRN
OUTPATIENT
Start: 2023-11-17

## 2023-11-03 RX ORDER — ONDANSETRON 2 MG/ML
8 INJECTION INTRAMUSCULAR; INTRAVENOUS
OUTPATIENT
Start: 2023-11-17

## 2023-11-03 RX ORDER — ACETAMINOPHEN 325 MG/1
650 TABLET ORAL
OUTPATIENT
Start: 2023-11-17

## 2023-11-03 RX ORDER — FAMOTIDINE 10 MG/ML
20 INJECTION, SOLUTION INTRAVENOUS
OUTPATIENT
Start: 2023-11-17

## 2023-11-03 RX ORDER — SODIUM CHLORIDE 9 MG/ML
INJECTION, SOLUTION INTRAVENOUS CONTINUOUS
OUTPATIENT
Start: 2023-11-17

## 2023-11-03 RX ORDER — EPINEPHRINE 1 MG/ML
0.3 INJECTION, SOLUTION, CONCENTRATE INTRAVENOUS PRN
OUTPATIENT
Start: 2023-11-17

## 2023-11-03 RX ORDER — LANREOTIDE ACETATE 120 MG/.5ML
120 INJECTION SUBCUTANEOUS ONCE
Status: COMPLETED | OUTPATIENT
Start: 2023-11-03 | End: 2023-11-03

## 2023-11-03 RX ORDER — INSULIN GLARGINE 100 [IU]/ML
20 INJECTION, SOLUTION SUBCUTANEOUS DAILY
Qty: 5 ADJUSTABLE DOSE PRE-FILLED PEN SYRINGE | Refills: 0 | Status: SHIPPED | OUTPATIENT
Start: 2023-11-03

## 2023-11-03 RX ORDER — DIPHENHYDRAMINE HYDROCHLORIDE 50 MG/ML
50 INJECTION INTRAMUSCULAR; INTRAVENOUS
OUTPATIENT
Start: 2023-11-17

## 2023-11-03 RX ADMIN — INSULIN HUMAN 6 UNITS: 100 INJECTION, SOLUTION PARENTERAL at 11:18

## 2023-11-03 RX ADMIN — LANREOTIDE ACETATE 120 MG: 120 INJECTION SUBCUTANEOUS at 11:18

## 2023-11-03 NOTE — TELEPHONE ENCOUNTER
AVS from 11/3/23    Insulin regular 6 unit S/c today   Rv in 4 weeks  Refer to Dr Timmy Conway   Tx today     Tx today as scheduledr    V sched for 12/1 @ 230 with injection to follow    Pt was given AVS and appointment schedule    Electronically signed by Jose Angel Corbin on 11/3/2023 at 10:35 AM      Referral faxed to [phone]    Electronically signed by Jose Angel Corbin on 11/3/2023 at 12:55 PM

## 2023-11-03 NOTE — PROGRESS NOTES
radiotracer uptake above background in the liver. No DOTATATE avid intraperitoneal lesions. No DOTATATE avid lymph node metastasis. BONES/SOFT TISSUE: No abnormal uptake within the skeleton or soft tissues. INCIDENTAL CT FINDINGS:  Scattered atherosclerotic disease. Hepatic steatosis. Sigmoid diverticulosis. No significant change in the DOTATATE avid pancreatic head mass. No interval developing DOTATATE avid metastatic disease. Stable pancreatic head appearance compared to prior studies 5 October 2022. Impression:  Well-differentiated, grade 1, pancreatic neuroendocrine tumor, clinical stage T2 N0 M0  Elevated gastrin (patient on Protonix)  SMA stenosis, focal, chronic per CT scan  Multiple comorbidities including diabetes mellitus obesity, renal insufficiency and severe hepatic steatosis  Family history of leukemia  Gave    Plan:  I had a detailed discussion with the patient and we went over results of lab work-up imaging studies and other relevant clinical data  Reviewed results of CT PET. Overall stable disease  Continue lanreotide  We will give insulin 6 units today. Patient given refill on Lantus but increase dose to 20 units daily. Patient again strongly encouraged to set up follow-up with primary care physician  Patient previously not considered to be a good surgical candidate due to comorbidities. Patient prior hepatobiliary surgeon is no longer in practice. I will refer patient to Dr. Rock Lopez for the patient establish care  Patient seen by vascular surgery for SMA stenosis recommend continued surveillance. Stenosis is chronic in nature  NCCN guidelines were reviewed and discussed with the patient. The diagnosis and care plan were discussed with the patient in detail. I discussed the natural history of the disease, prognosis, risks and goals of therapy and answered all the patients questions to the best of my ability. Patient expressed understanding and was in agreement.     Kate Ferrara

## 2023-11-03 NOTE — TELEPHONE ENCOUNTER
Name: Shanice Bhardwaj  : 1970  MRN: [id number]    Oncology Navigation Follow-Up Note    Contact Type:  Medical Oncology    Notes: Pt @ [facility] for Dr. Eligio Christy f/u & tx. Met w/pt prior to f/u. Pt denied questions/concerns. Encouraged to contact writer prn. Will continue to follow.       Electronically signed by Anil Alba RN on 11/3/2023 at 10:14 AM

## 2023-11-03 NOTE — PROGRESS NOTES
Patient arrived for somatuline. Pt had visit with Dr Earline Breen, received orders to proceed with somatuline and give 6 units of regular insulin today. Injections given without issue. Pt stable at discharge. Scheduled to return 12/1/23 for MD visit and somatuline.

## 2023-11-06 ENCOUNTER — TELEPHONE (OUTPATIENT)
Dept: ONCOLOGY | Age: 53
End: 2023-11-06

## 2023-11-06 NOTE — TELEPHONE ENCOUNTER
Name: Zheng Springer  : 1970  MRN: [id number]    Oncology Navigation Follow-Up Note    Contact Type:  Telephone    Notes: Upon review of [facility] care everywhere noted pt scheduled for Dr. Johny Wilkerson consultation . Will continue to follow.     Electronically signed by Donaldo Deng RN on 2023 at 9:02 AM

## 2023-12-01 ENCOUNTER — OFFICE VISIT (OUTPATIENT)
Dept: ONCOLOGY | Age: 53
End: 2023-12-01
Payer: COMMERCIAL

## 2023-12-01 ENCOUNTER — HOSPITAL ENCOUNTER (OUTPATIENT)
Dept: INFUSION THERAPY | Age: 53
Discharge: HOME | End: 2023-12-01
Payer: COMMERCIAL

## 2023-12-01 VITALS
BODY MASS INDEX: 36.72 KG/M2 | DIASTOLIC BLOOD PRESSURE: 85 MMHG | WEIGHT: 184.9 LBS | HEART RATE: 74 BPM | TEMPERATURE: 96.9 F | RESPIRATION RATE: 18 BRPM | SYSTOLIC BLOOD PRESSURE: 132 MMHG | OXYGEN SATURATION: 100 %

## 2023-12-01 DIAGNOSIS — R73.9 HYPERGLYCEMIA: ICD-10-CM

## 2023-12-01 DIAGNOSIS — Z79.899 HIGH RISK MEDICATION USE: ICD-10-CM

## 2023-12-01 DIAGNOSIS — R73.09 OTHER ABNORMAL GLUCOSE: ICD-10-CM

## 2023-12-01 DIAGNOSIS — D3A.8 PRIMARY PANCREATIC NEUROENDOCRINE TUMOR: Primary | ICD-10-CM

## 2023-12-01 DIAGNOSIS — K76.0 HEPATIC STEATOSIS: ICD-10-CM

## 2023-12-01 LAB
ALBUMIN SERPL-MCNC: 4.5 G/DL (ref 3.5–5.2)
ALBUMIN/GLOB SERPL: 1.3 {RATIO} (ref 1–2.5)
ALP SERPL-CCNC: 144 U/L (ref 35–104)
ALT SERPL-CCNC: 32 U/L (ref 5–33)
ANION GAP SERPL CALCULATED.3IONS-SCNC: 11 MMOL/L (ref 9–17)
AST SERPL-CCNC: 37 U/L
BASOPHILS # BLD: 0 K/UL (ref 0–0.2)
BASOPHILS NFR BLD: 0 % (ref 0–2)
BILIRUB SERPL-MCNC: 0.5 MG/DL (ref 0.3–1.2)
BUN SERPL-MCNC: 15 MG/DL (ref 6–20)
CALCIUM SERPL-MCNC: 9.5 MG/DL (ref 8.6–10.4)
CHLORIDE SERPL-SCNC: 101 MMOL/L (ref 98–107)
CO2 SERPL-SCNC: 26 MMOL/L (ref 20–31)
CREAT SERPL-MCNC: 1 MG/DL (ref 0.5–0.9)
EOSINOPHIL # BLD: 0.98 K/UL (ref 0–0.4)
EOSINOPHILS RELATIVE PERCENT: 13 % (ref 1–4)
ERYTHROCYTE [DISTWIDTH] IN BLOOD BY AUTOMATED COUNT: 13.2 % (ref 12.5–15.4)
GFR SERPL CREATININE-BSD FRML MDRD: >60 ML/MIN/1.73M2
GLUCOSE SERPL-MCNC: 156 MG/DL (ref 70–99)
HCT VFR BLD AUTO: 38.6 % (ref 36–46)
HGB BLD-MCNC: 13.7 G/DL (ref 12–16)
LYMPHOCYTES NFR BLD: 1.88 K/UL (ref 1–4.8)
LYMPHOCYTES RELATIVE PERCENT: 25 % (ref 24–44)
MCH RBC QN AUTO: 32.5 PG (ref 26–34)
MCHC RBC AUTO-ENTMCNC: 35.4 G/DL (ref 31–37)
MCV RBC AUTO: 92 FL (ref 80–100)
MONOCYTES NFR BLD: 0.3 K/UL (ref 0.1–0.8)
MONOCYTES NFR BLD: 4 % (ref 1–7)
MORPHOLOGY: NORMAL
NEUTROPHILS NFR BLD: 58 % (ref 36–66)
NEUTS SEG NFR BLD: 4.34 K/UL (ref 1.8–7.7)
PLATELET # BLD AUTO: 241 K/UL (ref 140–450)
PMV BLD AUTO: 7.4 FL (ref 6–12)
POTASSIUM SERPL-SCNC: 3.9 MMOL/L (ref 3.7–5.3)
PROT SERPL-MCNC: 8.1 G/DL (ref 6.4–8.3)
RBC # BLD AUTO: 4.2 M/UL (ref 4–5.2)
SODIUM SERPL-SCNC: 138 MMOL/L (ref 135–144)
WBC OTHER # BLD: 7.5 K/UL (ref 3.5–11)

## 2023-12-01 PROCEDURE — 6360000002 HC RX W HCPCS: Performed by: INTERNAL MEDICINE

## 2023-12-01 PROCEDURE — G8484 FLU IMMUNIZE NO ADMIN: HCPCS | Performed by: INTERNAL MEDICINE

## 2023-12-01 PROCEDURE — 99211 OFF/OP EST MAY X REQ PHY/QHP: CPT | Performed by: INTERNAL MEDICINE

## 2023-12-01 PROCEDURE — 1036F TOBACCO NON-USER: CPT | Performed by: INTERNAL MEDICINE

## 2023-12-01 PROCEDURE — 85025 COMPLETE CBC W/AUTO DIFF WBC: CPT

## 2023-12-01 PROCEDURE — 3017F COLORECTAL CA SCREEN DOC REV: CPT | Performed by: INTERNAL MEDICINE

## 2023-12-01 PROCEDURE — G8427 DOCREV CUR MEDS BY ELIG CLIN: HCPCS | Performed by: INTERNAL MEDICINE

## 2023-12-01 PROCEDURE — 36415 COLL VENOUS BLD VENIPUNCTURE: CPT

## 2023-12-01 PROCEDURE — 99214 OFFICE O/P EST MOD 30 MIN: CPT | Performed by: INTERNAL MEDICINE

## 2023-12-01 PROCEDURE — 80053 COMPREHEN METABOLIC PANEL: CPT

## 2023-12-01 PROCEDURE — 96372 THER/PROPH/DIAG INJ SC/IM: CPT

## 2023-12-01 PROCEDURE — G8417 CALC BMI ABV UP PARAM F/U: HCPCS | Performed by: INTERNAL MEDICINE

## 2023-12-01 RX ORDER — ACETAMINOPHEN 325 MG/1
650 TABLET ORAL
OUTPATIENT
Start: 2023-12-29

## 2023-12-01 RX ORDER — SODIUM CHLORIDE 9 MG/ML
INJECTION, SOLUTION INTRAVENOUS CONTINUOUS
OUTPATIENT
Start: 2023-12-29

## 2023-12-01 RX ORDER — LANREOTIDE ACETATE 120 MG/.5ML
120 INJECTION SUBCUTANEOUS ONCE
OUTPATIENT
Start: 2023-12-29 | End: 2023-12-29

## 2023-12-01 RX ORDER — ALBUTEROL SULFATE 90 UG/1
4 AEROSOL, METERED RESPIRATORY (INHALATION) PRN
OUTPATIENT
Start: 2023-12-29

## 2023-12-01 RX ORDER — FAMOTIDINE 10 MG/ML
20 INJECTION, SOLUTION INTRAVENOUS
OUTPATIENT
Start: 2023-12-29

## 2023-12-01 RX ORDER — ONDANSETRON 2 MG/ML
8 INJECTION INTRAMUSCULAR; INTRAVENOUS
OUTPATIENT
Start: 2023-12-29

## 2023-12-01 RX ORDER — EPINEPHRINE 1 MG/ML
0.3 INJECTION, SOLUTION, CONCENTRATE INTRAVENOUS PRN
OUTPATIENT
Start: 2023-12-29

## 2023-12-01 RX ORDER — LANREOTIDE ACETATE 120 MG/.5ML
120 INJECTION SUBCUTANEOUS ONCE
Status: COMPLETED | OUTPATIENT
Start: 2023-12-01 | End: 2023-12-01

## 2023-12-01 RX ORDER — DIPHENHYDRAMINE HYDROCHLORIDE 50 MG/ML
50 INJECTION INTRAMUSCULAR; INTRAVENOUS
OUTPATIENT
Start: 2023-12-29

## 2023-12-01 RX ADMIN — LANREOTIDE ACETATE 120 MG: 120 INJECTION SUBCUTANEOUS at 16:10

## 2023-12-01 NOTE — PROGRESS NOTES
Ifeanyi Green                                                                                                                  12/1/2023  MRN:   [id number]  YOB: 1970  PCP:                           Gerardo Ahumada, MD  Referring Physician: No ref. provider found  Treating Physician Name: Leopold Donna, MD      Reason for visit:  Chief Complaint   Patient presents with    Follow-up     Injection to follow   Discussed treatment plan    Current problems:  Well-differentiated, grade 1, pancreatic neuroendocrine tumor, clinical stage T2 N0 M0  Elevated gastrin (patient on Protonix)  SMA focal severe stenosis, chronic per CT scan  Multiple comorbidities including diabetes mellitus obesity, renal insufficiency and severe hepatic steatosis  Family history of leukemia     Active and recent treatments:  Patient medically unfit for surgery  Lanreotide-11/2022    Interim History:  Patient presents to the clinic for a follow-up visit and for toxicity check and to review results of her blood work-up. Patient has been tolerating treatment without any unexpected or severe side effects. Denies hospitalization or ER visit. Appetite is good. Weight is stable. Nausea is controlled. Patient was seen by hepatobiliary surgery and felt to be too high risk for Whipple procedure at this time  During this visit patient's allergy, social, medical, surgical history and medications were reviewed and updated. Summary of Case/History:    Ifeanyi Green a 46 y. o.female is a patient who presents to the clinic to establish care and for further work-up and evaluation. Patient has multiple comorbidities including diabetes COPD hypertension morbid obesity. Patient initially presented to the emergency department with complaints of right lower quadrant pain radiating to the back. Work-up revealed acute kidney injury pyelonephritis.   Patient CT scan without contrast also showed a possible solid-appearing lesion in the

## 2023-12-01 NOTE — PROGRESS NOTES
Rosalba Baker here for md visit and lanreotide injection   Lanreotide injection given without incident  Pt tolerated well   Discharged to home

## 2023-12-29 ENCOUNTER — OFFICE VISIT (OUTPATIENT)
Dept: ONCOLOGY | Age: 53
End: 2023-12-29
Payer: COMMERCIAL

## 2023-12-29 ENCOUNTER — TELEPHONE (OUTPATIENT)
Dept: ONCOLOGY | Age: 53
End: 2023-12-29

## 2023-12-29 ENCOUNTER — HOSPITAL ENCOUNTER (OUTPATIENT)
Dept: INFUSION THERAPY | Age: 53
Discharge: HOME | End: 2023-12-29
Payer: COMMERCIAL

## 2023-12-29 VITALS
OXYGEN SATURATION: 99 % | DIASTOLIC BLOOD PRESSURE: 89 MMHG | WEIGHT: 186.7 LBS | HEIGHT: 60 IN | SYSTOLIC BLOOD PRESSURE: 132 MMHG | HEART RATE: 60 BPM | TEMPERATURE: 97.6 F | BODY MASS INDEX: 36.66 KG/M2

## 2023-12-29 DIAGNOSIS — Z79.899 HIGH RISK MEDICATION USE: ICD-10-CM

## 2023-12-29 DIAGNOSIS — R73.09 OTHER ABNORMAL GLUCOSE: ICD-10-CM

## 2023-12-29 DIAGNOSIS — D3A.8 PRIMARY PANCREATIC NEUROENDOCRINE TUMOR: Primary | ICD-10-CM

## 2023-12-29 DIAGNOSIS — R73.9 HYPERGLYCEMIA: ICD-10-CM

## 2023-12-29 LAB
ALBUMIN SERPL-MCNC: 4.2 G/DL (ref 3.5–5.2)
ALBUMIN/GLOB SERPL: 1.2 {RATIO} (ref 1–2.5)
ALP SERPL-CCNC: 172 U/L (ref 35–104)
ALT SERPL-CCNC: 47 U/L (ref 5–33)
ANION GAP SERPL CALCULATED.3IONS-SCNC: 12 MMOL/L (ref 9–17)
AST SERPL-CCNC: 46 U/L
BASOPHILS # BLD: 0 K/UL (ref 0–0.2)
BASOPHILS NFR BLD: 1 % (ref 0–2)
BILIRUB SERPL-MCNC: 0.6 MG/DL (ref 0.3–1.2)
BUN SERPL-MCNC: 16 MG/DL (ref 6–20)
CALCIUM SERPL-MCNC: 9.8 MG/DL (ref 8.6–10.4)
CHLORIDE SERPL-SCNC: 100 MMOL/L (ref 98–107)
CO2 SERPL-SCNC: 24 MMOL/L (ref 20–31)
CREAT SERPL-MCNC: 1 MG/DL (ref 0.5–0.9)
EOSINOPHIL # BLD: 1.1 K/UL (ref 0–0.4)
EOSINOPHILS RELATIVE PERCENT: 18 % (ref 1–4)
ERYTHROCYTE [DISTWIDTH] IN BLOOD BY AUTOMATED COUNT: 13.3 % (ref 12.5–15.4)
GFR SERPL CREATININE-BSD FRML MDRD: >60 ML/MIN/1.73M2
GLUCOSE SERPL-MCNC: 266 MG/DL (ref 70–99)
HCT VFR BLD AUTO: 36 % (ref 36–46)
HGB BLD-MCNC: 12.5 G/DL (ref 12–16)
LYMPHOCYTES NFR BLD: 1.2 K/UL (ref 1–4.8)
LYMPHOCYTES RELATIVE PERCENT: 20 % (ref 24–44)
MCH RBC QN AUTO: 32.2 PG (ref 26–34)
MCHC RBC AUTO-ENTMCNC: 34.7 G/DL (ref 31–37)
MCV RBC AUTO: 92.7 FL (ref 80–100)
MONOCYTES NFR BLD: 0.3 K/UL (ref 0.1–1.2)
MONOCYTES NFR BLD: 4 % (ref 2–11)
NEUTROPHILS NFR BLD: 57 % (ref 36–66)
NEUTS SEG NFR BLD: 3.5 K/UL (ref 1.8–7.7)
PLATELET # BLD AUTO: 263 K/UL (ref 140–450)
PMV BLD AUTO: 7.1 FL (ref 6–12)
POTASSIUM SERPL-SCNC: 4.8 MMOL/L (ref 3.7–5.3)
PROT SERPL-MCNC: 7.7 G/DL (ref 6.4–8.3)
RBC # BLD AUTO: 3.89 M/UL (ref 4–5.2)
SODIUM SERPL-SCNC: 136 MMOL/L (ref 135–144)
WBC OTHER # BLD: 6.2 K/UL (ref 3.5–11)

## 2023-12-29 PROCEDURE — 36415 COLL VENOUS BLD VENIPUNCTURE: CPT

## 2023-12-29 PROCEDURE — 6370000000 HC RX 637 (ALT 250 FOR IP): Performed by: INTERNAL MEDICINE

## 2023-12-29 PROCEDURE — 85025 COMPLETE CBC W/AUTO DIFF WBC: CPT

## 2023-12-29 PROCEDURE — 80053 COMPREHEN METABOLIC PANEL: CPT

## 2023-12-29 PROCEDURE — G8427 DOCREV CUR MEDS BY ELIG CLIN: HCPCS | Performed by: INTERNAL MEDICINE

## 2023-12-29 PROCEDURE — 99211 OFF/OP EST MAY X REQ PHY/QHP: CPT | Performed by: INTERNAL MEDICINE

## 2023-12-29 PROCEDURE — G8417 CALC BMI ABV UP PARAM F/U: HCPCS | Performed by: INTERNAL MEDICINE

## 2023-12-29 PROCEDURE — 6360000002 HC RX W HCPCS: Performed by: INTERNAL MEDICINE

## 2023-12-29 PROCEDURE — 1036F TOBACCO NON-USER: CPT | Performed by: INTERNAL MEDICINE

## 2023-12-29 PROCEDURE — 3017F COLORECTAL CA SCREEN DOC REV: CPT | Performed by: INTERNAL MEDICINE

## 2023-12-29 PROCEDURE — 99214 OFFICE O/P EST MOD 30 MIN: CPT | Performed by: INTERNAL MEDICINE

## 2023-12-29 PROCEDURE — G8484 FLU IMMUNIZE NO ADMIN: HCPCS | Performed by: INTERNAL MEDICINE

## 2023-12-29 PROCEDURE — 96372 THER/PROPH/DIAG INJ SC/IM: CPT

## 2023-12-29 RX ORDER — FAMOTIDINE 10 MG/ML
20 INJECTION, SOLUTION INTRAVENOUS
OUTPATIENT
Start: 2024-01-26

## 2023-12-29 RX ORDER — ACETAMINOPHEN 325 MG/1
650 TABLET ORAL
OUTPATIENT
Start: 2024-01-26

## 2023-12-29 RX ORDER — EPINEPHRINE 1 MG/ML
0.3 INJECTION, SOLUTION, CONCENTRATE INTRAVENOUS PRN
OUTPATIENT
Start: 2024-01-26

## 2023-12-29 RX ORDER — DIPHENHYDRAMINE HYDROCHLORIDE 50 MG/ML
50 INJECTION INTRAMUSCULAR; INTRAVENOUS
OUTPATIENT
Start: 2024-01-26

## 2023-12-29 RX ORDER — ONDANSETRON 2 MG/ML
8 INJECTION INTRAMUSCULAR; INTRAVENOUS
OUTPATIENT
Start: 2024-01-26

## 2023-12-29 RX ORDER — SODIUM CHLORIDE 9 MG/ML
INJECTION, SOLUTION INTRAVENOUS CONTINUOUS
OUTPATIENT
Start: 2024-01-26

## 2023-12-29 RX ORDER — LANREOTIDE ACETATE 120 MG/.5ML
120 INJECTION SUBCUTANEOUS ONCE
Status: COMPLETED | OUTPATIENT
Start: 2023-12-29 | End: 2023-12-29

## 2023-12-29 RX ORDER — ALBUTEROL SULFATE 90 UG/1
4 AEROSOL, METERED RESPIRATORY (INHALATION) PRN
OUTPATIENT
Start: 2024-01-26

## 2023-12-29 RX ORDER — LANREOTIDE ACETATE 120 MG/.5ML
120 INJECTION SUBCUTANEOUS ONCE
OUTPATIENT
Start: 2024-01-26 | End: 2024-01-26

## 2023-12-29 RX ADMIN — LANREOTIDE ACETATE 120 MG: 120 INJECTION SUBCUTANEOUS at 10:32

## 2023-12-29 RX ADMIN — INSULIN HUMAN 3 UNITS: 100 INJECTION, SOLUTION PARENTERAL at 10:33

## 2023-12-29 NOTE — PROGRESS NOTES
of CT PET. Overall stable disease  Patient seen by hepatobiliary surgery for second opinion. Patient felt to be very high risk for surgical intervention due to comorbidities. Systemic therapy recommended. Continue lanreotide  Patient currently does not have a primary care physician. I will make a referral per patient request.  Patient seen by vascular surgery for SMA stenosis recommend continued surveillance. Stenosis is chronic in nature  NCCN guidelines were reviewed and discussed with the patient. The diagnosis and care plan were discussed with the patient in detail. I discussed the natural history of the disease, prognosis, risks and goals of therapy and answered all the patients questions to the best of my ability. Patient expressed understanding and was in agreement. Maxime Osuna MD            This note is created with the assistance of a speech recognition program.  While intending to generate a document that actually reflects the content of the visit, the document can still have some errors including those of syntax and sound a like substitutions which may escape proof reading. It such instances, actual meaning can be extrapolated by contextual diversion.

## 2023-12-29 NOTE — TELEPHONE ENCOUNTER
Name: Bhupinder Calvert  : 1970  MRN: [id number]    Oncology Navigation Follow-Up Note    Contact Type:  Medical Oncology    Notes: Pt @ [facility] for Dr. Makenzie Harris f/u & tx. Met w/pt prior to f/u. Pt denied questions/concerns. Encouraged to contact writer prn. Will continue to follow.       Electronically signed by Jose Marte RN on 2023 at 10:53 AM

## 2023-12-29 NOTE — PROGRESS NOTES
Pt arrives ambulatory for LANREOTIDE Injection  Pt seen by Dr Carmita Merino - ok to proceed, also ordered 3units Insulin  Pt denies complaints or concerns  Injections complete without incident and patient discharged in stable condition  Next appt 1/26 Lanreotide

## 2024-01-02 ENCOUNTER — TELEPHONE (OUTPATIENT)
Dept: VASCULAR SURGERY | Age: 54
End: 2024-01-02

## 2024-01-02 NOTE — TELEPHONE ENCOUNTER
Attempted to call patient to reschedule 2/27 appointment due to Dr. De Paz not being available, phone rang busy.

## 2024-01-28 DIAGNOSIS — K31.819 GAVE (GASTRIC ANTRAL VASCULAR ECTASIA): ICD-10-CM

## 2024-01-28 DIAGNOSIS — D3A.8 PRIMARY PANCREATIC NEUROENDOCRINE TUMOR: ICD-10-CM

## 2024-01-29 RX ORDER — INSULIN GLARGINE 100 [IU]/ML
INJECTION, SOLUTION SUBCUTANEOUS
Qty: 15 ML | Refills: 1 | Status: SHIPPED | OUTPATIENT
Start: 2024-01-29

## 2024-02-02 ENCOUNTER — HOSPITAL ENCOUNTER (OUTPATIENT)
Dept: INFUSION THERAPY | Age: 54
Discharge: HOME | End: 2024-02-02
Payer: COMMERCIAL

## 2024-02-02 ENCOUNTER — TELEPHONE (OUTPATIENT)
Dept: ONCOLOGY | Age: 54
End: 2024-02-02

## 2024-02-02 ENCOUNTER — OFFICE VISIT (OUTPATIENT)
Dept: ONCOLOGY | Age: 54
End: 2024-02-02
Payer: COMMERCIAL

## 2024-02-02 VITALS
SYSTOLIC BLOOD PRESSURE: 154 MMHG | BODY MASS INDEX: 38.13 KG/M2 | HEART RATE: 76 BPM | DIASTOLIC BLOOD PRESSURE: 93 MMHG | TEMPERATURE: 97.5 F | WEIGHT: 192 LBS

## 2024-02-02 DIAGNOSIS — D3A.8 PRIMARY PANCREATIC NEUROENDOCRINE TUMOR: Primary | ICD-10-CM

## 2024-02-02 DIAGNOSIS — R73.9 HYPERGLYCEMIA: ICD-10-CM

## 2024-02-02 LAB
ALBUMIN SERPL-MCNC: 4.4 G/DL (ref 3.5–5.2)
ALBUMIN/GLOB SERPL: 1.2 {RATIO} (ref 1–2.5)
ALP SERPL-CCNC: 159 U/L (ref 35–104)
ALT SERPL-CCNC: 42 U/L (ref 5–33)
ANION GAP SERPL CALCULATED.3IONS-SCNC: 13 MMOL/L (ref 9–17)
AST SERPL-CCNC: 38 U/L
BASOPHILS # BLD: 0.1 K/UL (ref 0–0.2)
BASOPHILS NFR BLD: 1 % (ref 0–2)
BILIRUB SERPL-MCNC: 0.3 MG/DL (ref 0.3–1.2)
BUN SERPL-MCNC: 14 MG/DL (ref 6–20)
CALCIUM SERPL-MCNC: 9.9 MG/DL (ref 8.6–10.4)
CHLORIDE SERPL-SCNC: 102 MMOL/L (ref 98–107)
CO2 SERPL-SCNC: 24 MMOL/L (ref 20–31)
CREAT SERPL-MCNC: 0.8 MG/DL (ref 0.5–0.9)
EOSINOPHIL # BLD: 0.8 K/UL (ref 0–0.4)
EOSINOPHILS RELATIVE PERCENT: 15 % (ref 1–4)
ERYTHROCYTE [DISTWIDTH] IN BLOOD BY AUTOMATED COUNT: 13.2 % (ref 12.5–15.4)
GFR SERPL CREATININE-BSD FRML MDRD: >60 ML/MIN/1.73M2
GLUCOSE SERPL-MCNC: 249 MG/DL (ref 70–99)
HCT VFR BLD AUTO: 38.4 % (ref 36–46)
HGB BLD-MCNC: 13 G/DL (ref 12–16)
LYMPHOCYTES NFR BLD: 1.2 K/UL (ref 1–4.8)
LYMPHOCYTES RELATIVE PERCENT: 23 % (ref 24–44)
MCH RBC QN AUTO: 31.4 PG (ref 26–34)
MCHC RBC AUTO-ENTMCNC: 33.8 G/DL (ref 31–37)
MCV RBC AUTO: 93 FL (ref 80–100)
MONOCYTES NFR BLD: 0.2 K/UL (ref 0.1–1.2)
MONOCYTES NFR BLD: 5 % (ref 2–11)
NEUTROPHILS NFR BLD: 56 % (ref 36–66)
NEUTS SEG NFR BLD: 2.9 K/UL (ref 1.8–7.7)
PLATELET # BLD AUTO: 210 K/UL (ref 140–450)
PMV BLD AUTO: 7.5 FL (ref 6–12)
POTASSIUM SERPL-SCNC: 3.9 MMOL/L (ref 3.7–5.3)
PROT SERPL-MCNC: 8 G/DL (ref 6.4–8.3)
RBC # BLD AUTO: 4.12 M/UL (ref 4–5.2)
SODIUM SERPL-SCNC: 139 MMOL/L (ref 135–144)
WBC OTHER # BLD: 5.2 K/UL (ref 3.5–11)

## 2024-02-02 PROCEDURE — 1036F TOBACCO NON-USER: CPT | Performed by: INTERNAL MEDICINE

## 2024-02-02 PROCEDURE — 85025 COMPLETE CBC W/AUTO DIFF WBC: CPT

## 2024-02-02 PROCEDURE — 3017F COLORECTAL CA SCREEN DOC REV: CPT | Performed by: INTERNAL MEDICINE

## 2024-02-02 PROCEDURE — 36415 COLL VENOUS BLD VENIPUNCTURE: CPT

## 2024-02-02 PROCEDURE — 99211 OFF/OP EST MAY X REQ PHY/QHP: CPT | Performed by: INTERNAL MEDICINE

## 2024-02-02 PROCEDURE — 96372 THER/PROPH/DIAG INJ SC/IM: CPT

## 2024-02-02 PROCEDURE — 99214 OFFICE O/P EST MOD 30 MIN: CPT | Performed by: INTERNAL MEDICINE

## 2024-02-02 PROCEDURE — G8484 FLU IMMUNIZE NO ADMIN: HCPCS | Performed by: INTERNAL MEDICINE

## 2024-02-02 PROCEDURE — G8427 DOCREV CUR MEDS BY ELIG CLIN: HCPCS | Performed by: INTERNAL MEDICINE

## 2024-02-02 PROCEDURE — 6370000000 HC RX 637 (ALT 250 FOR IP): Performed by: INTERNAL MEDICINE

## 2024-02-02 PROCEDURE — G8417 CALC BMI ABV UP PARAM F/U: HCPCS | Performed by: INTERNAL MEDICINE

## 2024-02-02 PROCEDURE — 6360000002 HC RX W HCPCS: Performed by: INTERNAL MEDICINE

## 2024-02-02 PROCEDURE — 80053 COMPREHEN METABOLIC PANEL: CPT

## 2024-02-02 RX ORDER — LANREOTIDE ACETATE 120 MG/.5ML
120 INJECTION SUBCUTANEOUS ONCE
OUTPATIENT
Start: 2024-02-23 | End: 2024-02-23

## 2024-02-02 RX ORDER — EPINEPHRINE 1 MG/ML
0.3 INJECTION, SOLUTION, CONCENTRATE INTRAVENOUS PRN
OUTPATIENT
Start: 2024-02-23

## 2024-02-02 RX ORDER — ACETAMINOPHEN 325 MG/1
650 TABLET ORAL
OUTPATIENT
Start: 2024-02-23

## 2024-02-02 RX ORDER — ONDANSETRON 2 MG/ML
8 INJECTION INTRAMUSCULAR; INTRAVENOUS
OUTPATIENT
Start: 2024-02-23

## 2024-02-02 RX ORDER — DIPHENHYDRAMINE HYDROCHLORIDE 50 MG/ML
50 INJECTION INTRAMUSCULAR; INTRAVENOUS
OUTPATIENT
Start: 2024-02-23

## 2024-02-02 RX ORDER — FAMOTIDINE 10 MG/ML
20 INJECTION, SOLUTION INTRAVENOUS
OUTPATIENT
Start: 2024-02-23

## 2024-02-02 RX ORDER — SODIUM CHLORIDE 9 MG/ML
INJECTION, SOLUTION INTRAVENOUS CONTINUOUS
OUTPATIENT
Start: 2024-02-23

## 2024-02-02 RX ORDER — LANREOTIDE ACETATE 120 MG/.5ML
120 INJECTION SUBCUTANEOUS ONCE
Status: COMPLETED | OUTPATIENT
Start: 2024-02-02 | End: 2024-02-02

## 2024-02-02 RX ORDER — ALBUTEROL SULFATE 90 UG/1
4 AEROSOL, METERED RESPIRATORY (INHALATION) PRN
OUTPATIENT
Start: 2024-02-23

## 2024-02-02 RX ADMIN — INSULIN HUMAN 4 UNITS: 100 INJECTION, SOLUTION PARENTERAL at 10:51

## 2024-02-02 RX ADMIN — LANREOTIDE ACETATE 120 MG: 120 INJECTION SUBCUTANEOUS at 10:53

## 2024-02-02 NOTE — TELEPHONE ENCOUNTER
nsulin S/c 4 units short acting x 1  Tx today   Rv in 5 weeks with tx ( push out next tx by 1 week )       Injection pushed per md    Tx today as scheduled    Rv scheduled for 3/6/24 @ 9:00am with injection to follow    PT was given AVS and appt schedule    Electronically signed by Uzma Hurd on 2/2/2024 at 10:23 AM

## 2024-02-02 NOTE — PROGRESS NOTES
Marilyn Campo                                                                                                                  2/2/2024  MRN:   [id number]  YOB: 1970  PCP:                           Mauri Harmon MD  Referring Physician: No ref. provider found  Treating Physician Name: ADAMA SCHWAB MD      Reason for visit:  Chief Complaint   Patient presents with    Follow-up     Review status of disease    Discuss Labs   Discussed treatment plan    Current problems:  Well-differentiated, grade 1, pancreatic neuroendocrine tumor, clinical stage T2 N0 M0  Elevated gastrin (patient on Protonix)  SMA focal severe stenosis, chronic per CT scan  Multiple comorbidities including diabetes mellitus obesity, renal insufficiency and severe hepatic steatosis  Family history of leukemia     Active and recent treatments:  Patient medically unfit for surgery  Lanreotide-11/2022    Interim History:  Patient presents to the clinic for a follow-up visit and for toxicity check and to review results of her blood work-up.  Patient has been tolerating treatment without any unexpected or severe side effects.  Denies hospitalization or ER visit.  Appetite is good.  Weight is stable.  Nausea is controlled.    During this visit patient's allergy, social, medical, surgical history and medications were reviewed and updated.    Summary of Case/History:    Marilyn Campo a 54 y.o.female is a patient who presents to the clinic to establish care and for further work-up and evaluation.  Patient has multiple comorbidities including diabetes COPD hypertension morbid obesity.  Patient initially presented to the emergency department with complaints of right lower quadrant pain radiating to the back.  Work-up revealed acute kidney injury pyelonephritis.  Patient CT scan without contrast also showed a possible solid-appearing lesion in the pancreatic uncinate process there was no biliary or pancreatic ductal dilatation noted.

## 2024-02-02 NOTE — TELEPHONE ENCOUNTER
[facility]  Outpatient Physical Therapy  Phone: [phone]   Fax: [phone]    Physical Therapy Daily Treatment Note  Date:  2021    Patient Name:  Jan Bliss    :  1967  MRN: [id number]  Medical/Treatment Diagnosis Information:  · Diagnosis: M25.511 (ICD-10-CM) - Right shoulder pain, unspecified chronicity  · Treatment Diagnosis: neck pain, referred shoulder pain, decreased shoulder ROM, impaired posture, decreased cervical ROM, decreased cervical joint mobility  Insurance/Certification information:  PT Insurance Information: [facility] MET - $30 [facility]  Physician Information:  Referring Practitioner: Ridge Mcconnell MD  Plan of care signed (Y/N): []  Yes [x]  No     Date of Patient follow up with Physician:      Progress Report: [x]  Yes  []  No     Date Range for reporting period:  Beginnin21  Ending: TBD    Progress report due (10 Rx/or 30 days whichever is less): visit #8 or     Recertification due (POC duration/ or 90 days whichever is less): visit #8 or     Visit # Insurance Allowable Auth required?  Date Range   1 61 PCY [x]  Yes?  []  No      Units approved Units used Date Range   TBD TBD TBD     Latex Allergy:  [x]NO      []YES  Preferred Language for Healthcare:   [x]English       []other:    Functional Scale:       Date assessed:  NDI: raw score = 6; dysfunction = 12%  21    Pain level:  2-6/10     SUBJECTIVE:  See eval    OBJECTIVE: See eval    RESTRICTIONS/PRECAUTIONS:     Exercises/Interventions:     Therapeutic Exercises (49417) Resistance / level Sets/sec Reps Notes          Cervical retraction  3'' 5    scap retractions  5'' 5    R UT stretch  10'' 2    TB rows  1 10    TB extensions  1 10    Doorframe pec stretch  10'' 3    Bilateral UE ER TB       TB Low T's       CC high rows       Cervical SNAGs              Therapeutic Activities (86122)                                          Neuromuscular Re-ed (27923) Name: Marilyn Campo  : 1970  MRN: [id number]    Oncology Navigation Follow-Up Note    Contact Type:  Medical Oncology    Notes: [facility] Dr. Mahan f/u & tx.  Met w/pt prior to f/u.  Pt denied questions/concerns.  Encouraged to contact writer prn.  Will continue to follow.      Electronically signed by Jennifer Pleitez RN on 2024 at 10:03 AM    Cervical tucks       Cervical tuck & lifts       Postural endurance @ wall       Wall W's                     Manual Intervention (51765)       Consider prone C/S PA mobs       Prone T/S mobs       Consider CT junction mobs/manip                              Modalities:     Pt. Education:  -patient educated on diagnosis, prognosis and expectations for rehab  -all patient questions were answered    Home Exercise Program:  Access Code: [email or web address]  URL: [employer]/  Date: 11/26/2021  Prepared by: Tori Calle     Exercises  Seated Passive Cervical Retraction - 1-3 x daily - 5 x weekly - 1-3 sets - 5 reps - 2-3 hold  Seated Scapular Retraction - 1-3 x daily - 5 x weekly - 13 sets - 5 reps - 3 hold  Seated Upper Trapezius Stretch - 1-3 x daily - 5 x weekly - 1 sets - 2-3 reps - 10 hold  Doorway Pec Stretch at 90 Degrees Abduction - 1-3 x daily - 5 x weekly - 1 sets - 4 reps - 10 hold  Standing Row with Anchored Resistance - 1 x daily - 4 x weekly - 3 sets - 10 reps  Shoulder Extension with Resistance - 1 x daily - 4 x weekly - 3 sets - 10 reps    Therapeutic Exercise and NMR EXR  [x] (19422) Provided verbal/tactile cueing for activities related to strengthening, flexibility, endurance, ROM for improvements in  [] LE / Lumbar: LE, proximal hip, and core control with self care, mobility, lifting, ambulation. [x] UE / Cervical: cervical, postural, scapular, scapulothoracic and UE control with self care, reaching, carrying, lifting, house/yardwork, driving, computer work.  [] (60843) Provided verbal/tactile cueing for activities related to improving balance, coordination, kinesthetic sense, posture, motor skill, proprioception to assist with   [] LE / lumbar: LE, proximal hip, and core control in self care, mobility, lifting, ambulation and eccentric single leg control.    [] UE / cervical: cervical, scapular, scapulothoracic and UE control with self care, reaching, carrying, lifting, house/yardwork, driving, computer work.   [] (99962) Therapist is in constant attendance of 2 or more patients providing skilled therapy interventions, but not providing any significant amount of measurable one-on-one time to either patient, for improvements in  [] LE / lumbar: LE, proximal hip, and core control in self care, mobility, lifting, ambulation and eccentric single leg control. [] UE / cervical: cervical, scapular, scapulothoracic and UE control with self care, reaching, carrying, lifting, house/yardwork, driving, computer work. NMR and Therapeutic Activities:    [] (87213 or 15703) Provided verbal/tactile cueing for activities related to improving balance, coordination, kinesthetic sense, posture, motor skill, proprioception and motor activation to allow for proper function of   [] LE: / Lumbar core, proximal hip and LE with self care and ADLs  [] UE / Cervical: cervical, postural, scapular, scapulothoracic and UE control with self care, carrying, lifting, driving, computer work.   [] (99340) Gait Re-education- Provided training and instruction to the patient for proper LE, core and proximal hip recruitment and positioning and eccentric body weight control with ambulation re-education including up and down stairs     Home Management Training / Self Care:  [] (57256) Provided self-care/home management training related to activities of daily living and compensatory training, and/or use of adaptive equipment for improvement with: ADLs and compensatory training, meal preparation, safety procedures and instruction in use of adaptive equipment, including bathing, grooming, dressing, personal hygiene, basic household cleaning and chores.      Home Exercise Program:    [x] (16533) Reviewed/Progressed HEP activities related to strengthening, flexibility, endurance, ROM of   [] LE / Lumbar: core, proximal hip and LE for functional self-care, mobility, lifting and ambulation/stair navigation   [x] UE / Cervical: cervical, postural, scapular, scapulothoracic and UE control with self care, reaching, carrying, lifting, house/yardwork, driving, computer work  [] (48868)Reviewed/Progressed HEP activities related to improving balance, coordination, kinesthetic sense, posture, motor skill, proprioception of   [] LE: core, proximal hip and LE for self care, mobility, lifting, and ambulation/stair navigation    [] UE / Cervical: cervical, postural,  scapular, scapulothoracic and UE control with self care, reaching, carrying, lifting, house/yardwork, driving, computer work    Manual Treatments:  PROM / STM / Oscillations-Mobs:  G-I, II, III, IV (PA's, Inf., Post.)  [] (09606) Provided manual therapy to mobilize LE, proximal hip and/or LS spine soft tissue/joints for the purpose of modulating pain, promoting relaxation,  increasing ROM, reducing/eliminating soft tissue swelling/inflammation/restriction, improving soft tissue extensibility and allowing for proper ROM for normal function with   [] LE / lumbar: self care, mobility, lifting and ambulation. [] UE / Cervical: self care, reaching, carrying, lifting, house/yardwork, driving, computer work. Modalities:  [] (80431) Vasopneumatic compression: Utilized vasopneumatic compression to decrease edema / swelling for the purpose of improving mobility and quad tone / recruitment which will allow for increased overall function including but not limited to self-care, transfers, ambulation, and ascending / descending stairs.      Charges:  Timed Code Treatment Minutes: 15   Total Treatment Minutes: 45     [x] EVAL - LOW (08814)   [] EVAL - MOD (07088)  [] EVAL - HIGH (78937)  [] RE-EVAL (96063)  [x] KL(00847) x 1      [] Ionto  [] NMR (61018) x       [] Vaso  [] Manual (73221) x       [] Ultrasound  [] TA x        [] Mech Traction (59846)  [] Aquatic Therapy x     [] ES (un) (04566):   [] Home Management Training x  [] ES(attended) (14538)   [] Dry Needling 1-2 muscles (15114):  [] Dry Needling 3+ muscles (171935)  [] Group:      [] Other:     GOALS:  Patient stated goal: to abolish R arm pain; increase neck movement/mobility  []? Progressing: []? Met: []? Not Met: []? Adjusted     Therapist goals for Patient:   Short Term Goals: To be achieved in: 2 weeks  1. Independent in HEP and progression per patient tolerance, in order to prevent re-injury. []? Progressing: []? Met: []? Not Met: []? Adjusted  2. Patient will have a decrease in pain to <2/10 on average to facilitate improvement in movement, function, and ADLs as indicated by Functional Deficits. []? Progressing: []? Met: []? Not Met: []? Adjusted     Long Term Goals: To be achieved in: 4 weeks  1. Disability index score of 6% or less for the NDI to assist with reaching prior level of function. []? Progressing: []? Met: []? Not Met: []? Adjusted  2. Patient will demonstrate increased cervical extension AROM to 42, >45deg cervical flexion without pain and cervical rotation of >45deg R/L to allow for proper joint functioning as indicated by patients Functional Deficits. []? Progressing: []? Met: []? Not Met: []? Adjusted  3. Patient will demonstrate an increase in Strength to 5/5 to allow for proper functional mobility as indicated by patients Functional Deficits. []? Progressing: []? Met: []? Not Met: []? Adjusted  4. Patient will return to functional activities including computer work >30 min without increased symptoms or restriction. []? Progressing: []? Met: []? Not Met: []? Adjusted  5. Patient will have a decrease in pain to 0/10 on average to facilitate improvement in movement, function, and ADLs as indicated by Functional Deficits. []? Progressing: []? Met: []? Not Met: []? Adjusted    Overall Progression Towards Functional goals/ Treatment Progress Update:  [] Patient is progressing as expected towards functional goals listed. [] Progression is slowed due to complexities/Impairments listed.   [] Progression has been slowed due to co-morbidities. [x] Plan just implemented, too soon to assess goals progression <30days   [] Goals require adjustment due to lack of progress  [] Patient is not progressing as expected and requires additional follow up with physician  [] Other    Persisting Functional Limitations/Impairments:  [x]Sleeping []Sitting               []Standing []Transfers        []Walking []Kneeling               []Stairs []Squatting / bending   []ADLs []Reaching  []Lifting  []Housework  []Driving [x]Job related tasks  [x]Sports/Recreation []Other:      ASSESSMENT:  See eval    Treatment/Activity Tolerance:  [x] Patient able to complete tx  [] Patient limited by fatigue  [] Patient limited by pain  [] Patient limited by other medical complications  [] Other:     Prognosis: [x] Good [] Fair  [] Poor    Patient Requires Follow-up: [x] Yes  [] No    Plan for next treatment session: SEE FLOW ABOVE    PLAN: See eval. PT 2x / week for 4 weeks. [] Continue per plan of care [] Alter current plan (see comments)  [x] Plan of care initiated [] Hold pending MD visit [] Discharge    Electronically signed by: Pratik Mora, PT PT, DPT, OMT-C    Note: If patient does not return for scheduled/ recommended follow up visits, this note will serve as a discharge from care along with most recent update on progress.

## 2024-02-02 NOTE — PROGRESS NOTES
Pt here for Lanreotide.  Pt was seen by Dr. Mahan, \"  Insulin S/c 4 units short acting x 1  Tx today   Rv in 5 weeks with tx ( push out next tx by 1 week )\"  Injections adminstered as ordered.  Pt tolerated well.  Pt to return 3/6/24.  Pt discharged.

## 2024-02-26 ENCOUNTER — TELEPHONE (OUTPATIENT)
Dept: CARDIOTHORACIC SURGERY | Age: 54
End: 2024-02-26

## 2024-02-28 NOTE — FLOWSHEET NOTE
[x] [facility]  [address]    [phone]  F: [phone]   [] [address]  [facility] [address]   [address]  P: [phone]  F: [phone]  [] [address] &  Therapy  [address]  P: [phone]  F: [phone] [] [address]  P: [phone]  F: [phone]  [] [address]  [address] [facility] [address]   [address]   [place]: [phone]  F: [phone]   [] [facility]  [address] [facility] [address]  [place]: [phone]   F: [phone]     Physical Therapy Cancel/No Show note    Date: 8/15/2022  Patient: Eron Dias  : 1970  MRN: [id number]    Cancels/No Shows to date: 0    For today's appointment patient:    [x]  Cancelled    [x] Rescheduled appointment    [] No-show     Reason given by patient:    []  Patient ill    [x]  Conflicting appointment    [] No transportation      [] Conflict with work    [] No reason given    [] Weather related    [] [id number]    [] Other:      Comments:        [x] Next appointment was confirmed    Electronically signed by: Tammie Neal PTA
hide

## 2024-03-04 RX ORDER — PANTOPRAZOLE SODIUM 40 MG/1
80 TABLET, DELAYED RELEASE ORAL
Qty: 60 TABLET | Refills: 5 | Status: SHIPPED | OUTPATIENT
Start: 2024-03-04

## 2024-03-05 ENCOUNTER — TELEPHONE (OUTPATIENT)
Dept: ONCOLOGY | Age: 54
End: 2024-03-05

## 2024-03-05 NOTE — TELEPHONE ENCOUNTER
7:25  time scheduled through [employer] for 03/06. Trip #[id number]. Call [phone] when ready for . Pt requesting information be sent to phone number [phone]. Pt updated.

## 2024-03-06 ENCOUNTER — TELEPHONE (OUTPATIENT)
Dept: ONCOLOGY | Age: 54
End: 2024-03-06

## 2024-03-06 ENCOUNTER — HOSPITAL ENCOUNTER (OUTPATIENT)
Dept: INFUSION THERAPY | Age: 54
Discharge: HOME | End: 2024-03-06
Payer: COMMERCIAL

## 2024-03-06 ENCOUNTER — OFFICE VISIT (OUTPATIENT)
Dept: ONCOLOGY | Age: 54
End: 2024-03-06
Payer: COMMERCIAL

## 2024-03-06 VITALS
DIASTOLIC BLOOD PRESSURE: 100 MMHG | TEMPERATURE: 98 F | WEIGHT: 189.6 LBS | SYSTOLIC BLOOD PRESSURE: 152 MMHG | HEART RATE: 99 BPM | BODY MASS INDEX: 37.65 KG/M2

## 2024-03-06 DIAGNOSIS — K31.819 GAVE (GASTRIC ANTRAL VASCULAR ECTASIA): ICD-10-CM

## 2024-03-06 DIAGNOSIS — R73.9 HYPERGLYCEMIA: ICD-10-CM

## 2024-03-06 DIAGNOSIS — K86.89 PANCREATIC MASS: Primary | ICD-10-CM

## 2024-03-06 DIAGNOSIS — D3A.8 PRIMARY PANCREATIC NEUROENDOCRINE TUMOR: Primary | ICD-10-CM

## 2024-03-06 LAB
ALBUMIN SERPL-MCNC: 4.2 G/DL (ref 3.5–5.2)
ALBUMIN/GLOB SERPL: 1.3 {RATIO} (ref 1–2.5)
ALP SERPL-CCNC: 180 U/L (ref 35–104)
ALT SERPL-CCNC: 35 U/L (ref 5–33)
ANION GAP SERPL CALCULATED.3IONS-SCNC: 10 MMOL/L (ref 9–17)
AST SERPL-CCNC: 33 U/L
BASOPHILS # BLD: 0 K/UL (ref 0–0.2)
BASOPHILS NFR BLD: 1 % (ref 0–2)
BILIRUB SERPL-MCNC: 0.4 MG/DL (ref 0.3–1.2)
BUN SERPL-MCNC: 15 MG/DL (ref 6–20)
CALCIUM SERPL-MCNC: 10 MG/DL (ref 8.6–10.4)
CHLORIDE SERPL-SCNC: 102 MMOL/L (ref 98–107)
CO2 SERPL-SCNC: 25 MMOL/L (ref 20–31)
CREAT SERPL-MCNC: 0.9 MG/DL (ref 0.5–0.9)
EOSINOPHIL # BLD: 0.5 K/UL (ref 0–0.4)
EOSINOPHILS RELATIVE PERCENT: 10 % (ref 1–4)
ERYTHROCYTE [DISTWIDTH] IN BLOOD BY AUTOMATED COUNT: 13.3 % (ref 12.5–15.4)
GFR SERPL CREATININE-BSD FRML MDRD: >60 ML/MIN/1.73M2
GLUCOSE SERPL-MCNC: 374 MG/DL (ref 70–99)
HCT VFR BLD AUTO: 36.2 % (ref 36–46)
HGB BLD-MCNC: 12.3 G/DL (ref 12–16)
LYMPHOCYTES NFR BLD: 1.2 K/UL (ref 1–4.8)
LYMPHOCYTES RELATIVE PERCENT: 27 % (ref 24–44)
MCH RBC QN AUTO: 31.4 PG (ref 26–34)
MCHC RBC AUTO-ENTMCNC: 33.9 G/DL (ref 31–37)
MCV RBC AUTO: 92.4 FL (ref 80–100)
MONOCYTES NFR BLD: 0.3 K/UL (ref 0.1–1.2)
MONOCYTES NFR BLD: 6 % (ref 2–11)
NEUTROPHILS NFR BLD: 56 % (ref 36–66)
NEUTS SEG NFR BLD: 2.5 K/UL (ref 1.8–7.7)
PLATELET # BLD AUTO: 225 K/UL (ref 140–450)
PMV BLD AUTO: 7.4 FL (ref 6–12)
POTASSIUM SERPL-SCNC: 4 MMOL/L (ref 3.7–5.3)
PROT SERPL-MCNC: 7.4 G/DL (ref 6.4–8.3)
RBC # BLD AUTO: 3.92 M/UL (ref 4–5.2)
SODIUM SERPL-SCNC: 137 MMOL/L (ref 135–144)
WBC OTHER # BLD: 4.5 K/UL (ref 3.5–11)

## 2024-03-06 PROCEDURE — G8417 CALC BMI ABV UP PARAM F/U: HCPCS | Performed by: INTERNAL MEDICINE

## 2024-03-06 PROCEDURE — 1036F TOBACCO NON-USER: CPT | Performed by: INTERNAL MEDICINE

## 2024-03-06 PROCEDURE — 85025 COMPLETE CBC W/AUTO DIFF WBC: CPT

## 2024-03-06 PROCEDURE — 99214 OFFICE O/P EST MOD 30 MIN: CPT | Performed by: INTERNAL MEDICINE

## 2024-03-06 PROCEDURE — 36415 COLL VENOUS BLD VENIPUNCTURE: CPT

## 2024-03-06 PROCEDURE — 80053 COMPREHEN METABOLIC PANEL: CPT

## 2024-03-06 PROCEDURE — 3017F COLORECTAL CA SCREEN DOC REV: CPT | Performed by: INTERNAL MEDICINE

## 2024-03-06 PROCEDURE — G8427 DOCREV CUR MEDS BY ELIG CLIN: HCPCS | Performed by: INTERNAL MEDICINE

## 2024-03-06 PROCEDURE — 99211 OFF/OP EST MAY X REQ PHY/QHP: CPT | Performed by: INTERNAL MEDICINE

## 2024-03-06 PROCEDURE — 96372 THER/PROPH/DIAG INJ SC/IM: CPT

## 2024-03-06 PROCEDURE — 6370000000 HC RX 637 (ALT 250 FOR IP): Performed by: INTERNAL MEDICINE

## 2024-03-06 PROCEDURE — 6360000002 HC RX W HCPCS: Performed by: INTERNAL MEDICINE

## 2024-03-06 PROCEDURE — G8484 FLU IMMUNIZE NO ADMIN: HCPCS | Performed by: INTERNAL MEDICINE

## 2024-03-06 RX ORDER — ACETAMINOPHEN 325 MG/1
650 TABLET ORAL
OUTPATIENT
Start: 2024-03-29

## 2024-03-06 RX ORDER — ALBUTEROL SULFATE 90 UG/1
4 AEROSOL, METERED RESPIRATORY (INHALATION) PRN
OUTPATIENT
Start: 2024-03-29

## 2024-03-06 RX ORDER — FAMOTIDINE 10 MG/ML
20 INJECTION, SOLUTION INTRAVENOUS
OUTPATIENT
Start: 2024-03-29

## 2024-03-06 RX ORDER — ONDANSETRON 2 MG/ML
8 INJECTION INTRAMUSCULAR; INTRAVENOUS
OUTPATIENT
Start: 2024-03-29

## 2024-03-06 RX ORDER — DIPHENHYDRAMINE HYDROCHLORIDE 50 MG/ML
50 INJECTION INTRAMUSCULAR; INTRAVENOUS
OUTPATIENT
Start: 2024-03-29

## 2024-03-06 RX ORDER — LANREOTIDE ACETATE 120 MG/.5ML
120 INJECTION SUBCUTANEOUS ONCE
Status: COMPLETED | OUTPATIENT
Start: 2024-03-06 | End: 2024-03-06

## 2024-03-06 RX ORDER — LANREOTIDE ACETATE 120 MG/.5ML
120 INJECTION SUBCUTANEOUS ONCE
OUTPATIENT
Start: 2024-03-29 | End: 2024-03-29

## 2024-03-06 RX ORDER — EPINEPHRINE 1 MG/ML
0.3 INJECTION, SOLUTION, CONCENTRATE INTRAVENOUS PRN
OUTPATIENT
Start: 2024-03-29

## 2024-03-06 RX ORDER — SODIUM CHLORIDE 9 MG/ML
INJECTION, SOLUTION INTRAVENOUS CONTINUOUS
OUTPATIENT
Start: 2024-03-29

## 2024-03-06 RX ADMIN — INSULIN HUMAN 6 UNITS: 100 INJECTION, SOLUTION PARENTERAL at 09:52

## 2024-03-06 RX ADMIN — LANREOTIDE ACETATE 120 MG: 120 INJECTION SUBCUTANEOUS at 09:50

## 2024-03-06 NOTE — PROGRESS NOTES
surveillance.  Stenosis is chronic in nature  NCCN guidelines were reviewed and discussed with the patient.  The diagnosis and care plan were discussed with the patient in detail. I discussed the natural history of the disease, prognosis, risks and goals of therapy and answered all the patients questions to the best of my ability.  Patient expressed understanding and was in agreement.    ADAMA SCHWAB MD            This note is created with the assistance of a speech recognition program.  While intending to generate a document that actually reflects the content of the visit, the document can still have some errors including those of syntax and sound a like substitutions which may escape proof reading.  It such instances, actual meaning can be extrapolated by contextual diversion.

## 2024-03-06 NOTE — TELEPHONE ENCOUNTER
Give regular insulin 6 units S/c x 1   Tx today   Rv in 4 weeks       Tx today as scheduled    Rv scheduled for 4/3/24 @ 9:00am with tx to follow    PT was given AVS and appt schedule    Electronically signed by Uzma Hurd on 3/6/2024 at 9:44 AM

## 2024-03-06 NOTE — TELEPHONE ENCOUNTER
Name: Marilyn Campo  : 1970  MRN: [id number]    Oncology Navigation Follow-Up Note    Contact Type:  Medical Oncology    Notes: [facility] for Dr. Mahan f/u & tx.  Met w/pt prior to f/u.  Pt noted tearful & crying.  Pt stated dog sitting for friend & friends dog killed pt's cat last night.  Support given.  Pt denied questions/concerns.  Encouraged to contact writer prn.  Will continue to follow.      Electronically signed by Jennifer Pleitez RN on 3/6/2024 at 10:04 AM

## 2024-03-06 NOTE — PROGRESS NOTES
Pt  seen for LANREOTIDE Injection.  Injection competed in MD office   Pt seen by Dr Negrito park to proceed, also ordered 6 units Regular Insulin for Blood sugar of 374  Pt denies complaints or concerns  Injections complete without incident and patient discharged in stable condition  Next appt 4/3 Lanreotide

## 2024-04-24 ENCOUNTER — TELEPHONE (OUTPATIENT)
Dept: ONCOLOGY | Age: 54
End: 2024-04-24

## 2024-04-24 ENCOUNTER — OFFICE VISIT (OUTPATIENT)
Dept: ONCOLOGY | Age: 54
End: 2024-04-24
Payer: COMMERCIAL

## 2024-04-24 ENCOUNTER — HOSPITAL ENCOUNTER (OUTPATIENT)
Dept: INFUSION THERAPY | Age: 54
Discharge: HOME | End: 2024-04-24
Payer: COMMERCIAL

## 2024-04-24 VITALS
HEART RATE: 80 BPM | WEIGHT: 185.6 LBS | DIASTOLIC BLOOD PRESSURE: 80 MMHG | BODY MASS INDEX: 36.86 KG/M2 | RESPIRATION RATE: 14 BRPM | SYSTOLIC BLOOD PRESSURE: 149 MMHG | TEMPERATURE: 97 F

## 2024-04-24 DIAGNOSIS — D3A.8 PRIMARY PANCREATIC NEUROENDOCRINE TUMOR: Primary | ICD-10-CM

## 2024-04-24 DIAGNOSIS — K31.819 GAVE (GASTRIC ANTRAL VASCULAR ECTASIA): ICD-10-CM

## 2024-04-24 LAB
ALBUMIN SERPL-MCNC: 4.6 G/DL (ref 3.5–5.2)
ALBUMIN/GLOB SERPL: 1.2 {RATIO} (ref 1–2.5)
ALP SERPL-CCNC: 274 U/L (ref 35–104)
ALT SERPL-CCNC: 39 U/L (ref 5–33)
ANION GAP SERPL CALCULATED.3IONS-SCNC: 11 MMOL/L (ref 9–17)
AST SERPL-CCNC: 37 U/L
BASOPHILS # BLD: 0.1 K/UL (ref 0–0.2)
BASOPHILS NFR BLD: 1 % (ref 0–2)
BILIRUB SERPL-MCNC: 0.6 MG/DL (ref 0.3–1.2)
BUN SERPL-MCNC: 11 MG/DL (ref 6–20)
CALCIUM SERPL-MCNC: 9.9 MG/DL (ref 8.6–10.4)
CHLORIDE SERPL-SCNC: 101 MMOL/L (ref 98–107)
CO2 SERPL-SCNC: 26 MMOL/L (ref 20–31)
CREAT SERPL-MCNC: 0.9 MG/DL (ref 0.5–0.9)
EOSINOPHIL # BLD: 0.2 K/UL (ref 0–0.4)
EOSINOPHILS RELATIVE PERCENT: 4 % (ref 1–4)
ERYTHROCYTE [DISTWIDTH] IN BLOOD BY AUTOMATED COUNT: 12.9 % (ref 12.5–15.4)
GFR SERPL CREATININE-BSD FRML MDRD: 75 ML/MIN/1.73M2
GLUCOSE SERPL-MCNC: 288 MG/DL (ref 70–99)
HCT VFR BLD AUTO: 39.5 % (ref 36–46)
HGB BLD-MCNC: 13.5 G/DL (ref 12–16)
LYMPHOCYTES NFR BLD: 1.1 K/UL (ref 1–4.8)
LYMPHOCYTES RELATIVE PERCENT: 20 % (ref 24–44)
MCH RBC QN AUTO: 30.9 PG (ref 26–34)
MCHC RBC AUTO-ENTMCNC: 34.1 G/DL (ref 31–37)
MCV RBC AUTO: 90.6 FL (ref 80–100)
MONOCYTES NFR BLD: 0.3 K/UL (ref 0.1–1.2)
MONOCYTES NFR BLD: 6 % (ref 2–11)
NEUTROPHILS NFR BLD: 69 % (ref 36–66)
NEUTS SEG NFR BLD: 3.8 K/UL (ref 1.8–7.7)
PLATELET # BLD AUTO: 310 K/UL (ref 140–450)
PMV BLD AUTO: 7.2 FL (ref 6–12)
POTASSIUM SERPL-SCNC: 4.8 MMOL/L (ref 3.7–5.3)
PROT SERPL-MCNC: 8.4 G/DL (ref 6.4–8.3)
RBC # BLD AUTO: 4.35 M/UL (ref 4–5.2)
SODIUM SERPL-SCNC: 138 MMOL/L (ref 135–144)
WBC OTHER # BLD: 5.6 K/UL (ref 3.5–11)

## 2024-04-24 PROCEDURE — 3017F COLORECTAL CA SCREEN DOC REV: CPT | Performed by: INTERNAL MEDICINE

## 2024-04-24 PROCEDURE — 99214 OFFICE O/P EST MOD 30 MIN: CPT | Performed by: INTERNAL MEDICINE

## 2024-04-24 PROCEDURE — 99211 OFF/OP EST MAY X REQ PHY/QHP: CPT | Performed by: INTERNAL MEDICINE

## 2024-04-24 PROCEDURE — 6370000000 HC RX 637 (ALT 250 FOR IP): Performed by: INTERNAL MEDICINE

## 2024-04-24 PROCEDURE — G8417 CALC BMI ABV UP PARAM F/U: HCPCS | Performed by: INTERNAL MEDICINE

## 2024-04-24 PROCEDURE — 36415 COLL VENOUS BLD VENIPUNCTURE: CPT

## 2024-04-24 PROCEDURE — 80053 COMPREHEN METABOLIC PANEL: CPT

## 2024-04-24 PROCEDURE — 96372 THER/PROPH/DIAG INJ SC/IM: CPT

## 2024-04-24 PROCEDURE — 1036F TOBACCO NON-USER: CPT | Performed by: INTERNAL MEDICINE

## 2024-04-24 PROCEDURE — 6360000002 HC RX W HCPCS: Performed by: INTERNAL MEDICINE

## 2024-04-24 PROCEDURE — 85025 COMPLETE CBC W/AUTO DIFF WBC: CPT

## 2024-04-24 PROCEDURE — G8428 CUR MEDS NOT DOCUMENT: HCPCS | Performed by: INTERNAL MEDICINE

## 2024-04-24 RX ORDER — LANREOTIDE ACETATE 120 MG/.5ML
120 INJECTION SUBCUTANEOUS ONCE
Status: COMPLETED | OUTPATIENT
Start: 2024-04-24 | End: 2024-04-24

## 2024-04-24 RX ORDER — EPINEPHRINE 1 MG/ML
0.3 INJECTION, SOLUTION, CONCENTRATE INTRAVENOUS PRN
OUTPATIENT
Start: 2024-05-01

## 2024-04-24 RX ORDER — FAMOTIDINE 10 MG/ML
20 INJECTION, SOLUTION INTRAVENOUS
OUTPATIENT
Start: 2024-05-01

## 2024-04-24 RX ORDER — ONDANSETRON 4 MG/1
4 TABLET, ORALLY DISINTEGRATING ORAL ONCE
Status: COMPLETED | OUTPATIENT
Start: 2024-04-24 | End: 2024-04-24

## 2024-04-24 RX ORDER — ONDANSETRON 2 MG/ML
8 INJECTION INTRAMUSCULAR; INTRAVENOUS
OUTPATIENT
Start: 2024-05-01

## 2024-04-24 RX ORDER — SODIUM CHLORIDE 9 MG/ML
INJECTION, SOLUTION INTRAVENOUS CONTINUOUS
OUTPATIENT
Start: 2024-05-01

## 2024-04-24 RX ORDER — LANREOTIDE ACETATE 120 MG/.5ML
120 INJECTION SUBCUTANEOUS ONCE
OUTPATIENT
Start: 2024-05-01 | End: 2024-05-01

## 2024-04-24 RX ORDER — ACETAMINOPHEN 325 MG/1
650 TABLET ORAL
OUTPATIENT
Start: 2024-05-01

## 2024-04-24 RX ORDER — DIPHENHYDRAMINE HYDROCHLORIDE 50 MG/ML
50 INJECTION INTRAMUSCULAR; INTRAVENOUS
OUTPATIENT
Start: 2024-05-01

## 2024-04-24 RX ORDER — ALBUTEROL SULFATE 90 UG/1
4 AEROSOL, METERED RESPIRATORY (INHALATION) PRN
OUTPATIENT
Start: 2024-05-01

## 2024-04-24 RX ADMIN — LANREOTIDE ACETATE 120 MG: 120 INJECTION SUBCUTANEOUS at 10:19

## 2024-04-24 RX ADMIN — ONDANSETRON 4 MG: 4 TABLET, ORALLY DISINTEGRATING ORAL at 10:25

## 2024-04-24 NOTE — PROGRESS NOTES
Marilyn Campo                                                                                                                  4/24/2024  MRN:   [id number]  YOB: 1970  PCP:                           Mauri Harmon MD  Referring Physician: No ref. provider found  Treating Physician Name: ADAMA SCHWAB MD      Reason for visit:  Chief Complaint   Patient presents with    Follow-up     Review of disease process   Discussed treatment plan    Current problems:  Well-differentiated, grade 1, pancreatic neuroendocrine tumor, clinical stage T2 N0 M0  Elevated gastrin (patient on Protonix)  SMA focal severe stenosis, chronic per CT scan  Multiple comorbidities including diabetes mellitus obesity, renal insufficiency and severe hepatic steatosis  Family history of leukemia     Active and recent treatments:  Patient medically unfit for surgery  Lanreotide-11/2022    Interim History:  Patient presents to the clinic for a follow-up visit and for toxicity check and to review results of her blood work-up.  Patient has been tolerating treatment without any unexpected or severe side effects.  Denies hospitalization or ER visit.  Appetite is good.  Weight is stable.  Nausea is controlled.  Patient missed her last treatment due to transportation issues.    During this visit patient's allergy, social, medical, surgical history and medications were reviewed and updated.    Summary of Case/History:    Marilyn Campo a 54 y.o.female is a patient who presents to the clinic to establish care and for further work-up and evaluation.  Patient has multiple comorbidities including diabetes COPD hypertension morbid obesity.  Patient initially presented to the emergency department with complaints of right lower quadrant pain radiating to the back.  Work-up revealed acute kidney injury pyelonephritis.  Patient CT scan without contrast also showed a possible solid-appearing lesion in the pancreatic uncinate process there was

## 2024-04-24 NOTE — TELEPHONE ENCOUNTER
Name: Marilyn Campo  : 1970  MRN: [id number]    Oncology Navigation Follow-Up Note    Contact Type:  Medical Oncology    Notes: Pt [facility] for Dr. Mahan f/u & tx.  Met w/pt prior to tx.  Pt stated missed last 2 appts r/t transportation issues.  Instructed  pt to contact [facility] , w/transportation issues.  [facility] contact # given.  Pt verbalized understanding & denied questions/concerns.  Encouraged to contact writer prn.  Will continue to follow.      Electronically signed by Jennifer Pleitez RN on 2024 at 9:37 AM

## 2024-04-24 NOTE — TELEPHONE ENCOUNTER
Zofran 4 mg po odt x 1 with octreotide injcetion  Rv in 4 weeks  Ct scan just before rv     CT scheduled for 5/2/24 @ 2:00pm @ [facility]      RV scheduled for 5/22/24 @ 9:45am    PT was given AVS and appt schedule    Electronically signed by Uzma Hurd on 4/24/2024 at 10:07 AM

## 2024-05-02 ENCOUNTER — HOSPITAL ENCOUNTER (OUTPATIENT)
Dept: CT IMAGING | Age: 54
Discharge: HOME | End: 2024-05-04
Attending: INTERNAL MEDICINE
Payer: COMMERCIAL

## 2024-05-02 DIAGNOSIS — K31.819 GAVE (GASTRIC ANTRAL VASCULAR ECTASIA): ICD-10-CM

## 2024-05-02 DIAGNOSIS — D3A.8 PRIMARY PANCREATIC NEUROENDOCRINE TUMOR: ICD-10-CM

## 2024-05-02 PROCEDURE — 74177 CT ABD & PELVIS W/CONTRAST: CPT

## 2024-05-02 PROCEDURE — 6360000004 HC RX CONTRAST MEDICATION: Performed by: INTERNAL MEDICINE

## 2024-05-02 RX ADMIN — IOPAMIDOL 75 ML: 755 INJECTION, SOLUTION INTRAVENOUS at 13:43

## 2024-05-20 ENCOUNTER — TELEPHONE (OUTPATIENT)
Dept: ONCOLOGY | Age: 54
End: 2024-05-20

## 2024-05-20 NOTE — TELEPHONE ENCOUNTER
received referral from Medical Oncology stating patient had called in asking about transportation assistance.  attempted to call patient but both numbers listed were disconnected.  checked with Medical Assistant who pulled up patient call log. Number entered in chart wrongly,  updated.  called patient and confirmed address.  set up transportation through insurance provider for 5/22.     Transportation details:  [place]  [phone]   ~ 8:45am  Confirmation #[id number]  Call for return ride

## 2024-05-22 ENCOUNTER — OFFICE VISIT (OUTPATIENT)
Dept: ONCOLOGY | Age: 54
End: 2024-05-22
Payer: MEDICARE

## 2024-05-22 ENCOUNTER — TELEPHONE (OUTPATIENT)
Dept: ONCOLOGY | Age: 54
End: 2024-05-22

## 2024-05-22 ENCOUNTER — HOSPITAL ENCOUNTER (OUTPATIENT)
Dept: INFUSION THERAPY | Age: 54
Discharge: HOME | End: 2024-05-22
Payer: MEDICARE

## 2024-05-22 VITALS
HEART RATE: 57 BPM | TEMPERATURE: 96.9 F | RESPIRATION RATE: 20 BRPM | SYSTOLIC BLOOD PRESSURE: 135 MMHG | DIASTOLIC BLOOD PRESSURE: 84 MMHG | BODY MASS INDEX: 36.72 KG/M2 | WEIGHT: 184.9 LBS

## 2024-05-22 DIAGNOSIS — D3A.8 PRIMARY PANCREATIC NEUROENDOCRINE TUMOR: Primary | ICD-10-CM

## 2024-05-22 DIAGNOSIS — K31.819 GAVE (GASTRIC ANTRAL VASCULAR ECTASIA): ICD-10-CM

## 2024-05-22 DIAGNOSIS — R73.9 HYPERGLYCEMIA: ICD-10-CM

## 2024-05-22 LAB
ALBUMIN SERPL-MCNC: 4.1 G/DL (ref 3.5–5.2)
ALBUMIN/GLOB SERPL: 1.2 {RATIO} (ref 1–2.5)
ALP SERPL-CCNC: 149 U/L (ref 35–104)
ALT SERPL-CCNC: <5 U/L (ref 5–33)
ANION GAP SERPL CALCULATED.3IONS-SCNC: 9 MMOL/L (ref 9–17)
AST SERPL-CCNC: 28 U/L
BASOPHILS # BLD: 0 K/UL (ref 0–0.2)
BASOPHILS NFR BLD: 0 % (ref 0–2)
BILIRUB SERPL-MCNC: 0.7 MG/DL (ref 0.3–1.2)
BUN SERPL-MCNC: 14 MG/DL (ref 6–20)
CALCIUM SERPL-MCNC: 9.7 MG/DL (ref 8.6–10.4)
CHLORIDE SERPL-SCNC: 103 MMOL/L (ref 98–107)
CO2 SERPL-SCNC: 26 MMOL/L (ref 20–31)
CREAT SERPL-MCNC: 1.1 MG/DL (ref 0.5–0.9)
EOSINOPHIL # BLD: 0.96 K/UL (ref 0–0.4)
EOSINOPHILS RELATIVE PERCENT: 16 % (ref 1–4)
ERYTHROCYTE [DISTWIDTH] IN BLOOD BY AUTOMATED COUNT: 13.4 % (ref 12.5–15.4)
GFR, ESTIMATED: 59 ML/MIN/1.73M2
GLUCOSE SERPL-MCNC: 241 MG/DL (ref 70–99)
HCT VFR BLD AUTO: 35.6 % (ref 36–46)
HGB BLD-MCNC: 12.2 G/DL (ref 12–16)
LYMPHOCYTES NFR BLD: 1.2 K/UL (ref 1–4.8)
LYMPHOCYTES RELATIVE PERCENT: 20 % (ref 24–44)
MCH RBC QN AUTO: 31.1 PG (ref 26–34)
MCHC RBC AUTO-ENTMCNC: 34.2 G/DL (ref 31–37)
MCV RBC AUTO: 90.9 FL (ref 80–100)
MONOCYTES NFR BLD: 0.42 K/UL (ref 0.1–0.8)
MONOCYTES NFR BLD: 7 % (ref 1–7)
MORPHOLOGY: NORMAL
NEUTROPHILS NFR BLD: 57 % (ref 36–66)
NEUTS SEG NFR BLD: 3.42 K/UL (ref 1.8–7.7)
PLATELET # BLD AUTO: 230 K/UL (ref 140–450)
PMV BLD AUTO: 7 FL (ref 6–12)
POTASSIUM SERPL-SCNC: 4.5 MMOL/L (ref 3.7–5.3)
PROT SERPL-MCNC: 7.6 G/DL (ref 6.4–8.3)
RBC # BLD AUTO: 3.92 M/UL (ref 4–5.2)
SODIUM SERPL-SCNC: 138 MMOL/L (ref 135–144)
WBC OTHER # BLD: 6 K/UL (ref 3.5–11)

## 2024-05-22 PROCEDURE — 96372 THER/PROPH/DIAG INJ SC/IM: CPT

## 2024-05-22 PROCEDURE — G8417 CALC BMI ABV UP PARAM F/U: HCPCS | Performed by: INTERNAL MEDICINE

## 2024-05-22 PROCEDURE — 1036F TOBACCO NON-USER: CPT | Performed by: INTERNAL MEDICINE

## 2024-05-22 PROCEDURE — 80053 COMPREHEN METABOLIC PANEL: CPT

## 2024-05-22 PROCEDURE — 85025 COMPLETE CBC W/AUTO DIFF WBC: CPT

## 2024-05-22 PROCEDURE — 99211 OFF/OP EST MAY X REQ PHY/QHP: CPT | Performed by: INTERNAL MEDICINE

## 2024-05-22 PROCEDURE — 3017F COLORECTAL CA SCREEN DOC REV: CPT | Performed by: INTERNAL MEDICINE

## 2024-05-22 PROCEDURE — 6360000002 HC RX W HCPCS: Performed by: INTERNAL MEDICINE

## 2024-05-22 PROCEDURE — G8427 DOCREV CUR MEDS BY ELIG CLIN: HCPCS | Performed by: INTERNAL MEDICINE

## 2024-05-22 PROCEDURE — 36415 COLL VENOUS BLD VENIPUNCTURE: CPT

## 2024-05-22 PROCEDURE — 6370000000 HC RX 637 (ALT 250 FOR IP): Performed by: INTERNAL MEDICINE

## 2024-05-22 PROCEDURE — 99214 OFFICE O/P EST MOD 30 MIN: CPT | Performed by: INTERNAL MEDICINE

## 2024-05-22 RX ORDER — EPINEPHRINE 1 MG/ML
0.3 INJECTION, SOLUTION, CONCENTRATE INTRAVENOUS PRN
OUTPATIENT
Start: 2024-06-19

## 2024-05-22 RX ORDER — FAMOTIDINE 10 MG/ML
20 INJECTION, SOLUTION INTRAVENOUS
OUTPATIENT
Start: 2024-06-19

## 2024-05-22 RX ORDER — ALBUTEROL SULFATE 90 UG/1
4 AEROSOL, METERED RESPIRATORY (INHALATION) PRN
OUTPATIENT
Start: 2024-06-19

## 2024-05-22 RX ORDER — ONDANSETRON 2 MG/ML
8 INJECTION INTRAMUSCULAR; INTRAVENOUS
OUTPATIENT
Start: 2024-06-19

## 2024-05-22 RX ORDER — SODIUM CHLORIDE 9 MG/ML
INJECTION, SOLUTION INTRAVENOUS CONTINUOUS
OUTPATIENT
Start: 2024-06-19

## 2024-05-22 RX ORDER — DIPHENHYDRAMINE HYDROCHLORIDE 50 MG/ML
50 INJECTION INTRAMUSCULAR; INTRAVENOUS
OUTPATIENT
Start: 2024-06-19

## 2024-05-22 RX ORDER — LANREOTIDE ACETATE 120 MG/.5ML
120 INJECTION SUBCUTANEOUS ONCE
OUTPATIENT
Start: 2024-06-19 | End: 2024-06-19

## 2024-05-22 RX ORDER — LANREOTIDE ACETATE 120 MG/.5ML
120 INJECTION SUBCUTANEOUS ONCE
Status: COMPLETED | OUTPATIENT
Start: 2024-05-22 | End: 2024-05-22

## 2024-05-22 RX ORDER — ACETAMINOPHEN 325 MG/1
650 TABLET ORAL
OUTPATIENT
Start: 2024-06-19

## 2024-05-22 RX ADMIN — LANREOTIDE ACETATE 120 MG: 120 INJECTION SUBCUTANEOUS at 11:22

## 2024-05-22 RX ADMIN — INSULIN HUMAN 4 UNITS: 100 INJECTION, SOLUTION PARENTERAL at 11:36

## 2024-05-22 NOTE — TELEPHONE ENCOUNTER
received call from Medical Oncology stating patient waiting on return ride.  called insurance provider and requested return ride.

## 2024-05-22 NOTE — TELEPHONE ENCOUNTER
Name: Marilyn Campo  : 1970  MRN: [id number]    Oncology Navigation Follow-Up Note    Contact Type:  Medical Oncology    Notes: [facility] for Dr. Mahan f/u & tx.  Accompanied in exam room.  Dr. Mahan discussed recent imaging results w/pt, tx options, & stressed importance scheduling w/PCP to address high glucose levels.  Pt w/questions, all addressed by Dr. Mahan.  Spoke w/[facility], ([phone]) notified navigating oncology care & Dr. Mahan requesting PCP f/u to address high glucose levels.  Pt scheduled w/Zandra Turk CNP  @ 0915.  Pt updated on appt details, verbalized understanding, & denied questions/concerns.  Encouraged to contact writer prn.  Will continue to follow.       Electronically signed by Jennifer Pleitez RN on 2024 at 11:13 AM

## 2024-05-22 NOTE — PROGRESS NOTES
to go to [facility] [place] [facility] for second opinion due to socioeconomic issues.  We will reach out to patient primary care office to set up an appointment  Proceed with lanreotide.  Patient seen by vascular surgery for SMA stenosis recommend continued surveillance.  Stenosis is chronic in nature  NCCN guidelines were reviewed and discussed with the patient.  The diagnosis and care plan were discussed with the patient in detail. I discussed the natural history of the disease, prognosis, risks and goals of therapy and answered all the patients questions to the best of my ability.  Patient expressed understanding and was in agreement.    ADAMA SCHWAB MD        I spent a total of 35 minutes on the date of the service which included preparing to see the patient, face-to-face patient care, completing clinical documentation, obtaining and/or reviewing separately obtained history, performing a medically appropriate examination, counseling and educating the patient/family/caregiver, ordering medications, tests, or procedures, communicating with other HCPs (not separately reported), independently interpreting results (not separately reported), communicating results to the patient/family/caregiver and care coordination (not separately reported).      This note is created with the assistance of a speech recognition program.  While intending to generate a document that actually reflects the content of the visit, the document can still have some errors including those of syntax and sound a like substitutions which may escape proof reading.  It such instances, actual meaning can be extrapolated by contextual diversion.

## 2024-06-10 ENCOUNTER — TELEPHONE (OUTPATIENT)
Dept: ONCOLOGY | Age: 54
End: 2024-06-10
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
## 2024-06-14 ENCOUNTER — TELEPHONE (OUTPATIENT)
Dept: ONCOLOGY | Age: 54
End: 2024-06-14

## 2024-06-14 NOTE — TELEPHONE ENCOUNTER
called patient who states she is waiting on call from [place]  with further resources. Patient states she has appointment 6/19 and needs ride. Patient currently staying with sister down the street ([address]).  provided member services number for insurance provider and requested patient call to update temporary address.  will schedule ride.

## 2024-06-15 DIAGNOSIS — K31.819 GAVE (GASTRIC ANTRAL VASCULAR ECTASIA): ICD-10-CM

## 2024-06-15 DIAGNOSIS — D3A.8 PRIMARY PANCREATIC NEUROENDOCRINE TUMOR: ICD-10-CM

## 2024-06-17 RX ORDER — INSULIN GLARGINE 100 [IU]/ML
INJECTION, SOLUTION SUBCUTANEOUS
Qty: 15 ML | Refills: 1 | Status: SHIPPED | OUTPATIENT
Start: 2024-06-17

## 2024-06-21 ENCOUNTER — TELEPHONE (OUTPATIENT)
Dept: ONCOLOGY | Age: 54
End: 2024-06-21

## 2024-06-21 NOTE — TELEPHONE ENCOUNTER
Patient called in and left message that she needs to reschedule missed injection from 6/19/24.  Between triage RNs and myself, we have called patient unsuccessfully multiple times. We are unable to leave message as patient's voice mail is not set up.

## 2024-06-28 ENCOUNTER — HOSPITAL ENCOUNTER (OUTPATIENT)
Dept: INFUSION THERAPY | Age: 54
Discharge: HOME | End: 2024-06-28

## 2024-07-02 ENCOUNTER — TELEPHONE (OUTPATIENT)
Dept: ONCOLOGY | Age: 54
End: 2024-07-02

## 2024-07-02 NOTE — TELEPHONE ENCOUNTER
received message from Medical Oncology stating patient had called in requesting call from him.  called number left by patient ([phone]) which is not an active number.  called listed number in chart but unable to leave message.

## 2024-07-02 NOTE — TELEPHONE ENCOUNTER
Patient called and left voice mail asking for Sj , to call patient back. Her number she left was [phone].

## 2024-07-05 ENCOUNTER — HOSPITAL ENCOUNTER (OUTPATIENT)
Dept: INFUSION THERAPY | Age: 54
Discharge: HOME | End: 2024-07-05
Payer: MEDICARE

## 2024-07-05 DIAGNOSIS — D3A.8 PRIMARY PANCREATIC NEUROENDOCRINE TUMOR: Primary | ICD-10-CM

## 2024-07-05 PROCEDURE — 96372 THER/PROPH/DIAG INJ SC/IM: CPT

## 2024-07-05 PROCEDURE — 6360000002 HC RX W HCPCS: Performed by: INTERNAL MEDICINE

## 2024-07-05 RX ORDER — LANREOTIDE ACETATE 120 MG/.5ML
120 INJECTION SUBCUTANEOUS ONCE
Status: COMPLETED | OUTPATIENT
Start: 2024-07-05 | End: 2024-07-05

## 2024-07-05 RX ORDER — SODIUM CHLORIDE 9 MG/ML
INJECTION, SOLUTION INTRAVENOUS CONTINUOUS
OUTPATIENT
Start: 2024-07-19

## 2024-07-05 RX ORDER — ALBUTEROL SULFATE 90 UG/1
4 AEROSOL, METERED RESPIRATORY (INHALATION) PRN
OUTPATIENT
Start: 2024-07-19

## 2024-07-05 RX ORDER — EPINEPHRINE 1 MG/ML
0.3 INJECTION, SOLUTION, CONCENTRATE INTRAVENOUS PRN
OUTPATIENT
Start: 2024-07-19

## 2024-07-05 RX ORDER — ONDANSETRON 2 MG/ML
8 INJECTION INTRAMUSCULAR; INTRAVENOUS
OUTPATIENT
Start: 2024-07-19

## 2024-07-05 RX ORDER — LANREOTIDE ACETATE 120 MG/.5ML
120 INJECTION SUBCUTANEOUS ONCE
OUTPATIENT
Start: 2024-07-19 | End: 2024-07-19

## 2024-07-05 RX ORDER — DIPHENHYDRAMINE HYDROCHLORIDE 50 MG/ML
50 INJECTION INTRAMUSCULAR; INTRAVENOUS
OUTPATIENT
Start: 2024-07-19

## 2024-07-05 RX ORDER — FAMOTIDINE 10 MG/ML
20 INJECTION, SOLUTION INTRAVENOUS
OUTPATIENT
Start: 2024-07-19

## 2024-07-05 RX ORDER — ACETAMINOPHEN 325 MG/1
650 TABLET ORAL
OUTPATIENT
Start: 2024-07-19

## 2024-07-05 RX ADMIN — LANREOTIDE ACETATE 120 MG: 120 INJECTION SUBCUTANEOUS at 12:13

## 2024-07-05 NOTE — PROGRESS NOTES
Pt here for Lanreotide injection.   Injection given without incident.  Pt discharged in stable condition.   Returns 7-24-24 for MD f/u and next injection.

## 2024-07-22 ENCOUNTER — TELEPHONE (OUTPATIENT)
Dept: ONCOLOGY | Age: 54
End: 2024-07-22

## 2024-07-22 NOTE — TELEPHONE ENCOUNTER
Patient called stating she needs ride to appointment 7/24 because she is out of rides with insurance provider.  set up ride with [employer] for 8:30am.

## 2024-07-24 ENCOUNTER — TELEPHONE (OUTPATIENT)
Dept: ONCOLOGY | Age: 54
End: 2024-07-24

## 2024-07-24 ENCOUNTER — OFFICE VISIT (OUTPATIENT)
Dept: ONCOLOGY | Age: 54
End: 2024-07-24
Payer: MEDICARE

## 2024-07-24 ENCOUNTER — HOSPITAL ENCOUNTER (OUTPATIENT)
Dept: INFUSION THERAPY | Age: 54
Discharge: HOME | End: 2024-07-24
Payer: MEDICARE

## 2024-07-24 VITALS
BODY MASS INDEX: 35.55 KG/M2 | SYSTOLIC BLOOD PRESSURE: 111 MMHG | WEIGHT: 179 LBS | HEART RATE: 79 BPM | RESPIRATION RATE: 18 BRPM | OXYGEN SATURATION: 99 % | TEMPERATURE: 97.4 F | DIASTOLIC BLOOD PRESSURE: 77 MMHG

## 2024-07-24 DIAGNOSIS — Z79.899 HIGH RISK MEDICATION USE: ICD-10-CM

## 2024-07-24 DIAGNOSIS — D3A.8 PRIMARY PANCREATIC NEUROENDOCRINE TUMOR: Primary | ICD-10-CM

## 2024-07-24 DIAGNOSIS — K31.819 GAVE (GASTRIC ANTRAL VASCULAR ECTASIA): ICD-10-CM

## 2024-07-24 LAB
ALBUMIN SERPL-MCNC: 4 G/DL (ref 3.5–5.2)
ALBUMIN/GLOB SERPL: 1.3 {RATIO} (ref 1–2.5)
ALP SERPL-CCNC: 163 U/L (ref 35–104)
ALT SERPL-CCNC: 32 U/L (ref 5–33)
ANION GAP SERPL CALCULATED.3IONS-SCNC: 13 MMOL/L (ref 9–17)
AST SERPL-CCNC: 32 U/L
BASOPHILS # BLD: 0.1 K/UL (ref 0–0.2)
BASOPHILS NFR BLD: 1 % (ref 0–2)
BILIRUB SERPL-MCNC: 0.6 MG/DL (ref 0.3–1.2)
BUN SERPL-MCNC: 13 MG/DL (ref 6–20)
CALCIUM SERPL-MCNC: 9.2 MG/DL (ref 8.6–10.4)
CHLORIDE SERPL-SCNC: 104 MMOL/L (ref 98–107)
CO2 SERPL-SCNC: 21 MMOL/L (ref 20–31)
CREAT SERPL-MCNC: 0.9 MG/DL (ref 0.5–0.9)
EOSINOPHIL # BLD: 0.8 K/UL (ref 0–0.4)
EOSINOPHILS RELATIVE PERCENT: 14 % (ref 1–4)
ERYTHROCYTE [DISTWIDTH] IN BLOOD BY AUTOMATED COUNT: 13.8 % (ref 12.5–15.4)
GFR, ESTIMATED: 75 ML/MIN/1.73M2
GLUCOSE SERPL-MCNC: 236 MG/DL (ref 70–99)
HCT VFR BLD AUTO: 35.2 % (ref 36–46)
HGB BLD-MCNC: 11.8 G/DL (ref 12–16)
LYMPHOCYTES NFR BLD: 1.5 K/UL (ref 1–4.8)
LYMPHOCYTES RELATIVE PERCENT: 27 % (ref 24–44)
MCH RBC QN AUTO: 30.9 PG (ref 26–34)
MCHC RBC AUTO-ENTMCNC: 33.5 G/DL (ref 31–37)
MCV RBC AUTO: 92.2 FL (ref 80–100)
MONOCYTES NFR BLD: 0.3 K/UL (ref 0.1–1.2)
MONOCYTES NFR BLD: 5 % (ref 2–11)
NEUTROPHILS NFR BLD: 53 % (ref 36–66)
NEUTS SEG NFR BLD: 3.1 K/UL (ref 1.8–7.7)
PLATELET # BLD AUTO: 256 K/UL (ref 140–450)
PMV BLD AUTO: 6.9 FL (ref 6–12)
POTASSIUM SERPL-SCNC: 3.8 MMOL/L (ref 3.7–5.3)
PROT SERPL-MCNC: 7.1 G/DL (ref 6.4–8.3)
RBC # BLD AUTO: 3.81 M/UL (ref 4–5.2)
SODIUM SERPL-SCNC: 138 MMOL/L (ref 135–144)
WBC OTHER # BLD: 5.8 K/UL (ref 3.5–11)

## 2024-07-24 PROCEDURE — 1036F TOBACCO NON-USER: CPT | Performed by: INTERNAL MEDICINE

## 2024-07-24 PROCEDURE — 3017F COLORECTAL CA SCREEN DOC REV: CPT | Performed by: INTERNAL MEDICINE

## 2024-07-24 PROCEDURE — 36415 COLL VENOUS BLD VENIPUNCTURE: CPT

## 2024-07-24 PROCEDURE — 99211 OFF/OP EST MAY X REQ PHY/QHP: CPT | Performed by: INTERNAL MEDICINE

## 2024-07-24 PROCEDURE — G8427 DOCREV CUR MEDS BY ELIG CLIN: HCPCS | Performed by: INTERNAL MEDICINE

## 2024-07-24 PROCEDURE — G8417 CALC BMI ABV UP PARAM F/U: HCPCS | Performed by: INTERNAL MEDICINE

## 2024-07-24 PROCEDURE — 99214 OFFICE O/P EST MOD 30 MIN: CPT | Performed by: INTERNAL MEDICINE

## 2024-07-24 PROCEDURE — 80053 COMPREHEN METABOLIC PANEL: CPT

## 2024-07-24 PROCEDURE — 85025 COMPLETE CBC W/AUTO DIFF WBC: CPT

## 2024-07-24 RX ORDER — FAMOTIDINE 10 MG/ML
20 INJECTION, SOLUTION INTRAVENOUS
Status: CANCELLED | OUTPATIENT
Start: 2024-08-02

## 2024-07-24 RX ORDER — LANREOTIDE ACETATE 120 MG/.5ML
120 INJECTION SUBCUTANEOUS ONCE
Status: CANCELLED | OUTPATIENT
Start: 2024-08-02 | End: 2024-08-02

## 2024-07-24 RX ORDER — ACETAMINOPHEN 325 MG/1
650 TABLET ORAL
Status: CANCELLED | OUTPATIENT
Start: 2024-08-02

## 2024-07-24 RX ORDER — ONDANSETRON 2 MG/ML
8 INJECTION INTRAMUSCULAR; INTRAVENOUS
Status: CANCELLED | OUTPATIENT
Start: 2024-08-02

## 2024-07-24 RX ORDER — SODIUM CHLORIDE 9 MG/ML
INJECTION, SOLUTION INTRAVENOUS CONTINUOUS
Status: CANCELLED | OUTPATIENT
Start: 2024-08-02

## 2024-07-24 RX ORDER — ALBUTEROL SULFATE 90 UG/1
4 AEROSOL, METERED RESPIRATORY (INHALATION) PRN
Status: CANCELLED | OUTPATIENT
Start: 2024-08-02

## 2024-07-24 RX ORDER — DIPHENHYDRAMINE HYDROCHLORIDE 50 MG/ML
50 INJECTION INTRAMUSCULAR; INTRAVENOUS
Status: CANCELLED | OUTPATIENT
Start: 2024-08-02

## 2024-07-24 RX ORDER — EPINEPHRINE 1 MG/ML
0.3 INJECTION, SOLUTION, CONCENTRATE INTRAVENOUS PRN
Status: CANCELLED | OUTPATIENT
Start: 2024-08-02

## 2024-07-24 NOTE — TELEPHONE ENCOUNTER
Instructions   from Dr. Albaro Mahan MD    Tx as planned   Rv in 4 weeks     Patient scheduled for 4 week f/u 08/30/2024 at 9:45 am with tx to follow at 10:00 am.
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
## 2024-07-29 ENCOUNTER — TELEPHONE (OUTPATIENT)
Dept: ONCOLOGY | Age: 54
End: 2024-07-29
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
## 2024-07-30 ENCOUNTER — TELEPHONE (OUTPATIENT)
Dept: ONCOLOGY | Age: 54
End: 2024-07-30

## 2024-07-30 NOTE — TELEPHONE ENCOUNTER
reached out to [employer] to see what resources they had provided patient with and left a message.  spoke with Homelessness Board and was told to have patient call 211.  confirmed receipt of [employer] paperwork, waiting on doctor signature. Soical Worker called patient and left message with updates. Transportation scheduled for 8/2.    Transportation details:  [facility]  [phone]   ~ 7:30am  Confirmation #[id number]  Call for ride home

## 2024-07-31 ENCOUNTER — TELEPHONE (OUTPATIENT)
Dept: ONCOLOGY | Age: 54
End: 2024-07-31

## 2024-07-31 NOTE — TELEPHONE ENCOUNTER
[facility] returned call and reviewed assistance offered including temporary housing, financial assistance, mental health resources, and various referrals to other agencies. [facility] states various issues with patient returning calls and following through with referrals.  called patient again and left a message.

## 2024-08-01 ENCOUNTER — TELEPHONE (OUTPATIENT)
Dept: INFUSION THERAPY | Age: 54
End: 2024-08-01

## 2024-08-02 ENCOUNTER — HOSPITAL ENCOUNTER (OUTPATIENT)
Dept: INFUSION THERAPY | Age: 54
Discharge: HOME | End: 2024-08-02
Payer: MEDICARE

## 2024-08-02 DIAGNOSIS — D3A.8 PRIMARY PANCREATIC NEUROENDOCRINE TUMOR: Primary | ICD-10-CM

## 2024-08-02 PROCEDURE — 6360000002 HC RX W HCPCS: Performed by: INTERNAL MEDICINE

## 2024-08-02 PROCEDURE — 96372 THER/PROPH/DIAG INJ SC/IM: CPT

## 2024-08-02 RX ORDER — SODIUM CHLORIDE 9 MG/ML
INJECTION, SOLUTION INTRAVENOUS CONTINUOUS
OUTPATIENT
Start: 2024-08-30

## 2024-08-02 RX ORDER — DIPHENHYDRAMINE HYDROCHLORIDE 50 MG/ML
50 INJECTION INTRAMUSCULAR; INTRAVENOUS
OUTPATIENT
Start: 2024-08-30

## 2024-08-02 RX ORDER — LANREOTIDE ACETATE 120 MG/.5ML
120 INJECTION SUBCUTANEOUS ONCE
Status: COMPLETED | OUTPATIENT
Start: 2024-08-02 | End: 2024-08-02

## 2024-08-02 RX ORDER — ACETAMINOPHEN 325 MG/1
650 TABLET ORAL
OUTPATIENT
Start: 2024-08-30

## 2024-08-02 RX ORDER — ONDANSETRON 2 MG/ML
8 INJECTION INTRAMUSCULAR; INTRAVENOUS
OUTPATIENT
Start: 2024-08-30

## 2024-08-02 RX ORDER — ALBUTEROL SULFATE 90 UG/1
4 AEROSOL, METERED RESPIRATORY (INHALATION) PRN
OUTPATIENT
Start: 2024-08-30

## 2024-08-02 RX ORDER — FAMOTIDINE 10 MG/ML
20 INJECTION, SOLUTION INTRAVENOUS
OUTPATIENT
Start: 2024-08-30

## 2024-08-02 RX ORDER — LANREOTIDE ACETATE 120 MG/.5ML
120 INJECTION SUBCUTANEOUS ONCE
OUTPATIENT
Start: 2024-08-30 | End: 2024-08-30

## 2024-08-02 RX ORDER — EPINEPHRINE 1 MG/ML
0.3 INJECTION, SOLUTION, CONCENTRATE INTRAVENOUS PRN
OUTPATIENT
Start: 2024-08-30

## 2024-08-02 RX ADMIN — LANREOTIDE ACETATE 120 MG: 120 INJECTION SUBCUTANEOUS at 08:55

## 2024-08-02 NOTE — PROGRESS NOTES
Patient arrived for lanreotide injection. Tolerated w/o incident and left instable condition. Returns 8/30 for dr and tx.

## 2024-09-06 ENCOUNTER — TELEPHONE (OUTPATIENT)
Dept: ONCOLOGY | Age: 54
End: 2024-09-06

## 2024-09-06 NOTE — TELEPHONE ENCOUNTER
Name: Marilyn Campo  : 1970  MRN: [id number]    Oncology Navigation Follow-Up Note    Contact Type:  Telephone    Notes: Upon review of chart noted pt no show for  Dr. Mahan f/u & tx.  Noted pt rescheduled to .  Attempted to contact pt, no answer, VM left encouraged to contact writer prn.  Will continue to follow.      Electronically signed by Jennifer Pleitez RN on 2024 at 10:30 AM

## 2024-09-13 ENCOUNTER — TELEPHONE (OUTPATIENT)
Dept: ONCOLOGY | Age: 54
End: 2024-09-13

## 2024-09-20 ENCOUNTER — HOSPITAL ENCOUNTER (OUTPATIENT)
Dept: INFUSION THERAPY | Age: 54
Discharge: HOME | End: 2024-09-20

## 2024-10-07 ENCOUNTER — TELEPHONE (OUTPATIENT)
Dept: ONCOLOGY | Age: 54
End: 2024-10-07

## 2024-10-07 NOTE — TELEPHONE ENCOUNTER
received call from Medical Oncology stating voicemail left requesting transportation to upcoming appointment. Per chart review next appointment 10/25.  called insurance provider but unable to schedule at this time.  will schedule closer to appointment date.

## 2024-10-14 ENCOUNTER — TELEPHONE (OUTPATIENT)
Dept: ONCOLOGY | Age: 54
End: 2024-10-14

## 2024-10-14 NOTE — TELEPHONE ENCOUNTER
Patient left message asking that we contact  Sj for her. She stated she had called and left him a couple messages but he hadn't returned her call. I called and left a message for Sj to please call patient back at his as soon as possible along with her phone #.

## 2024-10-15 ENCOUNTER — SOCIAL WORK (OUTPATIENT)
Dept: ONCOLOGY | Age: 54
End: 2024-10-15

## 2024-10-15 NOTE — PROGRESS NOTES
called insurance on behalf of patient and set up transportation for appointment on 10/25/24 at 2:45 PM. Patient updated.      Transportation details:  [place]  [phone]   ~ 1:45 PM  Confirmation #[id number]  Call for ride home

## 2024-10-25 ENCOUNTER — OFFICE VISIT (OUTPATIENT)
Dept: ONCOLOGY | Age: 54
End: 2024-10-25
Payer: MEDICARE

## 2024-10-25 ENCOUNTER — HOSPITAL ENCOUNTER (OUTPATIENT)
Dept: INFUSION THERAPY | Age: 54
Discharge: HOME | End: 2024-10-25
Payer: MEDICARE

## 2024-10-25 VITALS
HEART RATE: 82 BPM | OXYGEN SATURATION: 99 % | DIASTOLIC BLOOD PRESSURE: 88 MMHG | WEIGHT: 200 LBS | RESPIRATION RATE: 18 BRPM | SYSTOLIC BLOOD PRESSURE: 129 MMHG | BODY MASS INDEX: 39.72 KG/M2 | TEMPERATURE: 97.3 F

## 2024-10-25 DIAGNOSIS — D3A.8 PRIMARY PANCREATIC NEUROENDOCRINE TUMOR: Primary | ICD-10-CM

## 2024-10-25 DIAGNOSIS — K31.819 GAVE (GASTRIC ANTRAL VASCULAR ECTASIA): ICD-10-CM

## 2024-10-25 DIAGNOSIS — Z79.899 HIGH RISK MEDICATION USE: ICD-10-CM

## 2024-10-25 LAB
ALBUMIN SERPL-MCNC: 4.2 G/DL (ref 3.5–5.2)
ALBUMIN/GLOB SERPL: 1.3 {RATIO} (ref 1–2.5)
ALP SERPL-CCNC: 142 U/L (ref 35–104)
ALT SERPL-CCNC: 22 U/L (ref 5–33)
ANION GAP SERPL CALCULATED.3IONS-SCNC: 9 MMOL/L (ref 9–17)
AST SERPL-CCNC: 32 U/L
BASOPHILS # BLD: 0.1 K/UL (ref 0–0.2)
BASOPHILS NFR BLD: 1 % (ref 0–2)
BILIRUB SERPL-MCNC: 0.3 MG/DL (ref 0.3–1.2)
BUN SERPL-MCNC: 18 MG/DL (ref 6–20)
CALCIUM SERPL-MCNC: 8.9 MG/DL (ref 8.6–10.4)
CHLORIDE SERPL-SCNC: 108 MMOL/L (ref 98–107)
CO2 SERPL-SCNC: 25 MMOL/L (ref 20–31)
CREAT SERPL-MCNC: 0.9 MG/DL (ref 0.5–0.9)
EOSINOPHIL # BLD: 0.6 K/UL (ref 0–0.4)
EOSINOPHILS RELATIVE PERCENT: 12 % (ref 1–4)
ERYTHROCYTE [DISTWIDTH] IN BLOOD BY AUTOMATED COUNT: 13.2 % (ref 12.5–15.4)
GFR, ESTIMATED: 75 ML/MIN/1.73M2
GLUCOSE SERPL-MCNC: 107 MG/DL (ref 70–99)
HCT VFR BLD AUTO: 36.1 % (ref 36–46)
HGB BLD-MCNC: 12.4 G/DL (ref 12–16)
LYMPHOCYTES NFR BLD: 1.3 K/UL (ref 1–4.8)
LYMPHOCYTES RELATIVE PERCENT: 26 % (ref 24–44)
MCH RBC QN AUTO: 31.9 PG (ref 26–34)
MCHC RBC AUTO-ENTMCNC: 34.4 G/DL (ref 31–37)
MCV RBC AUTO: 92.8 FL (ref 80–100)
MONOCYTES NFR BLD: 0.3 K/UL (ref 0.1–1.2)
MONOCYTES NFR BLD: 6 % (ref 2–11)
NEUTROPHILS NFR BLD: 55 % (ref 36–66)
NEUTS SEG NFR BLD: 2.8 K/UL (ref 1.8–7.7)
PLATELET # BLD AUTO: 238 K/UL (ref 140–450)
PMV BLD AUTO: 7.1 FL (ref 6–12)
POTASSIUM SERPL-SCNC: 3.9 MMOL/L (ref 3.7–5.3)
PROT SERPL-MCNC: 7.5 G/DL (ref 6.4–8.3)
RBC # BLD AUTO: 3.89 M/UL (ref 4–5.2)
SODIUM SERPL-SCNC: 142 MMOL/L (ref 135–144)
WBC OTHER # BLD: 5.2 K/UL (ref 3.5–11)

## 2024-10-25 PROCEDURE — 1036F TOBACCO NON-USER: CPT | Performed by: INTERNAL MEDICINE

## 2024-10-25 PROCEDURE — 85025 COMPLETE CBC W/AUTO DIFF WBC: CPT

## 2024-10-25 PROCEDURE — 6360000002 HC RX W HCPCS: Performed by: INTERNAL MEDICINE

## 2024-10-25 PROCEDURE — 36415 COLL VENOUS BLD VENIPUNCTURE: CPT

## 2024-10-25 PROCEDURE — G8484 FLU IMMUNIZE NO ADMIN: HCPCS | Performed by: INTERNAL MEDICINE

## 2024-10-25 PROCEDURE — 80053 COMPREHEN METABOLIC PANEL: CPT

## 2024-10-25 PROCEDURE — G8417 CALC BMI ABV UP PARAM F/U: HCPCS | Performed by: INTERNAL MEDICINE

## 2024-10-25 PROCEDURE — 99211 OFF/OP EST MAY X REQ PHY/QHP: CPT | Performed by: INTERNAL MEDICINE

## 2024-10-25 PROCEDURE — 3017F COLORECTAL CA SCREEN DOC REV: CPT | Performed by: INTERNAL MEDICINE

## 2024-10-25 PROCEDURE — 99214 OFFICE O/P EST MOD 30 MIN: CPT | Performed by: INTERNAL MEDICINE

## 2024-10-25 PROCEDURE — G8428 CUR MEDS NOT DOCUMENT: HCPCS | Performed by: INTERNAL MEDICINE

## 2024-10-25 PROCEDURE — 96372 THER/PROPH/DIAG INJ SC/IM: CPT

## 2024-10-25 RX ORDER — LANREOTIDE ACETATE 120 MG/.5ML
120 INJECTION SUBCUTANEOUS ONCE
Status: COMPLETED | OUTPATIENT
Start: 2024-10-25 | End: 2024-10-25

## 2024-10-25 RX ADMIN — LANREOTIDE ACETATE 120 MG: 120 INJECTION SUBCUTANEOUS at 16:44

## 2024-10-25 NOTE — PROGRESS NOTES
Patient arrived for lanreotide injection.  Arrives ambulatory.  Denies any complaints.  Patient was seen by Dr. Mahan, order received to proceed with treatment.  Injection tolerated without incident.  Discharged in stable condition.  Returns 11/22/2024 for office visit and injection.

## 2024-10-25 NOTE — PROGRESS NOTES
Marilyn Campo                                                                                                                  10/25/2024  MRN:   [id number]  YOB: 1970  PCP:                           Mauri Harmon MD  Referring Physician: Eden  Treating Physician Name: ADAMA SCHWAB MD      Reason for visit:  Chief Complaint   Patient presents with    Follow-up     Review status of disease     Other     Recent fall   Reviewed labs    Current problems:  Well-differentiated, grade 1, pancreatic neuroendocrine tumor, clinical stage T2 N0 M0  Elevated gastrin (patient on Protonix)  SMA focal severe stenosis, chronic per CT scan  Multiple comorbidities including diabetes mellitus obesity, renal insufficiency and severe hepatic steatosis  Family history of leukemia     Active and recent treatments:  Patient medically unfit for surgery  Lanreotide-11/2022    Interim History:  Patient presents to the clinic for a follow-up visit and for toxicity check and to review results of her blood work-up.  Patient has been tolerating treatment without any unexpected or severe side effects.  Denies hospitalization or ER visit.  Appetite is good.  Weight is stable.  Nausea is controlled.  Patient mother passed away earlier.  Patient under a lot of emotional stress  During this visit patient's allergy, social, medical, surgical history and medications were reviewed and updated.    Summary of Case/History:    Marilyn Campo a 54 y.o.female is a patient who presents to the clinic to establish care and for further work-up and evaluation.  Patient has multiple comorbidities including diabetes COPD hypertension morbid obesity.  Patient initially presented to the emergency department with complaints of right lower quadrant pain radiating to the back.  Work-up revealed acute kidney injury pyelonephritis.  Patient CT scan without contrast also showed a possible solid-appearing lesion in the pancreatic uncinate process

## 2024-10-31 ENCOUNTER — HOSPITAL ENCOUNTER (OUTPATIENT)
Dept: CT IMAGING | Age: 54
Discharge: HOME | End: 2024-11-02
Attending: INTERNAL MEDICINE
Payer: MEDICARE

## 2024-10-31 DIAGNOSIS — K31.819 GAVE (GASTRIC ANTRAL VASCULAR ECTASIA): ICD-10-CM

## 2024-10-31 DIAGNOSIS — Z79.899 HIGH RISK MEDICATION USE: ICD-10-CM

## 2024-10-31 DIAGNOSIS — D3A.8 PRIMARY PANCREATIC NEUROENDOCRINE TUMOR: ICD-10-CM

## 2024-10-31 PROCEDURE — 71260 CT THORAX DX C+: CPT

## 2024-10-31 PROCEDURE — 6360000004 HC RX CONTRAST MEDICATION: Performed by: INTERNAL MEDICINE

## 2024-10-31 RX ORDER — IOPAMIDOL 755 MG/ML
100 INJECTION, SOLUTION INTRAVASCULAR
Status: COMPLETED | OUTPATIENT
Start: 2024-10-31 | End: 2024-10-31

## 2024-10-31 RX ADMIN — IOPAMIDOL 100 ML: 755 INJECTION, SOLUTION INTRAVENOUS at 12:12

## 2024-11-09 ENCOUNTER — HOSPITAL ENCOUNTER (OUTPATIENT)
Age: 54
Discharge: HOME | End: 2024-11-09
Payer: MEDICARE

## 2024-11-09 LAB
T3 SERPL-MCNC: 101 NG/DL (ref 80–200)
T4 SERPL-MCNC: 7.1 UG/DL (ref 4.5–11.7)
TSH SERPL DL<=0.05 MIU/L-ACNC: 2.04 UIU/ML (ref 0.27–4.2)

## 2024-11-09 PROCEDURE — 84480 ASSAY TRIIODOTHYRONINE (T3): CPT

## 2024-11-09 PROCEDURE — 36415 COLL VENOUS BLD VENIPUNCTURE: CPT

## 2024-11-09 PROCEDURE — 84436 ASSAY OF TOTAL THYROXINE: CPT

## 2024-11-09 PROCEDURE — 84443 ASSAY THYROID STIM HORMONE: CPT

## 2024-11-11 NOTE — PROGRESS NOTES
Discussed with patient at time of visit Codeine and Percocet [oxycodone-acetaminophen]    ROS:  Constitutional: Negative for fever and chills. HENT: Negative for congestion, nose bleeds, and sore throat. Eyes: Negative for blurred vision and double vision. Respiratory: Negative for cough, shortness of breath. Cardiovascular: Negative for chest pain and palpitations. Gastrointestinal: Negative for nausea, vomiting. Musculoskeletal: Positive for bilateral knee pain  Skin: Negative for itching and rash. Neurological: Negativem numbness, tingling, weakness. Psychiatric/Behavioral: Negative for depression and suicidal ideas. OBJECTIVE:  Resp 16   Ht 4' 11\" (1.499 m)   Wt 223 lb (101.2 kg)   BMI 45.04 kg/m²   Physical exam:  Gen: Alert and oriented, NAD. CV: No dependent edema, regular rate. Resp: Unlabored respirations, equal chest rise bilaterally. Neuro: No dizziness or confusion. Eyes: Extra-ocular muscles intact. Mouth: No visible brady oral lesions. Skin: Warm, well perfused. Psych: Patient has good fund of knowledge and displays understanging of exam.  Ortho exam:  R knee: Varus alignment. Antalgic gait. TTP medial joint line and about patella. No flexion contracture. AROM 0-100. Stable in varus/valgus stress at 0 and 30 degrees. Neg ant/post drawers. NVI distally. L knee:  Varus alignment. Antalgic gait. TTP medial joint line and about patella. No flexion contracture. AROM 0-100. Stable in varus/valgus stress at 0 and 30 degrees. Neg ant/post drawers. NVI distally.      RADIOLOGY:   History: 47y.o. year old female with a history of bilateral knee pain    Comparison: none    Findings: 4 views ap, lateral, sunrise, tunnel of the right knee demonstrates severe joint space narrowing, sclerosis, cystic changes and osteophyes mainly to medial compartment and patellofemoral joint    Impression: Severe right knee osteoarthritis    History: 47y.o. year old female with a history of bilateral knee pain    Comparison: none    Findings: 4 views ap, lateral, sunrise, tunnel of the left knee demonstrates severe joint space narrowing, sclerosis, cystic changes and osteophyes mainly to medial compartment and patellofemoral joint    Impression: Severe right knee osteoarthritis    ASSESSMENT: 52F with bilateral knee OA    PLAN:  Gertrudis Talamantes is here for above. Pt here to establish care. Reviewed imaging, natural hx, treatment options. Pt not medically ready for knee replacement. Pt does not want surgery at this time, anyways. We discussed weight loss, NSAIDs, home exercise program, and CSI. Pt agrees with treatment today. F/u 3 months prn for repeat CSI. -No follow-ups on file. Injection procedure note  The alternatives, benefits, and risks were discussed with the patient. After answering all questions to the patient's satisfaction, the patient agreed to proceed forward with injection and gave verbal consent for the procedure. With the patient's permission, appropriate anatomic landmarks were identified and the bilateral knee joint was prepped in a sterile fashion using alcohol and/or betadine. A 22 gauge needle was then used to inject 2cc 0.25% marcaine plain and 80mg depo medrol into the joint. The injection was advanced without resistance confirming appropriate position. The patient tolerated the procedure well and the site was dressed with a band-aid. Patient was advised to ice the area for 15-20 minutes to relieve any injection site related pain. Patient was advised to contact nurse if area becomes swollen, hot, erythematous, or painful, or to go to the emergency room after business hours.     Orders Placed This Encounter   Medications    meloxicam (MOBIC) 15 MG tablet     Sig: Take 1 tablet by mouth daily     Dispense:  30 tablet     Refill:  3      Orders Placed This Encounter   Procedures   [facility] Physical [facility]     Referral Priority:   Routine     Referral Type:   Eval and Treat     Referral Reason:   Specialty Services Required     Requested Specialty:   Physical Therapist     Number of Visits Requested:   1         Electronically signed by Stephanie Ibanez DO on 7/13/2022 at 1:12 PM    This dictation was generated by voice recognition computer software. Although all attempts are made to edit the dictation for accuracy, there may be errors in the transcription that are not intended. The actual meaning should be extrapolated by the context of the sentence.

## 2024-11-18 ENCOUNTER — SOCIAL WORK (OUTPATIENT)
Dept: ONCOLOGY | Age: 54
End: 2024-11-18

## 2024-11-18 NOTE — PROGRESS NOTES
called insurance on behalf of patient and set up transportation for appointment on 11/22/24 at 11:15 am. Patient updated.      Transportation details:  [place]  [phone]   ~ 10:45 am  Confirmation #[id number]  Call for ride home

## 2024-11-20 ENCOUNTER — TELEPHONE (OUTPATIENT)
Dept: ONCOLOGY | Age: 54
End: 2024-11-20

## 2024-11-22 ENCOUNTER — HOSPITAL ENCOUNTER (OUTPATIENT)
Dept: INFUSION THERAPY | Age: 54
Discharge: HOME | End: 2024-11-22
Payer: MEDICARE

## 2024-11-22 ENCOUNTER — TELEPHONE (OUTPATIENT)
Dept: ONCOLOGY | Age: 54
End: 2024-11-22

## 2024-11-22 ENCOUNTER — OFFICE VISIT (OUTPATIENT)
Dept: ONCOLOGY | Age: 54
End: 2024-11-22
Payer: MEDICARE

## 2024-11-22 ENCOUNTER — HOSPITAL ENCOUNTER (OUTPATIENT)
Dept: RADIATION ONCOLOGY | Age: 54
Discharge: HOME | End: 2024-11-22
Payer: MEDICARE

## 2024-11-22 VITALS
SYSTOLIC BLOOD PRESSURE: 125 MMHG | OXYGEN SATURATION: 98 % | RESPIRATION RATE: 18 BRPM | HEART RATE: 63 BPM | WEIGHT: 198.9 LBS | BODY MASS INDEX: 39.5 KG/M2 | DIASTOLIC BLOOD PRESSURE: 83 MMHG | TEMPERATURE: 96.8 F

## 2024-11-22 VITALS
TEMPERATURE: 96.8 F | HEART RATE: 63 BPM | DIASTOLIC BLOOD PRESSURE: 83 MMHG | BODY MASS INDEX: 39.5 KG/M2 | WEIGHT: 198.9 LBS | RESPIRATION RATE: 18 BRPM | SYSTOLIC BLOOD PRESSURE: 125 MMHG | OXYGEN SATURATION: 98 %

## 2024-11-22 DIAGNOSIS — Z79.899 HIGH RISK MEDICATION USE: ICD-10-CM

## 2024-11-22 DIAGNOSIS — D3A.8 PRIMARY PANCREATIC NEUROENDOCRINE TUMOR: Primary | ICD-10-CM

## 2024-11-22 DIAGNOSIS — K31.819 GAVE (GASTRIC ANTRAL VASCULAR ECTASIA): ICD-10-CM

## 2024-11-22 DIAGNOSIS — D3A.8 PRIMARY PANCREATIC NEUROENDOCRINE TUMOR: ICD-10-CM

## 2024-11-22 DIAGNOSIS — D3A.8 NEUROENDOCRINE TUMOR: Primary | ICD-10-CM

## 2024-11-22 LAB
ALBUMIN SERPL-MCNC: 4.3 G/DL (ref 3.5–5.2)
ALBUMIN/GLOB SERPL: 1.3 {RATIO} (ref 1–2.5)
ALP SERPL-CCNC: 124 U/L (ref 35–104)
ALT SERPL-CCNC: 21 U/L (ref 5–33)
ANION GAP SERPL CALCULATED.3IONS-SCNC: 11 MMOL/L (ref 9–17)
AST SERPL-CCNC: 24 U/L
BASOPHILS # BLD: 0 K/UL (ref 0–0.2)
BASOPHILS NFR BLD: 1 % (ref 0–2)
BILIRUB SERPL-MCNC: 0.4 MG/DL (ref 0.3–1.2)
BUN SERPL-MCNC: 15 MG/DL (ref 6–20)
CALCIUM SERPL-MCNC: 9.1 MG/DL (ref 8.6–10.4)
CHLORIDE SERPL-SCNC: 103 MMOL/L (ref 98–107)
CO2 SERPL-SCNC: 27 MMOL/L (ref 20–31)
CREAT SERPL-MCNC: 0.8 MG/DL (ref 0.5–0.9)
EOSINOPHIL # BLD: 0.4 K/UL (ref 0–0.4)
EOSINOPHILS RELATIVE PERCENT: 9 % (ref 1–4)
ERYTHROCYTE [DISTWIDTH] IN BLOOD BY AUTOMATED COUNT: 13.1 % (ref 12.5–15.4)
GFR, ESTIMATED: 87 ML/MIN/1.73M2
GLUCOSE SERPL-MCNC: 77 MG/DL (ref 70–99)
HCT VFR BLD AUTO: 35.7 % (ref 36–46)
HGB BLD-MCNC: 12.1 G/DL (ref 12–16)
LYMPHOCYTES NFR BLD: 1.2 K/UL (ref 1–4.8)
LYMPHOCYTES RELATIVE PERCENT: 26 % (ref 24–44)
MCH RBC QN AUTO: 31.3 PG (ref 26–34)
MCHC RBC AUTO-ENTMCNC: 34 G/DL (ref 31–37)
MCV RBC AUTO: 92 FL (ref 80–100)
MONOCYTES NFR BLD: 0.3 K/UL (ref 0.1–1.2)
MONOCYTES NFR BLD: 6 % (ref 2–11)
NEUTROPHILS NFR BLD: 58 % (ref 36–66)
NEUTS SEG NFR BLD: 2.7 K/UL (ref 1.8–7.7)
PLATELET # BLD AUTO: 261 K/UL (ref 140–450)
PMV BLD AUTO: 6.9 FL (ref 6–12)
POTASSIUM SERPL-SCNC: 3.8 MMOL/L (ref 3.7–5.3)
PROT SERPL-MCNC: 7.5 G/DL (ref 6.4–8.3)
RBC # BLD AUTO: 3.88 M/UL (ref 4–5.2)
SODIUM SERPL-SCNC: 141 MMOL/L (ref 135–144)
WBC OTHER # BLD: 4.6 K/UL (ref 3.5–11)

## 2024-11-22 PROCEDURE — G8427 DOCREV CUR MEDS BY ELIG CLIN: HCPCS | Performed by: INTERNAL MEDICINE

## 2024-11-22 PROCEDURE — 96372 THER/PROPH/DIAG INJ SC/IM: CPT

## 2024-11-22 PROCEDURE — 99214 OFFICE O/P EST MOD 30 MIN: CPT | Performed by: INTERNAL MEDICINE

## 2024-11-22 PROCEDURE — 36415 COLL VENOUS BLD VENIPUNCTURE: CPT

## 2024-11-22 PROCEDURE — G8417 CALC BMI ABV UP PARAM F/U: HCPCS | Performed by: INTERNAL MEDICINE

## 2024-11-22 PROCEDURE — 80053 COMPREHEN METABOLIC PANEL: CPT

## 2024-11-22 PROCEDURE — 85025 COMPLETE CBC W/AUTO DIFF WBC: CPT

## 2024-11-22 PROCEDURE — 1036F TOBACCO NON-USER: CPT | Performed by: INTERNAL MEDICINE

## 2024-11-22 PROCEDURE — 3017F COLORECTAL CA SCREEN DOC REV: CPT | Performed by: INTERNAL MEDICINE

## 2024-11-22 PROCEDURE — 6360000002 HC RX W HCPCS: Performed by: INTERNAL MEDICINE

## 2024-11-22 PROCEDURE — 99211 OFF/OP EST MAY X REQ PHY/QHP: CPT | Performed by: INTERNAL MEDICINE

## 2024-11-22 PROCEDURE — 99213 OFFICE O/P EST LOW 20 MIN: CPT | Performed by: RADIOLOGY

## 2024-11-22 PROCEDURE — G8484 FLU IMMUNIZE NO ADMIN: HCPCS | Performed by: INTERNAL MEDICINE

## 2024-11-22 RX ORDER — LANREOTIDE ACETATE 120 MG/.5ML
120 INJECTION SUBCUTANEOUS ONCE
Status: COMPLETED | OUTPATIENT
Start: 2024-11-22 | End: 2024-11-22

## 2024-11-22 RX ADMIN — LANREOTIDE ACETATE 120 MG: 120 INJECTION SUBCUTANEOUS at 13:07

## 2024-11-22 ASSESSMENT — PATIENT HEALTH QUESTIONNAIRE - PHQ9
SUM OF ALL RESPONSES TO PHQ QUESTIONS 1-9: 0
SUM OF ALL RESPONSES TO PHQ9 QUESTIONS 1 & 2: 0
2. FEELING DOWN, DEPRESSED OR HOPELESS: NOT AT ALL
SUM OF ALL RESPONSES TO PHQ QUESTIONS 1-9: 0
1. LITTLE INTEREST OR PLEASURE IN DOING THINGS: NOT AT ALL

## 2024-11-22 NOTE — TELEPHONE ENCOUNTER
Instructions   from Dr. Albaro Mahan MD    Refer to radiation oncology   Rv in 4 weeks     Referred pt to radiation onc; informed them if they dont contact her within 1-2 business weeks, to contact them  Rv scheduled on 1/15/2025 @12:45; gave pt AVS

## 2024-11-22 NOTE — PROGRESS NOTES
Patient arrived for somatuline. Seen by vivian Rodriguez to treat. Tolerated w/o incident and left in stable condition Returns 12/20

## 2024-11-22 NOTE — CONSULTS
[facility]            Radiation Oncology          [address] [place] [address]        O: [phone]        F: [phone]       [facility]             2024    Patient: Marilyn Campo   YOB: 1970       Dear Dr Mahan:    Thank you for referring Marilyn Campo to me for evaluation. Below are the relevant portions of my assessment and plan of care. If you have questions, please do not hesitate to call me. I look forward to following this patient along with you.     Sincerely,    Electronically signed by Lencho Butterfield MD on 2024 at 3:06 PM    CC: Patient Care Team:  Mauri Harmon MD as PCP - General (Internal Medicine)  Jennifer Pleitez RN as Nurse Navigator (Oncology)  Kristina Mayo MD as Consulting Physician (Surgical Oncology)  ------------------------------------------------------------------------------------------------------------------------------------------------------------------------------------------      RADIATION ONCOLOGY NEW PATIENT VISIT:    Date of Service: 2024    Location:  [facility] Radiation Oncology,   [address] [place] [address], [place] [id number] [phone]     Patient ID:   Marilyn Campo  : 1970   MRN: [id number]    CHIEF COMPLAINT: \"I am here to discuss radiation\"    DIAGNOSIS:  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 female with a known history of well-differentiated neuroendocrine pancreas, disease is localized to the pancreatic head without evidence of metastatic disease on imaging.  Patient is not a surgical candidate due to comorbidities.  She has been on lanreotide, however recent imaging including CT chest abdomen pelvis shows increase in the pancreatic head mass measuring currently 3.5 cm in the greatest dimension without evidence of other sites of metastatic disease.  Given

## 2024-11-22 NOTE — TELEPHONE ENCOUNTER
Name: Marilyn Campo  : 1970  MRN: [id number]    Oncology Navigation Follow-Up Note    Contact Type:  Medical Oncology    Notes: [facility] for Dr. Mahan f/u & tx.  Met w/pt & pt's son prior to appt.  Pt stated living w/sister.  Pt denied questions/concerns.  Encouraged to contact writer prn.  Will continue to follow.      Electronically signed by Jennifer Pleitez RN on 2024 at 12:22 PM

## 2024-11-22 NOTE — PROGRESS NOTES
Referring Physician:  Dr. Mahan      Vitals:    11/22/24 1330   BP: 125/83   Pulse: 63   Resp: 18   Temp: 96.8 °F (36 °C)   SpO2: 98%    :                Wt Readings from Last 1 Encounters:   11/22/24 90.2 kg (198 lb 14.4 oz)        Body mass index is 39.5 kg/m².              Smoking Status:    [] Smoker - PPD:   [] Nonsmoker - Quit Date:               [] Never a smoker      No chief complaint on file.       Cancer Staging   No matching staging information was found for the patient.      Prior Radiation Therapy? No   If yes, site treated:   Facility:                             Date:    Concurrent Chemo/radiation? Yes and No   If yes, start date:       Pacemaker/Defibrillator/ICD:  No    Do you have any musculoskeletal diseases, Lupus or arthritic conditions?  No   If yes, are you currently taking Methotrexate?  []  Yes  [x]  No                   Current Outpatient Medications   Medication Sig Dispense Refill    LANTUS SOLOSTAR 100 UNIT/ML injection pen INJECT 20 UNITS UNDER THE SKIN DAILY (Patient not taking: Reported on 7/24/2024) 15 mL 1    pantoprazole (PROTONIX) 40 MG tablet Take 2 tablets by mouth 2 times daily (before meals) 60 tablet 5    glimepiride (AMARYL) 4 MG tablet       loratadine (CLARITIN) 10 MG tablet Take 1 tablet by mouth daily      oxybutynin (DITROPAN) 5 MG tablet Take 1 tablet by mouth 2 times daily      glipiZIDE (GLUCOTROL) 5 MG tablet Take 1 tablet by mouth 2 times daily (before meals) 60 tablet 0    DULoxetine (CYMBALTA) 30 MG extended release capsule Take 1 capsule by mouth daily      aspirin 81 MG chewable tablet Take 1 tablet by mouth daily      lisinopril-hydroCHLOROthiazide (PRINZIDE;ZESTORETIC) 20-12.5 MG per tablet take 1 tablet by mouth once daily      levothyroxine (SYNTHROID) 100 MCG tablet Take 1 tablet by mouth Daily      simvastatin (ZOCOR) 40 MG tablet Take 1 tablet by mouth nightly      acetaminophen (TYLENOL) 325 MG tablet Take 2 tablets by mouth every 6 hours as

## 2024-11-22 NOTE — ACP (ADVANCE CARE PLANNING)
Advance Care Planning     Hospice Services: Patient is not currently receiving hospice services/has not received hospice care within the performance year.    Advance Care Planning was discussed with patient, and she does not have ACP documents.  Decision Maker listed in chart.

## 2024-11-24 PROBLEM — C25.4 MALIGNANT NEOPLASM OF ENDOCRINE PANCREAS (HCC): Status: ACTIVE | Noted: 2024-11-24

## 2024-11-26 ENCOUNTER — SOCIAL WORK (OUTPATIENT)
Dept: ONCOLOGY | Age: 54
End: 2024-11-26

## 2024-11-26 NOTE — PROGRESS NOTES
called insurance on behalf of patient and set up transportation for appointment on 11/29/24 at 11:00 am. Patient updated.      Transportation details:  [facility]  [phone]   ~ 10:30 am  Confirmation #[id number]  Call for ride home

## 2024-11-29 ENCOUNTER — HOSPITAL ENCOUNTER (OUTPATIENT)
Dept: NUCLEAR MEDICINE | Age: 54
Discharge: HOME | End: 2024-12-01
Attending: RADIOLOGY
Payer: MEDICARE

## 2024-11-29 DIAGNOSIS — D3A.8 PRIMARY PANCREATIC NEUROENDOCRINE TUMOR: ICD-10-CM

## 2024-11-29 PROCEDURE — A9592 HC RX DIAGNOSTIC RADIOPHARMACEUTICAL: HCPCS | Performed by: RADIOLOGY

## 2024-11-29 PROCEDURE — 3430000000 HC RX DIAGNOSTIC RADIOPHARMACEUTICAL

## 2024-11-29 PROCEDURE — A9587 GALLIUM GA-68: HCPCS

## 2024-11-29 PROCEDURE — 2580000003 HC RX 258: Performed by: RADIOLOGY

## 2024-11-29 PROCEDURE — A9587 GALLIUM GA-68: HCPCS | Performed by: RADIOLOGY

## 2024-11-29 PROCEDURE — 3430000000 HC RX DIAGNOSTIC RADIOPHARMACEUTICAL: Performed by: RADIOLOGY

## 2024-11-29 PROCEDURE — 78815 PET IMAGE W/CT SKULL-THIGH: CPT

## 2024-11-29 RX ORDER — SODIUM CHLORIDE 0.9 % (FLUSH) 0.9 %
10 SYRINGE (ML) INJECTION PRN
Status: DISCONTINUED | OUTPATIENT
Start: 2024-11-29 | End: 2024-12-02 | Stop reason: HOSPADM

## 2024-11-29 RX ADMIN — 68GA-DOTATATE 6 MILLICURIE: KIT INTRAVENOUS at 11:35

## 2024-11-29 RX ADMIN — SODIUM CHLORIDE, PRESERVATIVE FREE 10 ML: 5 INJECTION INTRAVENOUS at 11:47

## 2024-12-04 ENCOUNTER — TELEPHONE (OUTPATIENT)
Dept: ONCOLOGY | Age: 54
End: 2024-12-04

## 2024-12-04 NOTE — TELEPHONE ENCOUNTER
Patient called stating need for ride on 12/6.  set up ride through insurance provider.      Transportation details:  [place]  [phone]   ~ 11:45 am  Confirmation #[id number]  Call for ride home

## 2024-12-06 ENCOUNTER — HOSPITAL ENCOUNTER (OUTPATIENT)
Dept: RADIATION ONCOLOGY | Age: 54
Discharge: HOME | End: 2024-12-06
Payer: MEDICARE

## 2024-12-06 ENCOUNTER — TELEPHONE (OUTPATIENT)
Dept: ONCOLOGY | Age: 54
End: 2024-12-06

## 2024-12-06 VITALS
HEART RATE: 86 BPM | SYSTOLIC BLOOD PRESSURE: 129 MMHG | TEMPERATURE: 97.7 F | RESPIRATION RATE: 16 BRPM | BODY MASS INDEX: 38.85 KG/M2 | DIASTOLIC BLOOD PRESSURE: 86 MMHG | WEIGHT: 195.6 LBS

## 2024-12-06 PROCEDURE — 99212 OFFICE O/P EST SF 10 MIN: CPT | Performed by: RADIOLOGY

## 2024-12-06 PROCEDURE — 77334 RADIATION TREATMENT AID(S): CPT | Performed by: RADIOLOGY

## 2024-12-06 NOTE — PROGRESS NOTES
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 female with a known history of well-differentiated neuroendocrine pancreas, disease is localized to the pancreatic head without evidence of metastatic disease on imaging.  Patient is not a surgical candidate due to comorbidities.  She has been on lanreotide, however recent imaging including CT chest abdomen pelvis shows increase in the pancreatic head mass measuring currently 3.5 cm in the greatest dimension without evidence of other sites of metastatic disease.  Given the enlargement of the mass, and the patient not being a surgical candidate, she is referred for consideration of radiation therapy.     Patient had a dotatate PET scan which confirmed the enlargement of the pancreatic head mass without any evidence of distant metastatic disease.  Patient presents today to proceed with radiation therapy to her enlarging pancreatic head mass.  She has no other changes in her health.    MEDICATIONS:    Current Outpatient Medications:     LANTUS SOLOSTAR 100 UNIT/ML injection pen, INJECT 20 UNITS UNDER THE SKIN DAILY (Patient not taking: Reported on 2024), Disp: 15 mL, Rfl: 1    pantoprazole (PROTONIX) 40 MG tablet, Take 2 tablets by mouth 2 times daily (before meals), Disp: 60 tablet, Rfl: 5    glimepiride (AMARYL) 4 MG tablet, , Disp: , Rfl:     loratadine (CLARITIN) 10 MG tablet, Take 1 tablet by mouth daily, Disp: , Rfl:

## 2024-12-06 NOTE — PROGRESS NOTES
Radiation Treatment Site/Plan/Fractions:  Pancreas/15 Fractions Daily      Concurrent Chemotherapy/Immunotherapy:  None however okay for pt receive Lanreotide injections during course of radiation treatments per Dr. Butterfield      Cardiac Device:  None      Transportation Concerns:  Yes- following for this.        Nursing Referrals and Reasons:  No other additional referrals needed at this time.      Contrast Given:  Yes          Miscellaneous Information:  Site specific side effects reviewed with pt and son present.  She verbalized understanding of need for empty stomach 2 hours prior to daily radiation treatments.  Patient also aware it will be approximately 3 weeks before any updates regarding radiation treatments.

## 2024-12-06 NOTE — DISCHARGE INSTRUCTIONS
technical calculations that will be used to set up the treatment machine (linear accelerator). The dosimetrist and physicist work closely with your radiation oncologist to develop the treatment plan, a process that typically takes 7-10 business days. Once the planning is completed, a therapist will call you with a start date. During that call your appointment time will also be determined. Your appointment time will be at the same time each day.                         Head and Neck Mask                            Body Mold                        Radiation Tattoo  Daily Treatments       You will be placed on the treatment table in the same position as you were when you had your simulation scan. Once in place, a set of X-ray films will be taken to ensure that the treatment will be delivered the same way as it was simulated. Once the films and positioning are confirmed, a treatment will be delivered. Factors that affect the total length of the treatment include the complexity of your treatment and how quickly you can be positioned properly for treatment. Treatments are usually given once a day, Monday through Friday, for a number of weeks. The number of weeks is determined by national cancer guidelines and your physician. Each treatment generally takes only 10 to 15 minutes; however, you will likely be in the department for 20- 30 minutes each day.  Weekly Visits    During your treatments you will have a weekly appointment with your radiation oncologist. This appointment is to evaluate how you are tolerating your treatments and to address any questions/concerns you may have.        Follow Up  We will follow your progress and see you on a regular basis. The duration between appointments vary depending on your tolerance to your treatments and your doctor's discretion. We understand that you may be seeing many other physicians, but it is important for us to participate in this follow-up process so that any

## 2024-12-11 ENCOUNTER — HOSPITAL ENCOUNTER (OUTPATIENT)
Dept: RADIATION ONCOLOGY | Age: 54
Discharge: HOME | End: 2024-12-11
Payer: MEDICARE

## 2024-12-11 PROCEDURE — 77300 RADIATION THERAPY DOSE PLAN: CPT | Performed by: RADIOLOGY

## 2024-12-11 PROCEDURE — 77301 RADIOTHERAPY DOSE PLAN IMRT: CPT | Performed by: RADIOLOGY

## 2024-12-11 PROCEDURE — 77338 DESIGN MLC DEVICE FOR IMRT: CPT | Performed by: RADIOLOGY

## 2024-12-20 ENCOUNTER — TELEPHONE (OUTPATIENT)
Dept: ONCOLOGY | Age: 54
End: 2024-12-20

## 2024-12-20 ENCOUNTER — HOSPITAL ENCOUNTER (OUTPATIENT)
Dept: INFUSION THERAPY | Age: 54
End: 2024-12-20

## 2024-12-20 NOTE — TELEPHONE ENCOUNTER
received referral from Triage Nurse stating transportation need.  set up ride through insurance provider for 12/27.       Transportation details:  [place]  [phone]   ~ 12:30 pm  Confirmation #[id number]  Call for ride home

## 2024-12-27 ENCOUNTER — HOSPITAL ENCOUNTER (OUTPATIENT)
Dept: INFUSION THERAPY | Age: 54
Discharge: HOME | End: 2024-12-27
Payer: MEDICARE

## 2024-12-27 DIAGNOSIS — D3A.8 PRIMARY PANCREATIC NEUROENDOCRINE TUMOR: Primary | ICD-10-CM

## 2024-12-27 DIAGNOSIS — Z79.899 HIGH RISK MEDICATION USE: ICD-10-CM

## 2024-12-27 DIAGNOSIS — K31.819 GAVE (GASTRIC ANTRAL VASCULAR ECTASIA): ICD-10-CM

## 2024-12-27 DIAGNOSIS — D3A.8 PRIMARY PANCREATIC NEUROENDOCRINE TUMOR: ICD-10-CM

## 2024-12-27 LAB
ALBUMIN SERPL-MCNC: 4.4 G/DL (ref 3.5–5.2)
ALBUMIN/GLOB SERPL: 1.3 {RATIO} (ref 1–2.5)
ALP SERPL-CCNC: 135 U/L (ref 35–104)
ALT SERPL-CCNC: 33 U/L (ref 5–33)
ANION GAP SERPL CALCULATED.3IONS-SCNC: 10 MMOL/L (ref 9–17)
AST SERPL-CCNC: 33 U/L
BASOPHILS # BLD: 0.1 K/UL (ref 0–0.2)
BASOPHILS NFR BLD: 1 % (ref 0–2)
BILIRUB SERPL-MCNC: 0.3 MG/DL (ref 0.3–1.2)
BUN SERPL-MCNC: 16 MG/DL (ref 6–20)
CALCIUM SERPL-MCNC: 9.8 MG/DL (ref 8.6–10.4)
CHLORIDE SERPL-SCNC: 105 MMOL/L (ref 98–107)
CO2 SERPL-SCNC: 25 MMOL/L (ref 20–31)
CREAT SERPL-MCNC: 1 MG/DL (ref 0.5–0.9)
EOSINOPHIL # BLD: 0.7 K/UL (ref 0–0.4)
EOSINOPHILS RELATIVE PERCENT: 11 % (ref 1–4)
ERYTHROCYTE [DISTWIDTH] IN BLOOD BY AUTOMATED COUNT: 13.7 % (ref 12.5–15.4)
GFR, ESTIMATED: 67 ML/MIN/1.73M2
GLUCOSE SERPL-MCNC: 98 MG/DL (ref 70–99)
HCT VFR BLD AUTO: 37.8 % (ref 36–46)
HGB BLD-MCNC: 12.7 G/DL (ref 12–16)
LYMPHOCYTES NFR BLD: 1.7 K/UL (ref 1–4.8)
LYMPHOCYTES RELATIVE PERCENT: 29 % (ref 24–44)
MCH RBC QN AUTO: 31.1 PG (ref 26–34)
MCHC RBC AUTO-ENTMCNC: 33.7 G/DL (ref 31–37)
MCV RBC AUTO: 92.3 FL (ref 80–100)
MONOCYTES NFR BLD: 0.3 K/UL (ref 0.1–1.2)
MONOCYTES NFR BLD: 5 % (ref 2–11)
NEUTROPHILS NFR BLD: 54 % (ref 36–66)
NEUTS SEG NFR BLD: 3.3 K/UL (ref 1.8–7.7)
PLATELET # BLD AUTO: 261 K/UL (ref 140–450)
PMV BLD AUTO: 7 FL (ref 6–12)
POTASSIUM SERPL-SCNC: 4.9 MMOL/L (ref 3.7–5.3)
PROT SERPL-MCNC: 7.8 G/DL (ref 6.4–8.3)
RBC # BLD AUTO: 4.09 M/UL (ref 4–5.2)
SODIUM SERPL-SCNC: 140 MMOL/L (ref 135–144)
WBC OTHER # BLD: 6 K/UL (ref 3.5–11)

## 2024-12-27 PROCEDURE — 36415 COLL VENOUS BLD VENIPUNCTURE: CPT

## 2024-12-27 PROCEDURE — 6360000002 HC RX W HCPCS: Performed by: INTERNAL MEDICINE

## 2024-12-27 PROCEDURE — 80053 COMPREHEN METABOLIC PANEL: CPT

## 2024-12-27 PROCEDURE — 96372 THER/PROPH/DIAG INJ SC/IM: CPT

## 2024-12-27 PROCEDURE — 85025 COMPLETE CBC W/AUTO DIFF WBC: CPT

## 2024-12-27 RX ORDER — DIPHENHYDRAMINE HYDROCHLORIDE 50 MG/ML
50 INJECTION INTRAMUSCULAR; INTRAVENOUS
OUTPATIENT
Start: 2025-01-17

## 2024-12-27 RX ORDER — ALBUTEROL SULFATE 90 UG/1
4 INHALANT RESPIRATORY (INHALATION) PRN
Status: CANCELLED | OUTPATIENT
Start: 2024-12-27

## 2024-12-27 RX ORDER — LANREOTIDE ACETATE 120 MG/.5ML
120 INJECTION SUBCUTANEOUS ONCE
Status: COMPLETED | OUTPATIENT
Start: 2024-12-27 | End: 2024-12-27

## 2024-12-27 RX ORDER — ONDANSETRON 2 MG/ML
8 INJECTION INTRAMUSCULAR; INTRAVENOUS
OUTPATIENT
Start: 2025-01-17

## 2024-12-27 RX ORDER — LANREOTIDE ACETATE 120 MG/.5ML
120 INJECTION SUBCUTANEOUS ONCE
OUTPATIENT
Start: 2025-01-17 | End: 2025-01-17

## 2024-12-27 RX ORDER — FAMOTIDINE 10 MG/ML
20 INJECTION, SOLUTION INTRAVENOUS
Status: CANCELLED | OUTPATIENT
Start: 2024-12-27

## 2024-12-27 RX ORDER — HYDROCORTISONE SODIUM SUCCINATE 100 MG/2ML
100 INJECTION INTRAMUSCULAR; INTRAVENOUS
Status: CANCELLED | OUTPATIENT
Start: 2024-12-27

## 2024-12-27 RX ORDER — HYDROCORTISONE SODIUM SUCCINATE 100 MG/2ML
100 INJECTION INTRAMUSCULAR; INTRAVENOUS
OUTPATIENT
Start: 2025-01-17

## 2024-12-27 RX ORDER — ACETAMINOPHEN 325 MG/1
650 TABLET ORAL
OUTPATIENT
Start: 2025-01-17

## 2024-12-27 RX ORDER — DIPHENHYDRAMINE HYDROCHLORIDE 50 MG/ML
50 INJECTION INTRAMUSCULAR; INTRAVENOUS
Status: CANCELLED | OUTPATIENT
Start: 2024-12-27

## 2024-12-27 RX ORDER — ONDANSETRON 2 MG/ML
8 INJECTION INTRAMUSCULAR; INTRAVENOUS
Status: CANCELLED | OUTPATIENT
Start: 2024-12-27

## 2024-12-27 RX ORDER — FAMOTIDINE 10 MG/ML
20 INJECTION, SOLUTION INTRAVENOUS
OUTPATIENT
Start: 2025-01-17

## 2024-12-27 RX ORDER — LANREOTIDE ACETATE 120 MG/.5ML
120 INJECTION SUBCUTANEOUS ONCE
Status: CANCELLED | OUTPATIENT
Start: 2024-12-27 | End: 2024-12-27

## 2024-12-27 RX ORDER — ALBUTEROL SULFATE 90 UG/1
4 INHALANT RESPIRATORY (INHALATION) PRN
OUTPATIENT
Start: 2025-01-17

## 2024-12-27 RX ORDER — EPINEPHRINE 1 MG/ML
0.3 INJECTION, SOLUTION, CONCENTRATE INTRAVENOUS PRN
OUTPATIENT
Start: 2025-01-17

## 2024-12-27 RX ORDER — EPINEPHRINE 1 MG/ML
0.3 INJECTION, SOLUTION, CONCENTRATE INTRAVENOUS PRN
Status: CANCELLED | OUTPATIENT
Start: 2024-12-27

## 2024-12-27 RX ORDER — SODIUM CHLORIDE 9 MG/ML
INJECTION, SOLUTION INTRAVENOUS CONTINUOUS
Status: CANCELLED | OUTPATIENT
Start: 2024-12-27

## 2024-12-27 RX ORDER — ACETAMINOPHEN 325 MG/1
650 TABLET ORAL
Status: CANCELLED | OUTPATIENT
Start: 2024-12-27

## 2024-12-27 RX ORDER — SODIUM CHLORIDE 9 MG/ML
INJECTION, SOLUTION INTRAVENOUS CONTINUOUS
OUTPATIENT
Start: 2025-01-17

## 2024-12-27 RX ADMIN — LANREOTIDE ACETATE 120 MG: 120 INJECTION SUBCUTANEOUS at 14:25

## 2024-12-27 NOTE — PROGRESS NOTES
Pt here for lanreotide.  Arrives ambulatory.  Denies any new complaints.  Labs drawn peripherally, results reviewed.  Tx complete without incident.  Pt d/c'd in stable condition.  Returns 1/15/25 for MD visit.

## 2025-01-08 ENCOUNTER — TELEPHONE (OUTPATIENT)
Dept: ONCOLOGY | Age: 55
End: 2025-01-08

## 2025-01-08 NOTE — TELEPHONE ENCOUNTER
received call from Radiation stating patient scheduled to start treatment 1/21 but they have not been able to get a hold of patient yet.  called patient and left a message.  will schedule rides for treatment.

## 2025-01-13 ENCOUNTER — TELEPHONE (OUTPATIENT)
Dept: ONCOLOGY | Age: 55
End: 2025-01-13

## 2025-01-13 NOTE — TELEPHONE ENCOUNTER
set up ride for 1/15. Message left with patient.     Transportation details:  [place]  [phone]   ~ 12:30 pm  Confirmation #[id number]  Call for ride home

## 2025-01-14 ENCOUNTER — TELEPHONE (OUTPATIENT)
Dept: ONCOLOGY | Age: 55
End: 2025-01-14

## 2025-01-14 NOTE — TELEPHONE ENCOUNTER
Patient called to confirm transportation for tomorrow's appointment.  reviewed details and updated patient on next week's rides (1/21-24).    Transportation details:  [place]  [phone]   ~ 12:30 pm  Confirmation #[id number]  Return ride 3:15pm, call for ride home 1/24

## 2025-01-15 ENCOUNTER — TELEPHONE (OUTPATIENT)
Dept: ONCOLOGY | Age: 55
End: 2025-01-15

## 2025-01-15 ENCOUNTER — OFFICE VISIT (OUTPATIENT)
Dept: ONCOLOGY | Age: 55
End: 2025-01-15
Payer: MEDICARE

## 2025-01-15 VITALS
RESPIRATION RATE: 18 BRPM | TEMPERATURE: 97.1 F | OXYGEN SATURATION: 97 % | BODY MASS INDEX: 40 KG/M2 | DIASTOLIC BLOOD PRESSURE: 66 MMHG | SYSTOLIC BLOOD PRESSURE: 123 MMHG | WEIGHT: 201.4 LBS | HEART RATE: 90 BPM

## 2025-01-15 DIAGNOSIS — Z79.899 HIGH RISK MEDICATION USE: ICD-10-CM

## 2025-01-15 DIAGNOSIS — D3A.8 NEUROENDOCRINE TUMOR: Primary | ICD-10-CM

## 2025-01-15 PROBLEM — C25.4 MALIGNANT NEOPLASM OF ENDOCRINE PANCREAS (HCC): Status: RESOLVED | Noted: 2024-11-24 | Resolved: 2025-01-15

## 2025-01-15 PROCEDURE — G8417 CALC BMI ABV UP PARAM F/U: HCPCS | Performed by: INTERNAL MEDICINE

## 2025-01-15 PROCEDURE — 3017F COLORECTAL CA SCREEN DOC REV: CPT | Performed by: INTERNAL MEDICINE

## 2025-01-15 PROCEDURE — G8427 DOCREV CUR MEDS BY ELIG CLIN: HCPCS | Performed by: INTERNAL MEDICINE

## 2025-01-15 PROCEDURE — 1036F TOBACCO NON-USER: CPT | Performed by: INTERNAL MEDICINE

## 2025-01-15 PROCEDURE — 99214 OFFICE O/P EST MOD 30 MIN: CPT | Performed by: INTERNAL MEDICINE

## 2025-01-15 PROCEDURE — 99211 OFF/OP EST MAY X REQ PHY/QHP: CPT | Performed by: INTERNAL MEDICINE

## 2025-01-15 NOTE — TELEPHONE ENCOUNTER
Instructions   from Dr. Albaro Davison MD    Tx today   Rv in 2 months with tx     Rv scheduled on 3/19/2025 @1:45; gave pt a copy of their AVS  **spoke with dr davison, and after confirming their note with them, he claims it is too soon for pt to have tx today, though will have tx next week, and then will see pt again in 2 months

## 2025-01-15 NOTE — PROGRESS NOTES
neuroendocrine tumor TECHNOLOGIST PROVIDED HISTORY: STAT Creatinine as needed:->Yes assess responce to treatment Reason for Exam: NO  ABDOMINAL COMPLAINTS PER PATIENT FINDINGS: Chest: Mediastinum: Thyroid gland is normal.  Normal heart size.  No pericardial effusion.  No mediastinal or hilar adenopathy. Lungs/pleura: Ground-glass opacity in the right apex measuring 0.9 x 1.3 cm (series 4, image 26) semi-solid nodule in the left upper lobe measuring 3 mm (series 4, image 34).  Additional ground-glass opacity in the more inferior right upper lobe measures 1.2 x 1.7 cm (series 4, image 42).  Semi-solid nodule in the left lower lobe measures 1.0 x 1.6 cm (series 4, image 75).  No pneumothorax or pleural effusion. Soft Tissues/Bones: Multilevel degenerative changes of the thoracic spine. Abdomen/Pelvis: Organs: Normal liver morphology.  Liver is diffusely hypodense, consistent with steatosis.  There is no focal suspicious mass.  No intra or extrahepatic biliary dilatation.  The gallbladder is distended, without wall thickening or pericholecystic fluid. Adrenals are normal.  Spleen is normal.  Enhancing mass within the pancreatic head measures 3.5 x 3.1 cm (series 6, image 56), previously 3.2 by 2.7 cm on similar remeasurement.  Enlarged peripancreatic lymph nodes are again seen. The kidneys enhance normally bilaterally without focal suspicious mass.  No renal calculi or hydronephrosis.  The bladder is nondistended, and appears thick-walled. GI/Bowel: The stomach is normal.  The small bowel and colon are nondilated. The appendix is normal. Pelvis: Normal uterus.  No suspicious adnexal masses. Peritoneum/Retroperitoneum: The celiac artery, SMA and GENEVIEVE are patent.  Again seen is mild stenosis in the proximal SMA.  The renal arteries are patent bilaterally.  The portal vein, splenic vein and SMV are patent.  Retroaortic left renal vein.  No pelvic lymphadenopathy.  No ascites or pneumoperitoneum. Bones/Soft Tissues:

## 2025-01-15 NOTE — TELEPHONE ENCOUNTER
Name: Marilyn Campo  : 1970  MRN: [id number]    Oncology Navigation Follow-Up Note    Contact Type:  Medical Oncology    Notes: [facility] for Dr. Mahan f/u.  Pt denied questions/concerns.  Encouraged to contact writer prn.  Will continue to follow.      Electronically signed by Jennifer Pleitez RN on 1/15/2025 at 2:48 PM

## 2025-01-15 NOTE — TELEPHONE ENCOUNTER
Medical Assistant reached out stating patient had reported  had wrong address today (picked up a couple houses away).  called to make sure insurance provider had correct address listed in chart.  will continue to follow.

## 2025-01-21 ENCOUNTER — HOSPITAL ENCOUNTER (OUTPATIENT)
Dept: RADIATION ONCOLOGY | Age: 55
Discharge: HOME | End: 2025-01-21
Payer: MEDICARE

## 2025-01-21 PROCEDURE — 77386 HC NTSTY MODUL RAD TX DLVR CPLX: CPT | Performed by: RADIOLOGY

## 2025-01-22 ENCOUNTER — HOSPITAL ENCOUNTER (OUTPATIENT)
Dept: RADIATION ONCOLOGY | Age: 55
Discharge: HOME | End: 2025-01-22
Payer: MEDICARE

## 2025-01-22 ENCOUNTER — TELEPHONE (OUTPATIENT)
Dept: ONCOLOGY | Age: 55
End: 2025-01-22

## 2025-01-22 VITALS
WEIGHT: 202.2 LBS | HEART RATE: 93 BPM | TEMPERATURE: 98.6 F | DIASTOLIC BLOOD PRESSURE: 85 MMHG | BODY MASS INDEX: 40.16 KG/M2 | SYSTOLIC BLOOD PRESSURE: 133 MMHG | RESPIRATION RATE: 16 BRPM | OXYGEN SATURATION: 98 %

## 2025-01-22 PROCEDURE — 77386 HC NTSTY MODUL RAD TX DLVR CPLX: CPT | Performed by: RADIOLOGY

## 2025-01-22 PROCEDURE — 77336 RADIATION PHYSICS CONSULT: CPT | Performed by: RADIOLOGY

## 2025-01-22 ASSESSMENT — PAIN SCALES - GENERAL: PAINLEVEL_OUTOF10: 0

## 2025-01-22 NOTE — PROGRESS NOTES
[facility]       Radiation Oncology          [address]          [place] [id number]        O: [phone]        F: [phone]       [facility]             RADIATION ONCOLOGY WEEKLY PROGRESS NOTE  Patient ID:   Marilyn Campo  : 1970   MRN: [id number]    Location:  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  Reports fatigue, denies nausea, vomiting, changes in bowel habits    OBJECTIVE:     ECO Asymptomatic    VITAL SIGNS: /85   Pulse 93   Temp 98.6 °F (37 °C) (Temporal)   Resp 16   Wt 91.7 kg (202 lb 3.2 oz)   SpO2 98%   BMI 40.16 kg/m²   Wt Readings from Last 5 Encounters:   25 91.7 kg (202 lb 3.2 oz)   01/15/25 91.4 kg (201 lb 6.4 oz)   24 88.7 kg (195 lb 9.6 oz)   24 90.2 kg (198 lb 14.4 oz)   24 90.2 kg (198 lb 14.4 oz)     GENERAL:  General appearance is that of a well-nourished, well-developed in no apparent distress.  HEART:  Normal rate and regular rhythm  LUNGS:  Pulmonary effort normal.  ABDOMEN:  Soft, nontender, non distended  EXTREMITIES:  No edema.  No calf tenderness.  MSK:  No spinal tenderness. Normal ROM.  NEUROLOGICAL: Alert and oriented. Strength and sensation intact bilaterally. No focal deficits.   PSYCH: Mood normal, behavior normal.      LABS:  WBC   Date Value Ref Range Status   2024 6.0 3.5 - 11.0 k/uL Final   2024 4.6 3.5 - 11.0 k/uL Final   10/25/2024 5.2 3.5 - 11.0 k/uL Final     Neutrophils Absolute   Date Value Ref Range

## 2025-01-22 NOTE — PROGRESS NOTES
Marilyn Campo  1/22/2025  Wt Readings from Last 3 Encounters:   01/22/25 91.7 kg (202 lb 3.2 oz)   01/15/25 91.4 kg (201 lb 6.4 oz)   12/06/24 88.7 kg (195 lb 9.6 oz)     Body mass index is 40.16 kg/m².        Treatment Area: Pancreas        Comfort Alteration  Fatigue: Moderate      Nutritional Alteration  Anorexia: No   Nausea: No   Vomiting: No       Elimination Alterations  Constipation: no  Diarrhea:  no    Skin Alteration   Sensation: wnl    Radiation Dermatitis:  Intact [x]     Erythema  []     Discoloration  []     Rash []     Dry desquamation  []     Moist desquamation []       Emotional  Coping: effective      Injury, potential bleeding or infection:     Lab Results   Component Value Date    WBC 6.0 12/27/2024    HGB 12.7 12/27/2024    HCT 37.8 12/27/2024     12/27/2024         /85   Pulse 93   Temp 98.6 °F (37 °C) (Temporal)   Resp 16   Wt 91.7 kg (202 lb 3.2 oz)   SpO2 98%   BMI 40.16 kg/m²      Pain Assessment: 0-10  Pain Level: 0           Assessment/Plan: Patient was seen today for weekly visit.  She denies side effects of radiation treatments and early into treatment.  Plan of care ongoing.      Eleanor Ruelas RN

## 2025-01-22 NOTE — TELEPHONE ENCOUNTER
set up transportation for next week's treatments. Patient updated.     Transportation details:  [facility]  [phone]   ~ 12:30 pm  Confirmation #[id number]  Return ride 3:15pm

## 2025-01-23 ENCOUNTER — HOSPITAL ENCOUNTER (OUTPATIENT)
Dept: RADIATION ONCOLOGY | Age: 55
Discharge: HOME | End: 2025-01-23
Payer: MEDICARE

## 2025-01-23 PROCEDURE — 77386 HC NTSTY MODUL RAD TX DLVR CPLX: CPT | Performed by: RADIOLOGY

## 2025-01-24 ENCOUNTER — HOSPITAL ENCOUNTER (OUTPATIENT)
Dept: INFUSION THERAPY | Age: 55
Discharge: HOME | End: 2025-01-24
Payer: MEDICARE

## 2025-01-24 ENCOUNTER — HOSPITAL ENCOUNTER (OUTPATIENT)
Dept: RADIATION ONCOLOGY | Age: 55
Discharge: HOME | End: 2025-01-24
Payer: MEDICARE

## 2025-01-24 DIAGNOSIS — K31.819 GAVE (GASTRIC ANTRAL VASCULAR ECTASIA): ICD-10-CM

## 2025-01-24 DIAGNOSIS — D3A.8 PRIMARY PANCREATIC NEUROENDOCRINE TUMOR: Primary | ICD-10-CM

## 2025-01-24 DIAGNOSIS — Z79.899 HIGH RISK MEDICATION USE: ICD-10-CM

## 2025-01-24 DIAGNOSIS — R73.9 HYPERGLYCEMIA: ICD-10-CM

## 2025-01-24 DIAGNOSIS — K86.89 PANCREATIC MASS: ICD-10-CM

## 2025-01-24 LAB
ALBUMIN SERPL-MCNC: 4.2 G/DL (ref 3.5–5.2)
ALBUMIN/GLOB SERPL: 1.3 {RATIO} (ref 1–2.5)
ALP SERPL-CCNC: 119 U/L (ref 35–104)
ALT SERPL-CCNC: 23 U/L (ref 5–33)
ANION GAP SERPL CALCULATED.3IONS-SCNC: 11 MMOL/L (ref 9–17)
AST SERPL-CCNC: 24 U/L
BASOPHILS # BLD: 0.1 K/UL (ref 0–0.2)
BASOPHILS NFR BLD: 1 % (ref 0–2)
BILIRUB SERPL-MCNC: 0.4 MG/DL (ref 0.3–1.2)
BUN SERPL-MCNC: 16 MG/DL (ref 6–20)
CALCIUM SERPL-MCNC: 9.1 MG/DL (ref 8.6–10.4)
CHLORIDE SERPL-SCNC: 105 MMOL/L (ref 98–107)
CO2 SERPL-SCNC: 23 MMOL/L (ref 20–31)
CREAT SERPL-MCNC: 1.1 MG/DL (ref 0.5–0.9)
EOSINOPHIL # BLD: 0.3 K/UL (ref 0–0.4)
EOSINOPHILS RELATIVE PERCENT: 5 % (ref 1–4)
ERYTHROCYTE [DISTWIDTH] IN BLOOD BY AUTOMATED COUNT: 13.1 % (ref 12.5–15.4)
FERRITIN SERPL-MCNC: 157 NG/ML (ref 15–150)
GFR, ESTIMATED: 59 ML/MIN/1.73M2
GLUCOSE SERPL-MCNC: 123 MG/DL (ref 70–99)
HCT VFR BLD AUTO: 36.2 % (ref 36–46)
HGB BLD-MCNC: 12.2 G/DL (ref 12–16)
IRON SATN MFR SERPL: 30 % (ref 20–55)
IRON SERPL-MCNC: 93 UG/DL (ref 37–145)
LYMPHOCYTES NFR BLD: 1 K/UL (ref 1–4.8)
LYMPHOCYTES RELATIVE PERCENT: 21 % (ref 24–44)
MCH RBC QN AUTO: 30.9 PG (ref 26–34)
MCHC RBC AUTO-ENTMCNC: 33.7 G/DL (ref 31–37)
MCV RBC AUTO: 91.5 FL (ref 80–100)
MONOCYTES NFR BLD: 0.2 K/UL (ref 0.1–1.2)
MONOCYTES NFR BLD: 5 % (ref 2–11)
NEUTROPHILS NFR BLD: 68 % (ref 36–66)
NEUTS SEG NFR BLD: 3.5 K/UL (ref 1.8–7.7)
PLATELET # BLD AUTO: 242 K/UL (ref 140–450)
PMV BLD AUTO: 7.1 FL (ref 6–12)
POTASSIUM SERPL-SCNC: 4.4 MMOL/L (ref 3.7–5.3)
PROT SERPL-MCNC: 7.4 G/DL (ref 6.4–8.3)
RBC # BLD AUTO: 3.96 M/UL (ref 4–5.2)
SODIUM SERPL-SCNC: 139 MMOL/L (ref 135–144)
TIBC SERPL-MCNC: 309 UG/DL (ref 250–450)
UNSATURATED IRON BINDING CAPACITY: 216 UG/DL (ref 112–347)
WBC OTHER # BLD: 5.1 K/UL (ref 3.5–11)

## 2025-01-24 PROCEDURE — 36415 COLL VENOUS BLD VENIPUNCTURE: CPT

## 2025-01-24 PROCEDURE — 6360000002 HC RX W HCPCS: Performed by: INTERNAL MEDICINE

## 2025-01-24 PROCEDURE — 77386 HC NTSTY MODUL RAD TX DLVR CPLX: CPT | Performed by: RADIOLOGY

## 2025-01-24 PROCEDURE — 83550 IRON BINDING TEST: CPT

## 2025-01-24 PROCEDURE — 82728 ASSAY OF FERRITIN: CPT

## 2025-01-24 PROCEDURE — 96401 CHEMO ANTI-NEOPL SQ/IM: CPT

## 2025-01-24 PROCEDURE — 83540 ASSAY OF IRON: CPT

## 2025-01-24 PROCEDURE — 80053 COMPREHEN METABOLIC PANEL: CPT

## 2025-01-24 PROCEDURE — 85025 COMPLETE CBC W/AUTO DIFF WBC: CPT

## 2025-01-24 RX ORDER — SODIUM CHLORIDE 9 MG/ML
INJECTION, SOLUTION INTRAVENOUS CONTINUOUS
OUTPATIENT
Start: 2025-02-21

## 2025-01-24 RX ORDER — FAMOTIDINE 10 MG/ML
20 INJECTION, SOLUTION INTRAVENOUS
OUTPATIENT
Start: 2025-02-21

## 2025-01-24 RX ORDER — ALBUTEROL SULFATE 90 UG/1
4 INHALANT RESPIRATORY (INHALATION) PRN
OUTPATIENT
Start: 2025-02-21

## 2025-01-24 RX ORDER — LANREOTIDE ACETATE 120 MG/.5ML
120 INJECTION SUBCUTANEOUS ONCE
Status: COMPLETED | OUTPATIENT
Start: 2025-01-24 | End: 2025-01-24

## 2025-01-24 RX ORDER — ACETAMINOPHEN 325 MG/1
650 TABLET ORAL
OUTPATIENT
Start: 2025-02-21

## 2025-01-24 RX ORDER — EPINEPHRINE 1 MG/ML
0.3 INJECTION, SOLUTION, CONCENTRATE INTRAVENOUS PRN
OUTPATIENT
Start: 2025-02-21

## 2025-01-24 RX ORDER — ONDANSETRON 2 MG/ML
8 INJECTION INTRAMUSCULAR; INTRAVENOUS
OUTPATIENT
Start: 2025-02-21

## 2025-01-24 RX ORDER — HYDROCORTISONE SODIUM SUCCINATE 100 MG/2ML
100 INJECTION INTRAMUSCULAR; INTRAVENOUS
OUTPATIENT
Start: 2025-02-21

## 2025-01-24 RX ORDER — LANREOTIDE ACETATE 120 MG/.5ML
120 INJECTION SUBCUTANEOUS ONCE
OUTPATIENT
Start: 2025-02-21 | End: 2025-02-21

## 2025-01-24 RX ORDER — DIPHENHYDRAMINE HYDROCHLORIDE 50 MG/ML
50 INJECTION INTRAMUSCULAR; INTRAVENOUS
OUTPATIENT
Start: 2025-02-21

## 2025-01-24 RX ADMIN — LANREOTIDE ACETATE 120 MG: 120 INJECTION SUBCUTANEOUS at 13:52

## 2025-01-24 NOTE — PROGRESS NOTES
Pt here for Lanreotide injection.    Injection given without incident.  Pt discharged in stable condition.   Returns 2-19-25 for next injection.

## 2025-01-27 ENCOUNTER — HOSPITAL ENCOUNTER (OUTPATIENT)
Dept: RADIATION ONCOLOGY | Age: 55
Discharge: HOME | End: 2025-01-27
Payer: MEDICARE

## 2025-01-27 PROCEDURE — 77386 HC NTSTY MODUL RAD TX DLVR CPLX: CPT | Performed by: RADIOLOGY

## 2025-01-28 ENCOUNTER — HOSPITAL ENCOUNTER (OUTPATIENT)
Dept: RADIATION ONCOLOGY | Age: 55
Discharge: HOME | End: 2025-01-28
Payer: MEDICARE

## 2025-01-28 PROCEDURE — 77386 HC NTSTY MODUL RAD TX DLVR CPLX: CPT | Performed by: RADIOLOGY

## 2025-01-29 ENCOUNTER — TELEPHONE (OUTPATIENT)
Dept: ONCOLOGY | Age: 55
End: 2025-01-29

## 2025-01-29 ENCOUNTER — HOSPITAL ENCOUNTER (OUTPATIENT)
Dept: RADIATION ONCOLOGY | Age: 55
Discharge: HOME | End: 2025-01-29
Payer: MEDICARE

## 2025-01-29 VITALS
SYSTOLIC BLOOD PRESSURE: 128 MMHG | RESPIRATION RATE: 16 BRPM | BODY MASS INDEX: 40 KG/M2 | HEART RATE: 88 BPM | OXYGEN SATURATION: 96 % | DIASTOLIC BLOOD PRESSURE: 84 MMHG | WEIGHT: 201.4 LBS | TEMPERATURE: 98 F

## 2025-01-29 PROCEDURE — 77386 HC NTSTY MODUL RAD TX DLVR CPLX: CPT | Performed by: RADIOLOGY

## 2025-01-29 PROCEDURE — 77336 RADIATION PHYSICS CONSULT: CPT | Performed by: RADIOLOGY

## 2025-01-29 NOTE — PROGRESS NOTES
[facility] Radiation Oncology Progress Note: [facility]    Situation: Writer encountered Patient as she was arriving to the waiting area of the radiation oncology treatment area.     Assessment: Patient smiled and greeted writer. Pt shared how she was doing and the reason she was coming for treatment. Pt spoke openly and freely about her sources of support, including her earle and family as well as past and present stressors. Pt stated her intention to stay positive and to believe that God does not  her nor is she being punished. Pt gave thanks for the positive aspects of her life, including her family and friends. Pt has made some end of life preparations to ensure her son with special needs has sufficient support. Pt acknowledged her mortality and how she is accepting of this. Pt voiced hopes that she will continue to do well and be able to enjoy her life.     Intervention: Writer introduced herself and her services. Writer inquired about Pt's coping and needs. Writer explored Pt's sources of support and strength. Writer offered supportive presence and active listening. Writer affirmed Pt's strengths. Writer offered words of support and encouragement.     Outcome:  Pt thanked writer for the visit.    Plan: Spiritual [facility] Services are available for Patient by phone and/or in person.      01/29/25 1539   Encounter Summary   Encounter Overview/Reason Spiritual/Emotional Needs   Service Provided For Patient   Referral/Consult From [facility]   Support System Family members;Children;Friends/neighbors   Last Encounter  01/29/25   Complexity of Encounter Moderate   Begin Time 1350   End Time  1410   Total Time Calculated 20 min   Spiritual/Emotional needs   Type Spiritual Support   Assessment/Intervention/Outcome   Assessment Coping;Calm   Intervention Sustaining Presence/Ministry of presence;Active listening;Explored/Affirmed feelings, thoughts, concerns;Explored Coping

## 2025-01-29 NOTE — PROGRESS NOTES
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  Tolerating therapy well, denies nausea, vomiting, no other complaints    OBJECTIVE:     ECO Asymptomatic    VITAL SIGNS: /84   Pulse 88   Temp 98 °F (36.7 °C) (Temporal)   Resp 16   Wt 91.4 kg (201 lb 6.4 oz)   SpO2 96%   BMI 40.00 kg/m²   Wt Readings from Last 5 Encounters:   25 91.4 kg (201 lb 6.4 oz)   25 91.7 kg (202 lb 3.2 oz)   01/15/25 91.4 kg (201 lb 6.4 oz)   24 88.7 kg (195 lb 9.6 oz)   24 90.2 kg (198 lb 14.4 oz)     GENERAL:  General appearance is that of a well-nourished, well-developed in no apparent distress.  HEART:  Normal rate and regular rhythm  LUNGS:  Pulmonary effort normal.  ABDOMEN:  Soft, nontender, non distended  EXTREMITIES:  No edema.  No calf tenderness.  MSK:  No spinal tenderness. Normal ROM.  NEUROLOGICAL: Alert and oriented. Strength and sensation intact bilaterally. No focal deficits.   PSYCH: Mood normal, behavior normal.      LABS:  WBC   Date Value Ref Range Status   2025 5.1 3.5 - 11.0 k/uL Final   2024 6.0 3.5 - 11.0 k/uL Final   2024 4.6 3.5 - 11.0 k/uL Final     Neutrophils Absolute   Date Value Ref

## 2025-01-29 NOTE — PROGRESS NOTES
Marilyn Campo  1/29/2025  Wt Readings from Last 3 Encounters:   01/29/25 91.4 kg (201 lb 6.4 oz)   01/22/25 91.7 kg (202 lb 3.2 oz)   01/15/25 91.4 kg (201 lb 6.4 oz)     Body mass index is 40 kg/m².        Treatment Area: Pancreas        Comfort Alteration  Fatigue: Moderate      Nutritional Alteration  Anorexia: No   Nausea: No   Vomiting: No       Elimination Alterations  Constipation: no  Diarrhea:  no    Skin Alteration   Sensation: wnl    Radiation Dermatitis:  Intact [x]     Erythema  []     Discoloration  []     Rash []     Dry desquamation  []     Moist desquamation []       Emotional  Coping: effective      Injury, potential bleeding or infection:     Lab Results   Component Value Date    WBC 5.1 01/24/2025    HGB 12.2 01/24/2025    HCT 36.2 01/24/2025     01/24/2025         /84   Pulse 88   Temp 98 °F (36.7 °C) (Temporal)   Resp 16   Wt 91.4 kg (201 lb 6.4 oz)   SpO2 96%   BMI 40.00 kg/m²      Pain Assessment: None - Denies Pain              Assessment/Plan: Patient was seen today for weekly visit.  She arrives ambulatory using rolling walker. She denies side effects of radiation treatments. She reports occasional \"stomach ache\" but denies currently. She states that her appetite remains good. Dr. Butterfield evaluated patient.  Continue plan of care.    Gwendolyn Dickson RN

## 2025-01-29 NOTE — TELEPHONE ENCOUNTER
set up transportation for next week's treatments. Patient updated.      Transportation details:  [place]  [phone]   ~ 12:30 pm  Confirmation #[id number]  Return ride 3:15pm

## 2025-01-30 ENCOUNTER — HOSPITAL ENCOUNTER (OUTPATIENT)
Dept: RADIATION ONCOLOGY | Age: 55
Discharge: HOME | End: 2025-01-30
Payer: MEDICARE

## 2025-01-30 PROCEDURE — 77386 HC NTSTY MODUL RAD TX DLVR CPLX: CPT | Performed by: RADIOLOGY

## 2025-01-31 ENCOUNTER — HOSPITAL ENCOUNTER (OUTPATIENT)
Dept: RADIATION ONCOLOGY | Age: 55
Discharge: HOME | End: 2025-01-31
Payer: MEDICARE

## 2025-01-31 PROCEDURE — 77386 HC NTSTY MODUL RAD TX DLVR CPLX: CPT | Performed by: RADIOLOGY

## 2025-02-03 ENCOUNTER — HOSPITAL ENCOUNTER (OUTPATIENT)
Dept: RADIATION ONCOLOGY | Age: 55
Discharge: HOME | End: 2025-02-03
Payer: MEDICARE

## 2025-02-03 PROCEDURE — 77386 HC NTSTY MODUL RAD TX DLVR CPLX: CPT | Performed by: RADIOLOGY

## 2025-02-04 ENCOUNTER — HOSPITAL ENCOUNTER (OUTPATIENT)
Dept: RADIATION ONCOLOGY | Age: 55
Discharge: HOME | End: 2025-02-04
Payer: MEDICARE

## 2025-02-04 ENCOUNTER — TELEPHONE (OUTPATIENT)
Dept: ONCOLOGY | Age: 55
End: 2025-02-04

## 2025-02-04 PROCEDURE — 77386 HC NTSTY MODUL RAD TX DLVR CPLX: CPT | Performed by: RADIOLOGY

## 2025-02-04 NOTE — TELEPHONE ENCOUNTER
set up transportation for 2/10 treatment. Patient updated.      Transportation details:  [place]  [phone]   ~ 1:30 pm  Confirmation #[id number]  Return ride 3:15pm

## 2025-02-05 ENCOUNTER — HOSPITAL ENCOUNTER (OUTPATIENT)
Dept: RADIATION ONCOLOGY | Age: 55
Discharge: HOME | End: 2025-02-05
Payer: MEDICARE

## 2025-02-05 VITALS
HEART RATE: 79 BPM | SYSTOLIC BLOOD PRESSURE: 142 MMHG | TEMPERATURE: 98.2 F | BODY MASS INDEX: 40.08 KG/M2 | WEIGHT: 201.8 LBS | DIASTOLIC BLOOD PRESSURE: 98 MMHG | OXYGEN SATURATION: 99 % | RESPIRATION RATE: 16 BRPM

## 2025-02-05 DIAGNOSIS — D3A.8 NEUROENDOCRINE TUMOR: Primary | ICD-10-CM

## 2025-02-05 PROCEDURE — 77386 HC NTSTY MODUL RAD TX DLVR CPLX: CPT | Performed by: RADIOLOGY

## 2025-02-05 NOTE — PROGRESS NOTES
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  Tolerating therapy well, denies nausea vomiting, weight is stable    OBJECTIVE:     ECO Asymptomatic    VITAL SIGNS: BP (!) 142/98   Pulse 79   Temp 98.2 °F (36.8 °C) (Temporal)   Resp 16   Wt 91.5 kg (201 lb 12.8 oz)   SpO2 99%   BMI 40.08 kg/m²   Wt Readings from Last 5 Encounters:   25 91.5 kg (201 lb 12.8 oz)   25 91.4 kg (201 lb 6.4 oz)   25 91.7 kg (202 lb 3.2 oz)   01/15/25 91.4 kg (201 lb 6.4 oz)   24 88.7 kg (195 lb 9.6 oz)     GENERAL:  General appearance is that of a well-nourished, well-developed in no apparent distress.  HEART:  Normal rate and regular rhythm  LUNGS:  Pulmonary effort normal.  ABDOMEN:  Soft, nontender, non distended  EXTREMITIES:  No edema.  No calf tenderness.  MSK:  No spinal tenderness. Normal ROM.  NEUROLOGICAL: Alert and oriented. Strength and sensation intact bilaterally. No focal deficits.   PSYCH: Mood normal, behavior normal.      LABS:  WBC   Date Value Ref Range Status   2025 5.1 3.5 - 11.0 k/uL Final   2024 6.0 3.5 - 11.0 k/uL Final   2024 4.6 3.5 - 11.0 k/uL Final     Neutrophils Absolute   Date Value
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
## 2025-02-06 ENCOUNTER — HOSPITAL ENCOUNTER (OUTPATIENT)
Dept: RADIATION ONCOLOGY | Age: 55
Discharge: HOME | End: 2025-02-06
Payer: MEDICARE

## 2025-02-06 PROCEDURE — 77386 HC NTSTY MODUL RAD TX DLVR CPLX: CPT | Performed by: RADIOLOGY

## 2025-02-07 ENCOUNTER — HOSPITAL ENCOUNTER (OUTPATIENT)
Dept: RADIATION ONCOLOGY | Age: 55
Discharge: HOME | End: 2025-02-07
Payer: MEDICARE

## 2025-02-07 PROCEDURE — 77386 HC NTSTY MODUL RAD TX DLVR CPLX: CPT | Performed by: RADIOLOGY

## 2025-02-10 ENCOUNTER — HOSPITAL ENCOUNTER (OUTPATIENT)
Dept: RADIATION ONCOLOGY | Age: 55
Discharge: HOME | End: 2025-02-10
Payer: MEDICARE

## 2025-02-10 PROCEDURE — 77386 HC NTSTY MODUL RAD TX DLVR CPLX: CPT | Performed by: RADIOLOGY

## 2025-02-11 ENCOUNTER — TELEPHONE (OUTPATIENT)
Dept: ONCOLOGY | Age: 55
End: 2025-02-11

## 2025-02-11 NOTE — TELEPHONE ENCOUNTER
set up transportation for 2/19 treatment. Patient updated.      Transportation details:  [facility]  [phone]   ~ 12:30 pm  Confirmation #[id number]  Call for return ride

## 2025-02-12 ENCOUNTER — CLINICAL DOCUMENTATION (OUTPATIENT)
Dept: RADIATION ONCOLOGY | Age: 55
End: 2025-02-12

## 2025-02-12 NOTE — PROGRESS NOTES
[facility]            Radiation Oncology          [address] [place] [address]        O: [phone]        F: [phone]       [facility]           Dear Dr Day ref. provider found:    Thank you for referring Marilyn Campo to me for evaluation and treatment. Below is a summary of the patient's recently completed radiation course.  If you have questions, please do not hesitate to call me. I look forward to following this patient along with you.    Sincerely,  Electronically signed by Lencho Butterfield MD on 2025 at 9:21 AM EST      CC: Patient Care Team:  Mauri Harmon MD as PCP - General (Internal Medicine)  Jennifer Pleitez RN as Nurse Navigator (Oncology)  Kristina Mayo MD as Consulting Physician (Surgical Oncology)  ------------------------------------------------------------------------------------------------------------------------------------------------------------------------------------------        Date of Service: 2025     Location:  [facility] Radiation Oncology,   [address] [place] [address], [place], [place] [address] [phone]        RADIATION ONCOLOGY END OF TREATMENT SUMMARY:    Patient ID:   Marilyn Campo  : 1970   MRN: [id number]    DIAGNOSIS:  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  She experienced fatigue, mild nausea and abdominal discomfort.  Otherwise no significant toxicities were reported.  Patient is to return to the radiation clinic in 4 months with repeat scan to assess treatment response.    Electronically signed by Lencho Butterfield MD on 2025 at 9:21 AM

## 2025-02-17 ENCOUNTER — TELEPHONE (OUTPATIENT)
Dept: ONCOLOGY | Age: 55
End: 2025-02-17

## 2025-02-17 NOTE — TELEPHONE ENCOUNTER
received message stating patient had called office asking about transportation.  called patient and reviewed details for 2/19 appointment.

## 2025-02-19 ENCOUNTER — HOSPITAL ENCOUNTER (OUTPATIENT)
Dept: INFUSION THERAPY | Age: 55
Discharge: HOME | End: 2025-02-19
Payer: MEDICARE

## 2025-02-19 DIAGNOSIS — D3A.8 PRIMARY PANCREATIC NEUROENDOCRINE TUMOR (HCC): Primary | ICD-10-CM

## 2025-02-19 LAB
ALBUMIN SERPL-MCNC: 4.1 G/DL (ref 3.5–5.2)
ALBUMIN/GLOB SERPL: 1.2 {RATIO} (ref 1–2.5)
ALP SERPL-CCNC: 138 U/L (ref 35–104)
ALT SERPL-CCNC: 19 U/L (ref 5–33)
ANION GAP SERPL CALCULATED.3IONS-SCNC: 9 MMOL/L (ref 9–17)
AST SERPL-CCNC: 22 U/L
BASOPHILS # BLD: 0.1 K/UL (ref 0–0.2)
BASOPHILS NFR BLD: 2 % (ref 0–2)
BILIRUB SERPL-MCNC: 0.4 MG/DL (ref 0.3–1.2)
BUN SERPL-MCNC: 13 MG/DL (ref 6–20)
CALCIUM SERPL-MCNC: 9.2 MG/DL (ref 8.6–10.4)
CHLORIDE SERPL-SCNC: 108 MMOL/L (ref 98–107)
CO2 SERPL-SCNC: 24 MMOL/L (ref 20–31)
CREAT SERPL-MCNC: 0.9 MG/DL (ref 0.5–0.9)
EOSINOPHIL # BLD: 0.8 K/UL (ref 0–0.4)
EOSINOPHILS RELATIVE PERCENT: 18 % (ref 1–4)
ERYTHROCYTE [DISTWIDTH] IN BLOOD BY AUTOMATED COUNT: 13.6 % (ref 12.5–15.4)
GFR, ESTIMATED: 75 ML/MIN/1.73M2
GLUCOSE SERPL-MCNC: 200 MG/DL (ref 70–99)
HCT VFR BLD AUTO: 35.5 % (ref 36–46)
HGB BLD-MCNC: 12 G/DL (ref 12–16)
LYMPHOCYTES NFR BLD: 0.6 K/UL (ref 1–4.8)
LYMPHOCYTES RELATIVE PERCENT: 13 % (ref 24–44)
MCH RBC QN AUTO: 31.2 PG (ref 26–34)
MCHC RBC AUTO-ENTMCNC: 33.8 G/DL (ref 31–37)
MCV RBC AUTO: 92.4 FL (ref 80–100)
MONOCYTES NFR BLD: 0.3 K/UL (ref 0.1–1.2)
MONOCYTES NFR BLD: 7 % (ref 2–11)
NEUTROPHILS NFR BLD: 60 % (ref 36–66)
NEUTS SEG NFR BLD: 2.5 K/UL (ref 1.8–7.7)
PLATELET # BLD AUTO: 170 K/UL (ref 140–450)
PMV BLD AUTO: 7 FL (ref 6–12)
POTASSIUM SERPL-SCNC: 4 MMOL/L (ref 3.7–5.3)
PROT SERPL-MCNC: 7.5 G/DL (ref 6.4–8.3)
RBC # BLD AUTO: 3.84 M/UL (ref 4–5.2)
SODIUM SERPL-SCNC: 141 MMOL/L (ref 135–144)
WBC OTHER # BLD: 4.2 K/UL (ref 3.5–11)

## 2025-02-19 PROCEDURE — 6360000002 HC RX W HCPCS: Performed by: INTERNAL MEDICINE

## 2025-02-19 PROCEDURE — 96372 THER/PROPH/DIAG INJ SC/IM: CPT

## 2025-02-19 PROCEDURE — 36415 COLL VENOUS BLD VENIPUNCTURE: CPT

## 2025-02-19 PROCEDURE — 80053 COMPREHEN METABOLIC PANEL: CPT

## 2025-02-19 PROCEDURE — 85025 COMPLETE CBC W/AUTO DIFF WBC: CPT

## 2025-02-19 RX ORDER — LANREOTIDE ACETATE 120 MG/.5ML
120 INJECTION SUBCUTANEOUS ONCE
OUTPATIENT
Start: 2025-02-21 | End: 2025-02-21

## 2025-02-19 RX ORDER — LANREOTIDE ACETATE 120 MG/.5ML
120 INJECTION SUBCUTANEOUS ONCE
Status: COMPLETED | OUTPATIENT
Start: 2025-02-19 | End: 2025-02-19

## 2025-02-19 RX ORDER — ALBUTEROL SULFATE 90 UG/1
4 INHALANT RESPIRATORY (INHALATION) PRN
OUTPATIENT
Start: 2025-02-21

## 2025-02-19 RX ORDER — EPINEPHRINE 1 MG/ML
0.3 INJECTION, SOLUTION, CONCENTRATE INTRAVENOUS PRN
OUTPATIENT
Start: 2025-02-21

## 2025-02-19 RX ORDER — SODIUM CHLORIDE 9 MG/ML
INJECTION, SOLUTION INTRAVENOUS CONTINUOUS
OUTPATIENT
Start: 2025-02-21

## 2025-02-19 RX ORDER — ACETAMINOPHEN 325 MG/1
650 TABLET ORAL
OUTPATIENT
Start: 2025-02-21

## 2025-02-19 RX ORDER — DIPHENHYDRAMINE HYDROCHLORIDE 50 MG/ML
50 INJECTION INTRAMUSCULAR; INTRAVENOUS
OUTPATIENT
Start: 2025-02-21

## 2025-02-19 RX ORDER — HYDROCORTISONE SODIUM SUCCINATE 100 MG/2ML
100 INJECTION INTRAMUSCULAR; INTRAVENOUS
OUTPATIENT
Start: 2025-02-21

## 2025-02-19 RX ORDER — ONDANSETRON 2 MG/ML
8 INJECTION INTRAMUSCULAR; INTRAVENOUS
OUTPATIENT
Start: 2025-02-21

## 2025-02-19 RX ORDER — FAMOTIDINE 10 MG/ML
20 INJECTION, SOLUTION INTRAVENOUS
OUTPATIENT
Start: 2025-02-21

## 2025-02-19 RX ADMIN — LANREOTIDE ACETATE 120 MG: 120 INJECTION SUBCUTANEOUS at 13:46

## 2025-02-19 NOTE — PROGRESS NOTES
Pt arrives ambulatory for lanreotide Injection  Pt denies complaints or concerns  Injection complete without incident and patient discharged in stable condition  Next appt  3/19 MD appt followed by Injection

## 2025-03-10 ENCOUNTER — SOCIAL WORK (OUTPATIENT)
Dept: ONCOLOGY | Age: 55
End: 2025-03-10

## 2025-03-10 NOTE — PROGRESS NOTES
called insurance on behalf of patient and set up transportation for appointment on 3/19 @ 1:45 pm at [facility]. Patient updated.      Transportation details:  [facility]  [phone]   ~ 1:15 pm  Confirmation #[id number]  Call for ride home

## 2025-03-19 ENCOUNTER — HOSPITAL ENCOUNTER (OUTPATIENT)
Dept: INFUSION THERAPY | Age: 55
Discharge: HOME | End: 2025-03-19
Payer: MEDICARE

## 2025-03-19 DIAGNOSIS — D3A.8 PRIMARY PANCREATIC NEUROENDOCRINE TUMOR (HCC): Primary | ICD-10-CM

## 2025-03-19 LAB
ALBUMIN SERPL-MCNC: 4.4 G/DL (ref 3.5–5.2)
ALBUMIN/GLOB SERPL: 1.1 {RATIO} (ref 1–2.5)
ALP SERPL-CCNC: 191 U/L (ref 35–104)
ALT SERPL-CCNC: 47 U/L (ref 5–33)
ANION GAP SERPL CALCULATED.3IONS-SCNC: 12 MMOL/L (ref 9–17)
AST SERPL-CCNC: 41 U/L
BASOPHILS # BLD: 0 K/UL (ref 0–0.2)
BASOPHILS NFR BLD: 1 % (ref 0–2)
BILIRUB SERPL-MCNC: 0.5 MG/DL (ref 0.3–1.2)
BUN SERPL-MCNC: 22 MG/DL (ref 6–20)
CALCIUM SERPL-MCNC: 9.3 MG/DL (ref 8.6–10.4)
CHLORIDE SERPL-SCNC: 102 MMOL/L (ref 98–107)
CO2 SERPL-SCNC: 24 MMOL/L (ref 20–31)
CREAT SERPL-MCNC: 0.9 MG/DL (ref 0.5–0.9)
EOSINOPHIL # BLD: 0.2 K/UL (ref 0–0.4)
EOSINOPHILS RELATIVE PERCENT: 4 % (ref 1–4)
ERYTHROCYTE [DISTWIDTH] IN BLOOD BY AUTOMATED COUNT: 13.8 % (ref 12.5–15.4)
GFR, ESTIMATED: 75 ML/MIN/1.73M2
GLUCOSE SERPL-MCNC: 169 MG/DL (ref 70–99)
HCT VFR BLD AUTO: 38.2 % (ref 36–46)
HGB BLD-MCNC: 12.9 G/DL (ref 12–16)
LYMPHOCYTES NFR BLD: 0.8 K/UL (ref 1–4.8)
LYMPHOCYTES RELATIVE PERCENT: 15 % (ref 24–44)
MCH RBC QN AUTO: 31.1 PG (ref 26–34)
MCHC RBC AUTO-ENTMCNC: 33.7 G/DL (ref 31–37)
MCV RBC AUTO: 92.4 FL (ref 80–100)
MONOCYTES NFR BLD: 0.3 K/UL (ref 0.1–1.2)
MONOCYTES NFR BLD: 6 % (ref 2–11)
NEUTROPHILS NFR BLD: 74 % (ref 36–66)
NEUTS SEG NFR BLD: 4.1 K/UL (ref 1.8–7.7)
PLATELET # BLD AUTO: 239 K/UL (ref 140–450)
PMV BLD AUTO: 7 FL (ref 6–12)
POTASSIUM SERPL-SCNC: 4.7 MMOL/L (ref 3.7–5.3)
PROT SERPL-MCNC: 8.3 G/DL (ref 6.4–8.3)
RBC # BLD AUTO: 4.14 M/UL (ref 4–5.2)
SODIUM SERPL-SCNC: 138 MMOL/L (ref 135–144)
WBC OTHER # BLD: 5.5 K/UL (ref 3.5–11)

## 2025-03-19 PROCEDURE — 96372 THER/PROPH/DIAG INJ SC/IM: CPT

## 2025-03-19 PROCEDURE — 85025 COMPLETE CBC W/AUTO DIFF WBC: CPT

## 2025-03-19 PROCEDURE — 6360000002 HC RX W HCPCS: Performed by: INTERNAL MEDICINE

## 2025-03-19 PROCEDURE — 80053 COMPREHEN METABOLIC PANEL: CPT

## 2025-03-19 PROCEDURE — 36415 COLL VENOUS BLD VENIPUNCTURE: CPT

## 2025-03-19 RX ORDER — LANREOTIDE ACETATE 120 MG/.5ML
120 INJECTION SUBCUTANEOUS ONCE
OUTPATIENT
Start: 2025-04-16 | End: 2025-04-16

## 2025-03-19 RX ORDER — ACETAMINOPHEN 325 MG/1
650 TABLET ORAL
OUTPATIENT
Start: 2025-04-16

## 2025-03-19 RX ORDER — LANREOTIDE ACETATE 120 MG/.5ML
120 INJECTION SUBCUTANEOUS ONCE
Status: COMPLETED | OUTPATIENT
Start: 2025-03-19 | End: 2025-03-19

## 2025-03-19 RX ORDER — EPINEPHRINE 1 MG/ML
0.3 INJECTION, SOLUTION, CONCENTRATE INTRAVENOUS PRN
OUTPATIENT
Start: 2025-04-16

## 2025-03-19 RX ORDER — FAMOTIDINE 10 MG/ML
20 INJECTION, SOLUTION INTRAVENOUS
OUTPATIENT
Start: 2025-04-16

## 2025-03-19 RX ORDER — HYDROCORTISONE SODIUM SUCCINATE 100 MG/2ML
100 INJECTION INTRAMUSCULAR; INTRAVENOUS
OUTPATIENT
Start: 2025-04-16

## 2025-03-19 RX ORDER — ONDANSETRON 2 MG/ML
8 INJECTION INTRAMUSCULAR; INTRAVENOUS
OUTPATIENT
Start: 2025-04-16

## 2025-03-19 RX ORDER — ALBUTEROL SULFATE 90 UG/1
4 INHALANT RESPIRATORY (INHALATION) PRN
OUTPATIENT
Start: 2025-04-16

## 2025-03-19 RX ORDER — DIPHENHYDRAMINE HYDROCHLORIDE 50 MG/ML
50 INJECTION, SOLUTION INTRAMUSCULAR; INTRAVENOUS
OUTPATIENT
Start: 2025-04-16

## 2025-03-19 RX ORDER — SODIUM CHLORIDE 9 MG/ML
INJECTION, SOLUTION INTRAVENOUS CONTINUOUS
OUTPATIENT
Start: 2025-04-16

## 2025-03-19 RX ADMIN — LANREOTIDE ACETATE 120 MG: 120 INJECTION SUBCUTANEOUS at 14:16

## 2025-03-20 ENCOUNTER — TELEPHONE (OUTPATIENT)
Dept: ONCOLOGY | Age: 55
End: 2025-03-20

## 2025-03-20 NOTE — TELEPHONE ENCOUNTER
Name: Marilyn Campo  : 1970  MRN: [id number]    Oncology Navigation Follow-Up Note    Contact Type:  Telephone    Notes: Attempted to contact pt for f/u call, no answer, VM left encouraged to contact writer prn.  Will continue to follow.      Electronically signed by Jennifer Pleitez RN on 3/20/2025 at 9:17 AM

## 2025-04-07 ENCOUNTER — HOSPITAL ENCOUNTER (OUTPATIENT)
Dept: MAMMOGRAPHY | Age: 55
Discharge: HOME | End: 2025-04-09
Payer: MEDICARE

## 2025-04-07 VITALS — BODY MASS INDEX: 40.32 KG/M2 | WEIGHT: 200 LBS | HEIGHT: 59 IN

## 2025-04-07 DIAGNOSIS — Z12.31 ENCOUNTER FOR SCREENING MAMMOGRAM FOR BREAST CANCER: ICD-10-CM

## 2025-04-07 PROCEDURE — 77063 BREAST TOMOSYNTHESIS BI: CPT

## 2025-04-14 DIAGNOSIS — D3A.8 PRIMARY PANCREATIC NEUROENDOCRINE TUMOR (HCC): Primary | ICD-10-CM

## 2025-04-16 ENCOUNTER — TELEPHONE (OUTPATIENT)
Dept: ONCOLOGY | Age: 55
End: 2025-04-16

## 2025-04-16 ENCOUNTER — OFFICE VISIT (OUTPATIENT)
Dept: ONCOLOGY | Age: 55
End: 2025-04-16
Payer: MEDICARE

## 2025-04-16 ENCOUNTER — HOSPITAL ENCOUNTER (OUTPATIENT)
Dept: INFUSION THERAPY | Age: 55
Discharge: HOME | End: 2025-04-16
Payer: MEDICARE

## 2025-04-16 VITALS
WEIGHT: 199.8 LBS | SYSTOLIC BLOOD PRESSURE: 121 MMHG | DIASTOLIC BLOOD PRESSURE: 87 MMHG | BODY MASS INDEX: 40.28 KG/M2 | TEMPERATURE: 97.5 F | HEART RATE: 104 BPM | RESPIRATION RATE: 18 BRPM | OXYGEN SATURATION: 96 %

## 2025-04-16 DIAGNOSIS — D3A.8 PRIMARY PANCREATIC NEUROENDOCRINE TUMOR (HCC): Primary | ICD-10-CM

## 2025-04-16 DIAGNOSIS — K76.0 HEPATIC STEATOSIS: ICD-10-CM

## 2025-04-16 DIAGNOSIS — D3A.8 NEUROENDOCRINE TUMOR (HCC): Primary | ICD-10-CM

## 2025-04-16 LAB
ALBUMIN SERPL-MCNC: 4 G/DL (ref 3.5–5.2)
ALBUMIN/GLOB SERPL: 1.1 {RATIO} (ref 1–2.5)
ALP SERPL-CCNC: 164 U/L (ref 35–104)
ALT SERPL-CCNC: 34 U/L (ref 5–33)
ANION GAP SERPL CALCULATED.3IONS-SCNC: 11 MMOL/L (ref 9–17)
AST SERPL-CCNC: 31 U/L
BASOPHILS # BLD: 0.1 K/UL (ref 0–0.2)
BASOPHILS NFR BLD: 1 % (ref 0–2)
BILIRUB SERPL-MCNC: 0.4 MG/DL (ref 0.3–1.2)
BUN SERPL-MCNC: 19 MG/DL (ref 6–20)
CALCIUM SERPL-MCNC: 9.1 MG/DL (ref 8.6–10.4)
CHLORIDE SERPL-SCNC: 106 MMOL/L (ref 98–107)
CO2 SERPL-SCNC: 24 MMOL/L (ref 20–31)
CREAT SERPL-MCNC: 0.9 MG/DL (ref 0.5–0.9)
EOSINOPHIL # BLD: 0.3 K/UL (ref 0–0.4)
EOSINOPHILS RELATIVE PERCENT: 6 % (ref 1–4)
ERYTHROCYTE [DISTWIDTH] IN BLOOD BY AUTOMATED COUNT: 13.3 % (ref 12.5–15.4)
GFR, ESTIMATED: 75 ML/MIN/1.73M2
GLUCOSE SERPL-MCNC: 130 MG/DL (ref 70–99)
HCT VFR BLD AUTO: 35 % (ref 36–46)
HGB BLD-MCNC: 11.8 G/DL (ref 12–16)
LYMPHOCYTES NFR BLD: 0.7 K/UL (ref 1–4.8)
LYMPHOCYTES RELATIVE PERCENT: 15 % (ref 24–44)
MCH RBC QN AUTO: 31.4 PG (ref 26–34)
MCHC RBC AUTO-ENTMCNC: 33.8 G/DL (ref 31–37)
MCV RBC AUTO: 92.9 FL (ref 80–100)
MONOCYTES NFR BLD: 0.3 K/UL (ref 0.1–1.2)
MONOCYTES NFR BLD: 7 % (ref 2–11)
NEUTROPHILS NFR BLD: 71 % (ref 36–66)
NEUTS SEG NFR BLD: 3.1 K/UL (ref 1.8–7.7)
PLATELET # BLD AUTO: 216 K/UL (ref 140–450)
PMV BLD AUTO: 6.9 FL (ref 6–12)
POTASSIUM SERPL-SCNC: 3.8 MMOL/L (ref 3.7–5.3)
PROT SERPL-MCNC: 7.5 G/DL (ref 6.4–8.3)
RBC # BLD AUTO: 3.77 M/UL (ref 4–5.2)
SODIUM SERPL-SCNC: 141 MMOL/L (ref 135–144)
WBC OTHER # BLD: 4.4 K/UL (ref 3.5–11)

## 2025-04-16 PROCEDURE — 6360000002 HC RX W HCPCS: Performed by: INTERNAL MEDICINE

## 2025-04-16 PROCEDURE — 1036F TOBACCO NON-USER: CPT | Performed by: INTERNAL MEDICINE

## 2025-04-16 PROCEDURE — G8417 CALC BMI ABV UP PARAM F/U: HCPCS | Performed by: INTERNAL MEDICINE

## 2025-04-16 PROCEDURE — 3017F COLORECTAL CA SCREEN DOC REV: CPT | Performed by: INTERNAL MEDICINE

## 2025-04-16 PROCEDURE — 80053 COMPREHEN METABOLIC PANEL: CPT

## 2025-04-16 PROCEDURE — 99211 OFF/OP EST MAY X REQ PHY/QHP: CPT | Performed by: INTERNAL MEDICINE

## 2025-04-16 PROCEDURE — 36415 COLL VENOUS BLD VENIPUNCTURE: CPT

## 2025-04-16 PROCEDURE — G8427 DOCREV CUR MEDS BY ELIG CLIN: HCPCS | Performed by: INTERNAL MEDICINE

## 2025-04-16 PROCEDURE — 85025 COMPLETE CBC W/AUTO DIFF WBC: CPT

## 2025-04-16 PROCEDURE — 96372 THER/PROPH/DIAG INJ SC/IM: CPT

## 2025-04-16 PROCEDURE — 99215 OFFICE O/P EST HI 40 MIN: CPT | Performed by: INTERNAL MEDICINE

## 2025-04-16 RX ORDER — ALBUTEROL SULFATE 90 UG/1
4 INHALANT RESPIRATORY (INHALATION) PRN
OUTPATIENT
Start: 2025-05-14

## 2025-04-16 RX ORDER — FAMOTIDINE 10 MG/ML
20 INJECTION, SOLUTION INTRAVENOUS
OUTPATIENT
Start: 2025-05-14

## 2025-04-16 RX ORDER — ACETAMINOPHEN 325 MG/1
650 TABLET ORAL
OUTPATIENT
Start: 2025-05-14

## 2025-04-16 RX ORDER — SODIUM CHLORIDE 9 MG/ML
INJECTION, SOLUTION INTRAVENOUS CONTINUOUS
OUTPATIENT
Start: 2025-05-14

## 2025-04-16 RX ORDER — HYDROCORTISONE SODIUM SUCCINATE 100 MG/2ML
100 INJECTION INTRAMUSCULAR; INTRAVENOUS
OUTPATIENT
Start: 2025-05-14

## 2025-04-16 RX ORDER — EPINEPHRINE 1 MG/ML
0.3 INJECTION, SOLUTION, CONCENTRATE INTRAVENOUS PRN
OUTPATIENT
Start: 2025-05-14

## 2025-04-16 RX ORDER — LANREOTIDE ACETATE 120 MG/.5ML
120 INJECTION SUBCUTANEOUS ONCE
Status: COMPLETED | OUTPATIENT
Start: 2025-04-16 | End: 2025-04-16

## 2025-04-16 RX ORDER — ONDANSETRON 2 MG/ML
8 INJECTION INTRAMUSCULAR; INTRAVENOUS
OUTPATIENT
Start: 2025-05-14

## 2025-04-16 RX ORDER — LANREOTIDE ACETATE 120 MG/.5ML
120 INJECTION SUBCUTANEOUS ONCE
OUTPATIENT
Start: 2025-05-14 | End: 2025-05-14

## 2025-04-16 RX ORDER — DIPHENHYDRAMINE HYDROCHLORIDE 50 MG/ML
50 INJECTION, SOLUTION INTRAMUSCULAR; INTRAVENOUS
OUTPATIENT
Start: 2025-05-14

## 2025-04-16 RX ADMIN — LANREOTIDE ACETATE 120 MG: 120 INJECTION SUBCUTANEOUS at 15:01

## 2025-04-16 NOTE — PROGRESS NOTES
Arrived for somatuline injection. Tolerated w/o incident and left in stable condition. Returns 5/14 for tx and

## 2025-04-16 NOTE — PROGRESS NOTES
Marilyn Campo                                                                                                                  4/16/2025  MRN:   [id number]  YOB: 1970  PCP:                           Mauri Harmon MD  Referring Physician: No ref. provider found  Treating Physician Name: ADAMA SCHWAB MD      Reason for visit:  Chief Complaint   Patient presents with    Follow-up     Review status of disease     Other     Fatigue   UTI   Heartburn upon drinking    Accompanied by patient's family member    Current problems:  Well-differentiated, grade 1, pancreatic neuroendocrine tumor, clinical stage T2 N0 M0  Elevated gastrin (patient on Protonix)  SMA focal severe stenosis, chronic per CT scan  Multiple comorbidities including diabetes mellitus obesity, renal insufficiency and severe hepatic steatosis  Family history of leukemia     Active and recent treatments:  Patient medically unfit for surgery  Lanreotide-11/2022, ongoing  Status post SBRT-2/2025    Interim History:  Patient presents to the clinic for a follow-up visit and to discuss further treatment plan.  Patient complains of abdominal discomfort especially after eating fatty food.  She is accompanied by her cousins who have multiple questions.  Patient also has questions about CT scan that was done at [facility].  Patient on Protonix.    During this visit patient's allergy, social, medical, surgical history and medications were reviewed and updated.    Summary of Case/History:    Marilyn Campo a 55 y.o.female is a patient who presents to the clinic to establish care and for further work-up and evaluation.  Patient has multiple comorbidities including diabetes COPD hypertension morbid obesity.  Patient initially presented to the emergency department with complaints of right lower quadrant pain radiating to the back.  Work-up revealed acute kidney injury pyelonephritis.  Patient CT scan without contrast also showed a possible

## 2025-04-16 NOTE — TELEPHONE ENCOUNTER
Name: Marilyn Campo  : 1970  MRN: [id number]    Oncology Navigation Follow-Up Note    Contact Type:  Medical Oncology    Notes: [facility] for Dr. Mahan f/u & tx.  Met w/pt & pt's family prior to appt.  Pt denied questions/concerns.  Encouraged to contact writer prn.  Will continue to follow.      Electronically signed by Jennifer Pleitez RN on 2025 at 2:28 PM

## 2025-04-17 ENCOUNTER — TELEPHONE (OUTPATIENT)
Dept: ONCOLOGY | Age: 55
End: 2025-04-17
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
## 2025-04-18 ENCOUNTER — TELEPHONE (OUTPATIENT)
Dept: ONCOLOGY | Age: 55
End: 2025-04-18

## 2025-04-18 NOTE — TELEPHONE ENCOUNTER
set up ride through insurance provider for 4/25.  called patient and updated her. Patient also informed that  still working on report and patient confirmed son's date of birth and full name.  let message with [employer] requesting call back.     Transportation details:  [facility] [phone]   12:45pm  Confirmation [id number]  Return ride 2:45pm

## 2025-04-21 ENCOUNTER — SOCIAL WORK (OUTPATIENT)
Dept: ONCOLOGY | Age: 55
End: 2025-04-21

## 2025-04-21 NOTE — PROGRESS NOTES
left a message on patient's phone detailing food pantries she could connect with, such as [facility] available Wednesdays 9 am-1 pm, [facility] at [address] from 11 am - 12:30 pm on week days, and [facility] from 6 pm to 6:30 pm.  will follow up with patient if she is in need of other food pantry options.

## 2025-04-25 ENCOUNTER — HOSPITAL ENCOUNTER (OUTPATIENT)
Age: 55
Setting detail: THERAPIES SERIES
Discharge: HOME | End: 2025-04-25
Payer: MEDICARE

## 2025-04-25 PROCEDURE — 97161 PT EVAL LOW COMPLEX 20 MIN: CPT | Performed by: PHYSICAL THERAPIST

## 2025-04-25 NOTE — CONSULTS
Sitting [] [x] []    Skin Integrity [x] [] []    Pelvic alignment [] [x] [] Lumbar flexion in standing   Hip alignment [x] [] []    Knee alignment [x] [] []    Ankle alignment [x] [] []           NEUROLOGICAL       Reflexes [] [x] [] Decreased    Sensation [] [x] [] Hands are always numb   Bladder/Bowel [] [x] [] Does not control bladder at night   Coordination [] [x] [] Unable to move feet well   Fine motor coordination [] [x] [] Unable to  small items; if bend forward to  items, will often fall.   Tone [x] [] []    Clonus [x] [] []    Vision  [] [x] [] Both eyes have cataracts, left is worse   Cognition [] [x] [] Reports some cognitive issues   Tremors [x] [] []        ROM  Left ROM  Right Strength  Left Strength  Right   Shoulder Flexion 122 120 4 4   Shoulder Abduction  99  105 4 4   Shoulder ER  post head  post head 4 4   Shoulder IR  post back  post back 4 4   Elbow Flexion  WFL   WFL 4 4   Elbow Extension  WFL  WFL 4 4   Pronation  WFL  WFL  4  4   Supination  WFL  WFL  4  4   Wrist Flexion  WFL  WFL 4 4   Wrist Extension  WFL  WFL 4 4    WFL  WFL  4 4            ROM  Left ROM   Right Strength  Left Strength  Right   Hip Flexion 90 90 4- 4-   Hip Abduction  36  40 4 4   Hip Adduction  0  0 4 4   Hip Extension Lack 10 Lack 15  4 4   Knee Flexion  105  103 4- 4-   Knee Extension  lack 15  lack 25 4- 4-   Dorsiflexion   9  8 4 4   Plantar flexion  WFL  WFL 4 4   Inversion  WFL  WFL  4  4   Eversion WFL   WFL  4 4       Gait: no heel strike, toe off.  No knee flexion.  Lumbar flexion.      FUNCTION Level of assistance Comments/observations   Bed Mobility     -rolling indep    -sit to supine indep    -supine to sit indep    Transfers     -sit to stand indep    -sliding board NA    -pivot NA    Balance     -sitting static indep    - sitting dynamic indep    -standing static indep    - standing dynamic SBA    Ambulation Indep/SBA    W/C Mobility NA    [place]/Dress indep      Functional outcome

## 2025-05-08 ENCOUNTER — TELEPHONE (OUTPATIENT)
Age: 55
End: 2025-05-08

## 2025-05-08 NOTE — TELEPHONE ENCOUNTER
Patient called requesting ride for 5/14 appointment. Ride scheduled through insurance provider.     Transportation details:  [place] [phone]   1:15pm  Confirmation [id number]  Call for return ride

## 2025-05-14 ENCOUNTER — OFFICE VISIT (OUTPATIENT)
Age: 55
End: 2025-05-14
Payer: MEDICARE

## 2025-05-14 ENCOUNTER — TELEPHONE (OUTPATIENT)
Age: 55
End: 2025-05-14

## 2025-05-14 ENCOUNTER — HOSPITAL ENCOUNTER (OUTPATIENT)
Dept: INFUSION THERAPY | Age: 55
Discharge: HOME | End: 2025-05-14
Payer: MEDICARE

## 2025-05-14 VITALS
SYSTOLIC BLOOD PRESSURE: 115 MMHG | BODY MASS INDEX: 40.94 KG/M2 | WEIGHT: 203.1 LBS | RESPIRATION RATE: 18 BRPM | OXYGEN SATURATION: 98 % | DIASTOLIC BLOOD PRESSURE: 77 MMHG | TEMPERATURE: 97.8 F | HEART RATE: 73 BPM

## 2025-05-14 DIAGNOSIS — Z79.899 HIGH RISK MEDICATION USE: ICD-10-CM

## 2025-05-14 DIAGNOSIS — K76.0 HEPATIC STEATOSIS: ICD-10-CM

## 2025-05-14 DIAGNOSIS — D3A.8 NEUROENDOCRINE TUMOR (HCC): ICD-10-CM

## 2025-05-14 DIAGNOSIS — D3A.8 PRIMARY PANCREATIC NEUROENDOCRINE TUMOR (HCC): Primary | ICD-10-CM

## 2025-05-14 LAB
ALBUMIN SERPL-MCNC: 4.1 G/DL (ref 3.5–5.2)
ALBUMIN/GLOB SERPL: 1.5 {RATIO} (ref 1–2.5)
ALP SERPL-CCNC: 157 U/L (ref 35–104)
ALT SERPL-CCNC: 40 U/L (ref 10–35)
ANION GAP SERPL CALCULATED.3IONS-SCNC: 13 MMOL/L (ref 9–16)
AST SERPL-CCNC: 36 U/L (ref 10–35)
BASOPHILS # BLD: 0 K/UL (ref 0–0.2)
BASOPHILS NFR BLD: 1 % (ref 0–2)
BILIRUB SERPL-MCNC: 0.4 MG/DL (ref 0–1.2)
BUN SERPL-MCNC: 14 MG/DL (ref 6–20)
CALCIUM SERPL-MCNC: 9.4 MG/DL (ref 8.6–10.4)
CHLORIDE SERPL-SCNC: 106 MMOL/L (ref 98–107)
CO2 SERPL-SCNC: 20 MMOL/L (ref 20–31)
CREAT SERPL-MCNC: 1 MG/DL (ref 0.6–0.9)
EOSINOPHIL # BLD: 0.2 K/UL (ref 0–0.4)
EOSINOPHILS RELATIVE PERCENT: 4 % (ref 1–4)
ERYTHROCYTE [DISTWIDTH] IN BLOOD BY AUTOMATED COUNT: 13.4 % (ref 12.5–15.4)
GFR, ESTIMATED: 66 ML/MIN/1.73M2
GLUCOSE SERPL-MCNC: 273 MG/DL (ref 74–99)
HCT VFR BLD AUTO: 33 % (ref 36–46)
HGB BLD-MCNC: 11.1 G/DL (ref 12–16)
LYMPHOCYTES NFR BLD: 0.8 K/UL (ref 1–4.8)
LYMPHOCYTES RELATIVE PERCENT: 19 % (ref 24–44)
MCH RBC QN AUTO: 31.5 PG (ref 26–34)
MCHC RBC AUTO-ENTMCNC: 33.6 G/DL (ref 31–37)
MCV RBC AUTO: 93.8 FL (ref 80–100)
MONOCYTES NFR BLD: 0.2 K/UL (ref 0.1–1.2)
MONOCYTES NFR BLD: 6 % (ref 2–11)
NEUTROPHILS NFR BLD: 70 % (ref 36–66)
NEUTS SEG NFR BLD: 2.9 K/UL (ref 1.8–7.7)
PLATELET # BLD AUTO: 187 K/UL (ref 140–450)
PMV BLD AUTO: 7.6 FL (ref 6–12)
POTASSIUM SERPL-SCNC: 3.6 MMOL/L (ref 3.7–5.3)
PROT SERPL-MCNC: 6.9 G/DL (ref 6.6–8.7)
RBC # BLD AUTO: 3.52 M/UL (ref 4–5.2)
SODIUM SERPL-SCNC: 139 MMOL/L (ref 136–145)
WBC OTHER # BLD: 4.1 K/UL (ref 3.5–11)

## 2025-05-14 PROCEDURE — G8428 CUR MEDS NOT DOCUMENT: HCPCS | Performed by: INTERNAL MEDICINE

## 2025-05-14 PROCEDURE — G8417 CALC BMI ABV UP PARAM F/U: HCPCS | Performed by: INTERNAL MEDICINE

## 2025-05-14 PROCEDURE — 6360000002 HC RX W HCPCS: Performed by: INTERNAL MEDICINE

## 2025-05-14 PROCEDURE — 36415 COLL VENOUS BLD VENIPUNCTURE: CPT

## 2025-05-14 PROCEDURE — 1036F TOBACCO NON-USER: CPT | Performed by: INTERNAL MEDICINE

## 2025-05-14 PROCEDURE — 96372 THER/PROPH/DIAG INJ SC/IM: CPT

## 2025-05-14 PROCEDURE — 80053 COMPREHEN METABOLIC PANEL: CPT

## 2025-05-14 PROCEDURE — 99214 OFFICE O/P EST MOD 30 MIN: CPT | Performed by: INTERNAL MEDICINE

## 2025-05-14 PROCEDURE — 3017F COLORECTAL CA SCREEN DOC REV: CPT | Performed by: INTERNAL MEDICINE

## 2025-05-14 PROCEDURE — 85025 COMPLETE CBC W/AUTO DIFF WBC: CPT

## 2025-05-14 RX ORDER — SODIUM CHLORIDE 9 MG/ML
INJECTION, SOLUTION INTRAVENOUS CONTINUOUS
OUTPATIENT
Start: 2025-06-11

## 2025-05-14 RX ORDER — DIPHENHYDRAMINE HYDROCHLORIDE 50 MG/ML
50 INJECTION, SOLUTION INTRAMUSCULAR; INTRAVENOUS
OUTPATIENT
Start: 2025-06-11

## 2025-05-14 RX ORDER — LANREOTIDE ACETATE 120 MG/.5ML
120 INJECTION SUBCUTANEOUS ONCE
Status: COMPLETED | OUTPATIENT
Start: 2025-05-14 | End: 2025-05-14

## 2025-05-14 RX ORDER — FAMOTIDINE 10 MG/ML
20 INJECTION, SOLUTION INTRAVENOUS
OUTPATIENT
Start: 2025-06-11

## 2025-05-14 RX ORDER — ACETAMINOPHEN 325 MG/1
650 TABLET ORAL
OUTPATIENT
Start: 2025-06-11

## 2025-05-14 RX ORDER — ALBUTEROL SULFATE 90 UG/1
4 INHALANT RESPIRATORY (INHALATION) PRN
OUTPATIENT
Start: 2025-06-11

## 2025-05-14 RX ORDER — ONDANSETRON 2 MG/ML
8 INJECTION INTRAMUSCULAR; INTRAVENOUS
OUTPATIENT
Start: 2025-06-11

## 2025-05-14 RX ORDER — HYDROCORTISONE SODIUM SUCCINATE 100 MG/2ML
100 INJECTION INTRAMUSCULAR; INTRAVENOUS
OUTPATIENT
Start: 2025-06-11

## 2025-05-14 RX ORDER — LANREOTIDE ACETATE 120 MG/.5ML
120 INJECTION SUBCUTANEOUS ONCE
OUTPATIENT
Start: 2025-06-11 | End: 2025-06-11

## 2025-05-14 RX ORDER — EPINEPHRINE 1 MG/ML
0.3 INJECTION, SOLUTION, CONCENTRATE INTRAVENOUS PRN
OUTPATIENT
Start: 2025-06-11

## 2025-05-14 RX ADMIN — LANREOTIDE ACETATE 120 MG: 120 INJECTION SUBCUTANEOUS at 15:59

## 2025-05-14 NOTE — PROGRESS NOTES
Marilyn Campo                                                                                                                  5/14/2025  MRN:   [id number]  YOB: 1970  PCP:                           Mauri Harmon MD  Referring Physician: No ref. provider found  Treating Physician Name: ADAMA SCHWAB MD      Reason for visit:  Chief Complaint   Patient presents with    Follow-up     Review status of disease    Accompanied by patient's family member    Current problems:  Well-differentiated, grade 1, pancreatic neuroendocrine tumor, clinical stage T2 N0 M0  Elevated gastrin (patient on Protonix)  SMA focal severe stenosis, chronic per CT scan  Multiple comorbidities including diabetes mellitus obesity, renal insufficiency and severe hepatic steatosis  Family history of leukemia     Active and recent treatments:  Patient medically unfit for surgery  Lanreotide-11/2022, ongoing  Status post SBRT-2/2025    Interim History:  History of Present Illness  The patient presents for treatment for patient's neuroendocrine tumor.  And lanreotide administration    She is due for her routine injection today. She has been adhering to her prescribed antidiabetic medication regimen and has made dietary modifications, including reducing her intake of carbonated beverages and sweets. Physical activity has been incorporated into her routine, specifically walking. However, she experiences fatigue after prolonged periods of walking, necessitating the use of an electric wheelchair for extended distances.    She has not been taking her blood pressure medication because she is afraid it might lower her blood pressure too much. She reports experiencing severe knee pain and muscle cramps on her right side.    She is scheduled to undergo laser surgery for cataracts.    During this visit patient's allergy, social, medical, surgical history and medications were reviewed and updated.    Summary of Case/History:    Marilyn MERRILL

## 2025-05-14 NOTE — TELEPHONE ENCOUNTER
Instructions   from Dr. Albaro Mahan MD    Tx as planned  Rv in 2 months       Tx as planned  RV 7/16/25 at 2 pm

## 2025-05-14 NOTE — PROGRESS NOTES
Pt here for lanreotide.  Arrives ambulatory.  Denies any new complaints.  Tx complete without incident.  Pt d/c'd in stable condition.  Returns 6/11/25 for lanreotide.

## 2025-06-03 ENCOUNTER — HOSPITAL ENCOUNTER (OUTPATIENT)
Dept: CT IMAGING | Age: 55
Discharge: HOME | End: 2025-06-05
Attending: RADIOLOGY
Payer: MEDICARE

## 2025-06-03 DIAGNOSIS — D3A.8 NEUROENDOCRINE TUMOR (HCC): ICD-10-CM

## 2025-06-03 PROCEDURE — 6360000004 HC RX CONTRAST MEDICATION: Performed by: RADIOLOGY

## 2025-06-03 PROCEDURE — 2580000003 HC RX 258: Performed by: RADIOLOGY

## 2025-06-03 PROCEDURE — 2500000003 HC RX 250 WO HCPCS: Performed by: RADIOLOGY

## 2025-06-03 PROCEDURE — 74177 CT ABD & PELVIS W/CONTRAST: CPT

## 2025-06-03 RX ORDER — SODIUM CHLORIDE 0.9 % (FLUSH) 0.9 %
10 SYRINGE (ML) INJECTION ONCE
Status: COMPLETED | OUTPATIENT
Start: 2025-06-03 | End: 2025-06-03

## 2025-06-03 RX ORDER — 0.9 % SODIUM CHLORIDE 0.9 %
80 INTRAVENOUS SOLUTION INTRAVENOUS ONCE
Status: COMPLETED | OUTPATIENT
Start: 2025-06-03 | End: 2025-06-03

## 2025-06-03 RX ORDER — IOPAMIDOL 755 MG/ML
100 INJECTION, SOLUTION INTRAVASCULAR
Status: COMPLETED | OUTPATIENT
Start: 2025-06-03 | End: 2025-06-03

## 2025-06-03 RX ADMIN — IOPAMIDOL 100 ML: 755 INJECTION, SOLUTION INTRAVENOUS at 13:20

## 2025-06-03 RX ADMIN — SODIUM CHLORIDE, PRESERVATIVE FREE 10 ML: 5 INJECTION INTRAVENOUS at 13:20

## 2025-06-03 RX ADMIN — SODIUM CHLORIDE 80 ML: 9 INJECTION, SOLUTION INTRAVENOUS at 13:20

## 2025-06-11 ENCOUNTER — TELEPHONE (OUTPATIENT)
Age: 55
End: 2025-06-11

## 2025-06-11 ENCOUNTER — HOSPITAL ENCOUNTER (OUTPATIENT)
Dept: INFUSION THERAPY | Age: 55
End: 2025-06-11

## 2025-06-11 NOTE — TELEPHONE ENCOUNTER
received missed call from patient.  called back and left message.     Patient returned call and requested ride scheduled for appointment 6/13.  states he will schedule. Upon chart review patient scheduled for appointments today and tomorrow but not on Friday.  called patient and left a message requesting return call.

## 2025-06-12 ENCOUNTER — TELEPHONE (OUTPATIENT)
Age: 55
End: 2025-06-12

## 2025-06-12 NOTE — TELEPHONE ENCOUNTER
set up ride for 6/16 appointment through insurance provider. Message left with patient.     Transportation details:  [place] [phone]   12:30pm  Confirmation #[id number]  Call for return ride     Detail Level: Zone Detail Level: Simple

## 2025-06-13 ENCOUNTER — TELEPHONE (OUTPATIENT)
Age: 55
End: 2025-06-13

## 2025-06-13 NOTE — TELEPHONE ENCOUNTER
Name: Marilyn Campo  : 1970  MRN: [id number]    Oncology Navigation Follow-Up Note    Contact Type:  Telephone    Notes: Upon review of chart noted pt no show for  Dr. Butterfield f/u.  Noted 6/3 CT a/p results showing new 1.8 cm LLL mass.  Noted pt scheduled  @ [facility] for lanreotide injection @ 1330.  Attempted to contact pt, no answer, VM left requested pt stop @ [facility] RO after injection to reschedule RO f/u.  Dr. Butterfield updated.  Dr. Butterfield stated okay for pt to see Dr. Mcknight if available  to discuss CT imaging results.  Dr. Mahan updated.  Will continue to follow.      Electronically signed by Jennifer Pleitez RN on 2025 at 4:05 PM

## 2025-06-16 ENCOUNTER — HOSPITAL ENCOUNTER (OUTPATIENT)
Dept: INFUSION THERAPY | Age: 55
End: 2025-06-16

## 2025-06-17 ENCOUNTER — TELEPHONE (OUTPATIENT)
Age: 55
End: 2025-06-17

## 2025-06-17 NOTE — TELEPHONE ENCOUNTER
received update that patient rescheduled appointment. Ride scheduled through insurance provider for 6/20.     Transportation details:  [place] [phone]   12:30pm  Confirmation #[id number]  Call for return ride

## 2025-06-20 ENCOUNTER — TELEPHONE (OUTPATIENT)
Age: 55
End: 2025-06-20

## 2025-06-20 ENCOUNTER — HOSPITAL ENCOUNTER (OUTPATIENT)
Dept: INFUSION THERAPY | Age: 55
Discharge: HOME | End: 2025-06-20
Payer: MEDICARE

## 2025-06-20 ENCOUNTER — HOSPITAL ENCOUNTER (OUTPATIENT)
Dept: RADIATION ONCOLOGY | Age: 55
Discharge: HOME | End: 2025-06-20
Payer: MEDICARE

## 2025-06-20 VITALS
TEMPERATURE: 97.9 F | HEART RATE: 77 BPM | SYSTOLIC BLOOD PRESSURE: 149 MMHG | OXYGEN SATURATION: 100 % | BODY MASS INDEX: 40.99 KG/M2 | DIASTOLIC BLOOD PRESSURE: 88 MMHG | WEIGHT: 203.3 LBS | RESPIRATION RATE: 16 BRPM

## 2025-06-20 DIAGNOSIS — D3A.8 PRIMARY PANCREATIC NEUROENDOCRINE TUMOR (HCC): Primary | ICD-10-CM

## 2025-06-20 DIAGNOSIS — R91.1 LUNG NODULE: Primary | ICD-10-CM

## 2025-06-20 LAB
ALBUMIN SERPL-MCNC: 4.1 G/DL (ref 3.5–5.2)
ALBUMIN/GLOB SERPL: 1.3 {RATIO} (ref 1–2.5)
ALP SERPL-CCNC: 187 U/L (ref 35–104)
ALT SERPL-CCNC: 37 U/L (ref 10–35)
ANION GAP SERPL CALCULATED.3IONS-SCNC: 11 MMOL/L (ref 9–16)
AST SERPL-CCNC: 36 U/L (ref 10–35)
BASOPHILS # BLD: 0.1 K/UL (ref 0–0.2)
BASOPHILS NFR BLD: 1 % (ref 0–2)
BILIRUB SERPL-MCNC: 0.4 MG/DL (ref 0–1.2)
BUN SERPL-MCNC: 16 MG/DL (ref 6–20)
CALCIUM SERPL-MCNC: 8.9 MG/DL (ref 8.6–10.4)
CHLORIDE SERPL-SCNC: 107 MMOL/L (ref 98–107)
CO2 SERPL-SCNC: 22 MMOL/L (ref 20–31)
CREAT SERPL-MCNC: 0.8 MG/DL (ref 0.6–0.9)
EOSINOPHIL # BLD: 0.4 K/UL (ref 0–0.4)
EOSINOPHILS RELATIVE PERCENT: 8 % (ref 1–4)
ERYTHROCYTE [DISTWIDTH] IN BLOOD BY AUTOMATED COUNT: 12.9 % (ref 12.5–15.4)
GFR, ESTIMATED: 86 ML/MIN/1.73M2
GLUCOSE SERPL-MCNC: 160 MG/DL (ref 74–99)
HCT VFR BLD AUTO: 34.3 % (ref 36–46)
HGB BLD-MCNC: 11.8 G/DL (ref 12–16)
LYMPHOCYTES NFR BLD: 0.7 K/UL (ref 1–4.8)
LYMPHOCYTES RELATIVE PERCENT: 15 % (ref 24–44)
MCH RBC QN AUTO: 32.2 PG (ref 26–34)
MCHC RBC AUTO-ENTMCNC: 34.6 G/DL (ref 31–37)
MCV RBC AUTO: 93.2 FL (ref 80–100)
MONOCYTES NFR BLD: 0.2 K/UL (ref 0.1–1.2)
MONOCYTES NFR BLD: 4 % (ref 2–11)
NEUTROPHILS NFR BLD: 72 % (ref 36–66)
NEUTS SEG NFR BLD: 3.2 K/UL (ref 1.8–7.7)
PLATELET # BLD AUTO: 206 K/UL (ref 140–450)
PMV BLD AUTO: 7 FL (ref 6–12)
POTASSIUM SERPL-SCNC: 3.5 MMOL/L (ref 3.7–5.3)
PROT SERPL-MCNC: 7.3 G/DL (ref 6.6–8.7)
RBC # BLD AUTO: 3.68 M/UL (ref 4–5.2)
SODIUM SERPL-SCNC: 140 MMOL/L (ref 136–145)
WBC OTHER # BLD: 4.5 K/UL (ref 3.5–11)

## 2025-06-20 PROCEDURE — 80053 COMPREHEN METABOLIC PANEL: CPT

## 2025-06-20 PROCEDURE — 96372 THER/PROPH/DIAG INJ SC/IM: CPT

## 2025-06-20 PROCEDURE — 36415 COLL VENOUS BLD VENIPUNCTURE: CPT

## 2025-06-20 PROCEDURE — 99212 OFFICE O/P EST SF 10 MIN: CPT | Performed by: RADIOLOGY

## 2025-06-20 PROCEDURE — 85025 COMPLETE CBC W/AUTO DIFF WBC: CPT

## 2025-06-20 PROCEDURE — 6360000002 HC RX W HCPCS: Performed by: INTERNAL MEDICINE

## 2025-06-20 RX ORDER — ACETAMINOPHEN 325 MG/1
650 TABLET ORAL
OUTPATIENT
Start: 2025-07-11

## 2025-06-20 RX ORDER — ALBUTEROL SULFATE 90 UG/1
4 INHALANT RESPIRATORY (INHALATION) PRN
OUTPATIENT
Start: 2025-07-11

## 2025-06-20 RX ORDER — HYDROCORTISONE SODIUM SUCCINATE 100 MG/2ML
100 INJECTION INTRAMUSCULAR; INTRAVENOUS
OUTPATIENT
Start: 2025-07-11

## 2025-06-20 RX ORDER — DIPHENHYDRAMINE HYDROCHLORIDE 50 MG/ML
50 INJECTION, SOLUTION INTRAMUSCULAR; INTRAVENOUS
OUTPATIENT
Start: 2025-07-11

## 2025-06-20 RX ORDER — LANREOTIDE ACETATE 120 MG/.5ML
120 INJECTION SUBCUTANEOUS ONCE
Status: COMPLETED | OUTPATIENT
Start: 2025-06-20 | End: 2025-06-20

## 2025-06-20 RX ORDER — ONDANSETRON 2 MG/ML
8 INJECTION INTRAMUSCULAR; INTRAVENOUS
OUTPATIENT
Start: 2025-07-11

## 2025-06-20 RX ORDER — SODIUM CHLORIDE 9 MG/ML
INJECTION, SOLUTION INTRAVENOUS CONTINUOUS
OUTPATIENT
Start: 2025-07-11

## 2025-06-20 RX ORDER — EPINEPHRINE 1 MG/ML
0.3 INJECTION, SOLUTION, CONCENTRATE INTRAVENOUS PRN
OUTPATIENT
Start: 2025-07-11

## 2025-06-20 RX ORDER — FAMOTIDINE 10 MG/ML
20 INJECTION, SOLUTION INTRAVENOUS
OUTPATIENT
Start: 2025-07-11

## 2025-06-20 RX ORDER — LANREOTIDE ACETATE 120 MG/.5ML
120 INJECTION SUBCUTANEOUS ONCE
OUTPATIENT
Start: 2025-07-11 | End: 2025-07-11

## 2025-06-20 RX ADMIN — LANREOTIDE ACETATE 120 MG: 120 INJECTION SUBCUTANEOUS at 14:01

## 2025-06-20 NOTE — PROGRESS NOTES
Arrived for somatuline injection. Tolerated w/o incident and left in stable condition. Returns 7/16

## 2025-06-20 NOTE — TELEPHONE ENCOUNTER
Name: Marilyn Campo  : 1970  MRN: [id number]    Oncology Navigation Follow-Up Note    Contact Type:  Radiation Oncology    Notes: Pt @ [facility] for Dr. Butterfield f/u.  Met w/pt prior to f/u.  Pt c/o fatigue & denied questions/concerns.  Encouraged to contact writer prn.  Will continue to follow.      Electronically signed by Jennifer Pleitez RN on 2025 at 2:27 PM

## 2025-06-20 NOTE — PROGRESS NOTES
[facility]            Radiation Oncology          [address] [place] [address]        O: [phone]        F: [phone]       [email or web address]           Date of Service: 2025     Location:  [facility] Radiation Oncology,   [address] [place] [id number] [phone]       RADIATION ONCOLOGY FOLLOW UP NOTE    Patient ID:   Marilyn Campo  : 1970   MRN: [id number]    DIAGNOSIS:  Well-differentiated neuroendocrine carcinoma of the pancreatic head T2 N0 M0     HISTORY OF PRESENT ILLNESS:   Marilyn Campo is a 55 y.o. female with a known history of well-differentiated neuroendocrine pancreas, disease is localized to the pancreatic head without evidence of metastatic disease on imaging.  Patient is not a surgical candidate due to comorbidities.  She has been on lanreotide, however recent imaging including CT chest abdomen pelvis shows increase in the pancreatic head mass measuring currently 3.5 cm in the greatest dimension without evidence of other sites of metastatic disease.  Given the enlargement of the mass, and the patient not being a surgical candidate, she is referred for consideration of radiation therapy.  She completed her RT course on 2/10/2025.  She is here for a posttreatment follow-up.  She is doing well.  Denies abdominal pain, nausea, vomiting.  She had a recent CT chest, abdomen and pelvis which shows decrease in the size of the treated pancreatic head mass.  Note was made of a new 1.8 cm lung nodule.  Of note patient has a prior long history of tobacco usage.    MEDICATIONS:    Current Outpatient Medications:     LANTUS SOLOSTAR 100 UNIT/ML injection pen, INJECT 20 UNITS UNDER THE SKIN DAILY, Disp: 15 mL, Rfl: 1    pantoprazole (PROTONIX) 40 MG tablet, Take 2 tablets by mouth 2 times daily (before meals), Disp: 60 tablet, Rfl: 5    glimepiride (AMARYL) 4 MG tablet, , Disp: , Rfl:     loratadine (CLARITIN) 10 MG

## 2025-06-20 NOTE — PROGRESS NOTES
Marilyn Campo  6/20/2025  2:50 PM      Vitals:    06/20/25 1415   BP: (!) 149/88   Pulse: 77   Resp: 16   Temp: 97.9 °F (36.6 °C)   SpO2: 100%    :     Pain Assessment: None - Denies Pain          Wt Readings from Last 1 Encounters:   06/20/25 92.2 kg (203 lb 4.8 oz)                Current Outpatient Medications:     LANTUS SOLOSTAR 100 UNIT/ML injection pen, INJECT 20 UNITS UNDER THE SKIN DAILY, Disp: 15 mL, Rfl: 1    pantoprazole (PROTONIX) 40 MG tablet, Take 2 tablets by mouth 2 times daily (before meals), Disp: 60 tablet, Rfl: 5    glimepiride (AMARYL) 4 MG tablet, , Disp: , Rfl:     loratadine (CLARITIN) 10 MG tablet, Take 1 tablet by mouth daily, Disp: , Rfl:     oxybutynin (DITROPAN) 5 MG tablet, Take 1 tablet by mouth 2 times daily, Disp: , Rfl:     glipiZIDE (GLUCOTROL) 5 MG tablet, Take 1 tablet by mouth 2 times daily (before meals), Disp: 60 tablet, Rfl: 0    DULoxetine (CYMBALTA) 30 MG extended release capsule, Take 1 capsule by mouth daily, Disp: , Rfl:     aspirin 81 MG chewable tablet, Take 1 tablet by mouth daily, Disp: , Rfl:     lisinopril-hydroCHLOROthiazide (PRINZIDE;ZESTORETIC) 20-12.5 MG per tablet, take 1 tablet by mouth once daily (Patient not taking: Reported on 5/14/2025), Disp: , Rfl:     levothyroxine (SYNTHROID) 100 MCG tablet, Take 1 tablet by mouth Daily, Disp: , Rfl:     simvastatin (ZOCOR) 40 MG tablet, Take 1 tablet by mouth nightly, Disp: , Rfl:     acetaminophen (TYLENOL) 325 MG tablet, Take 2 tablets by mouth every 6 hours as needed for Pain, Disp: , Rfl:     benzonatate (TESSALON) 100 MG capsule, Take 1 capsule by mouth 3 times daily as needed for Cough (Patient not taking: Reported on 11/3/2023), Disp: 20 capsule, Rfl: 0        FALLS RISK SCREEN  Instructions:  Assess the patient and enter the appropriate indicators that are present for fall risk identification.   Total the numbers entered and assign a fall risk score from Table 2.  Reassess patient at a minimum every 12 weeks

## 2025-06-24 ENCOUNTER — TELEPHONE (OUTPATIENT)
Age: 55
End: 2025-06-24

## 2025-06-24 NOTE — TELEPHONE ENCOUNTER
set up ride through insurance provider for 6/30. Message left with patient.    Transportation details:  [place] [phone]   11:30am  Confirmation #[id number]  Call for return ride

## 2025-06-30 ENCOUNTER — HOSPITAL ENCOUNTER (OUTPATIENT)
Dept: NUCLEAR MEDICINE | Age: 55
Discharge: HOME | End: 2025-07-02
Attending: RADIOLOGY
Payer: MEDICARE

## 2025-06-30 DIAGNOSIS — R91.1 LUNG NODULE: ICD-10-CM

## 2025-06-30 LAB — GLUCOSE BLD-MCNC: 99 MG/DL (ref 65–105)

## 2025-06-30 PROCEDURE — 3430000000 HC RX DIAGNOSTIC RADIOPHARMACEUTICAL: Performed by: RADIOLOGY

## 2025-06-30 PROCEDURE — 2500000003 HC RX 250 WO HCPCS: Performed by: RADIOLOGY

## 2025-06-30 PROCEDURE — 82947 ASSAY GLUCOSE BLOOD QUANT: CPT

## 2025-06-30 PROCEDURE — 78815 PET IMAGE W/CT SKULL-THIGH: CPT

## 2025-06-30 PROCEDURE — A9609 HC RX DIAGNOSTIC RADIOPHARMACEUTICAL: HCPCS | Performed by: RADIOLOGY

## 2025-06-30 RX ORDER — FLUDEOXYGLUCOSE F 18 200 MCI/ML
10 INJECTION, SOLUTION INTRAVENOUS
Status: COMPLETED | OUTPATIENT
Start: 2025-06-30 | End: 2025-06-30

## 2025-06-30 RX ORDER — SODIUM CHLORIDE 0.9 % (FLUSH) 0.9 %
10 SYRINGE (ML) INJECTION ONCE
Status: COMPLETED | OUTPATIENT
Start: 2025-06-30 | End: 2025-06-30

## 2025-06-30 RX ADMIN — FLUDEOXYGLUCOSE F 18 13.04 MILLICURIE: 200 INJECTION, SOLUTION INTRAVENOUS at 12:31

## 2025-06-30 RX ADMIN — SODIUM CHLORIDE, PRESERVATIVE FREE 10 ML: 5 INJECTION INTRAVENOUS at 12:31

## 2025-07-03 ENCOUNTER — TELEPHONE (OUTPATIENT)
Age: 55
End: 2025-07-03

## 2025-07-03 NOTE — TELEPHONE ENCOUNTER
received call form Medical Assistant stating patient called in requesting ride for 7/7. No appointments scheduled. Appointments on 7/11 and 7/16 upcoming.  will schedule rides.

## 2025-07-07 ENCOUNTER — TELEPHONE (OUTPATIENT)
Age: 55
End: 2025-07-07

## 2025-07-07 ENCOUNTER — TELEPHONE (OUTPATIENT)
Dept: RADIATION ONCOLOGY | Age: 55
End: 2025-07-07

## 2025-07-07 NOTE — TELEPHONE ENCOUNTER
Patient called to cancel/leti follow up with Dr. Butterfield on 7/11/25.  Patient states she has another appointment that she can't miss and needs to r/s.  Patient r/s 7/18/25 @330 with Dr. Butterfield.

## 2025-07-07 NOTE — TELEPHONE ENCOUNTER
received updates on patient's schedule.  set up ride for 7/18 through insurance provider. Patient updated.    Transportation details:  [place] [phone]   1:30pm  Confirmation #[id number]  Call for return ride

## 2025-07-16 ENCOUNTER — APPOINTMENT (OUTPATIENT)
Dept: INFUSION THERAPY | Age: 55
End: 2025-07-16
Payer: MEDICARE

## 2025-07-18 ENCOUNTER — HOSPITAL ENCOUNTER (OUTPATIENT)
Dept: INFUSION THERAPY | Age: 55
Discharge: HOME | End: 2025-07-18
Payer: MEDICARE

## 2025-07-18 ENCOUNTER — HOSPITAL ENCOUNTER (OUTPATIENT)
Dept: RADIATION ONCOLOGY | Age: 55
Discharge: HOME | End: 2025-07-18
Payer: MEDICARE

## 2025-07-18 ENCOUNTER — OFFICE VISIT (OUTPATIENT)
Age: 55
End: 2025-07-18

## 2025-07-18 ENCOUNTER — TELEPHONE (OUTPATIENT)
Age: 55
End: 2025-07-18

## 2025-07-18 VITALS
OXYGEN SATURATION: 97 % | BODY MASS INDEX: 40.94 KG/M2 | WEIGHT: 203.1 LBS | SYSTOLIC BLOOD PRESSURE: 135 MMHG | DIASTOLIC BLOOD PRESSURE: 83 MMHG | RESPIRATION RATE: 18 BRPM | HEART RATE: 72 BPM | TEMPERATURE: 97.6 F

## 2025-07-18 VITALS
WEIGHT: 203.1 LBS | BODY MASS INDEX: 40.94 KG/M2 | SYSTOLIC BLOOD PRESSURE: 135 MMHG | DIASTOLIC BLOOD PRESSURE: 83 MMHG | TEMPERATURE: 97.6 F | OXYGEN SATURATION: 98 % | HEART RATE: 72 BPM

## 2025-07-18 DIAGNOSIS — Z79.899 HIGH RISK MEDICATION USE: ICD-10-CM

## 2025-07-18 DIAGNOSIS — D3A.8 PRIMARY PANCREATIC NEUROENDOCRINE TUMOR (HCC): Primary | ICD-10-CM

## 2025-07-18 DIAGNOSIS — R91.1 LUNG NODULE: ICD-10-CM

## 2025-07-18 DIAGNOSIS — K76.0 HEPATIC STEATOSIS: ICD-10-CM

## 2025-07-18 DIAGNOSIS — D3A.8 PRIMARY PANCREATIC NEUROENDOCRINE TUMOR (HCC): ICD-10-CM

## 2025-07-18 LAB
ALBUMIN SERPL-MCNC: 4.3 G/DL (ref 3.5–5.2)
ALBUMIN/GLOB SERPL: 1.3 {RATIO} (ref 1–2.5)
ALP SERPL-CCNC: 172 U/L (ref 35–104)
ALT SERPL-CCNC: 34 U/L (ref 5–33)
ANION GAP SERPL CALCULATED.3IONS-SCNC: 11 MMOL/L (ref 9–17)
AST SERPL-CCNC: 31 U/L
BASOPHILS # BLD: 0.1 K/UL (ref 0–0.2)
BASOPHILS NFR BLD: 1 % (ref 0–2)
BILIRUB SERPL-MCNC: 0.4 MG/DL (ref 0.3–1.2)
BUN SERPL-MCNC: 15 MG/DL (ref 6–20)
CALCIUM SERPL-MCNC: 8.8 MG/DL (ref 8.6–10.4)
CHLORIDE SERPL-SCNC: 104 MMOL/L (ref 98–107)
CO2 SERPL-SCNC: 24 MMOL/L (ref 20–31)
CREAT SERPL-MCNC: 0.8 MG/DL (ref 0.5–0.9)
EOSINOPHIL # BLD: 0.2 K/UL (ref 0–0.4)
EOSINOPHILS RELATIVE PERCENT: 6 % (ref 1–4)
ERYTHROCYTE [DISTWIDTH] IN BLOOD BY AUTOMATED COUNT: 13.1 % (ref 12.5–15.4)
GFR, ESTIMATED: 86 ML/MIN/1.73M2
GLUCOSE SERPL-MCNC: 172 MG/DL (ref 70–99)
HCT VFR BLD AUTO: 35.4 % (ref 36–46)
HGB BLD-MCNC: 12 G/DL (ref 12–16)
LYMPHOCYTES NFR BLD: 0.8 K/UL (ref 1–4.8)
LYMPHOCYTES RELATIVE PERCENT: 17 % (ref 24–44)
MCH RBC QN AUTO: 31.5 PG (ref 26–34)
MCHC RBC AUTO-ENTMCNC: 34 G/DL (ref 31–37)
MCV RBC AUTO: 92.7 FL (ref 80–100)
MONOCYTES NFR BLD: 0.2 K/UL (ref 0.1–1.2)
MONOCYTES NFR BLD: 4 % (ref 2–11)
NEUTROPHILS NFR BLD: 72 % (ref 36–66)
NEUTS SEG NFR BLD: 3.1 K/UL (ref 1.8–7.7)
PLATELET # BLD AUTO: 207 K/UL (ref 140–450)
PMV BLD AUTO: 7.4 FL (ref 6–12)
POTASSIUM SERPL-SCNC: 3.9 MMOL/L (ref 3.7–5.3)
PROT SERPL-MCNC: 7.5 G/DL (ref 6.4–8.3)
RBC # BLD AUTO: 3.82 M/UL (ref 4–5.2)
SODIUM SERPL-SCNC: 139 MMOL/L (ref 135–144)
WBC OTHER # BLD: 4.4 K/UL (ref 3.5–11)

## 2025-07-18 PROCEDURE — 36415 COLL VENOUS BLD VENIPUNCTURE: CPT

## 2025-07-18 PROCEDURE — 99212 OFFICE O/P EST SF 10 MIN: CPT | Performed by: RADIOLOGY

## 2025-07-18 PROCEDURE — 80053 COMPREHEN METABOLIC PANEL: CPT

## 2025-07-18 PROCEDURE — 85025 COMPLETE CBC W/AUTO DIFF WBC: CPT

## 2025-07-18 PROCEDURE — 96372 THER/PROPH/DIAG INJ SC/IM: CPT

## 2025-07-18 PROCEDURE — 6360000002 HC RX W HCPCS: Performed by: INTERNAL MEDICINE

## 2025-07-18 RX ORDER — SODIUM CHLORIDE 9 MG/ML
INJECTION, SOLUTION INTRAVENOUS CONTINUOUS
OUTPATIENT
Start: 2025-08-15

## 2025-07-18 RX ORDER — HYDROCORTISONE SODIUM SUCCINATE 100 MG/2ML
100 INJECTION INTRAMUSCULAR; INTRAVENOUS
OUTPATIENT
Start: 2025-08-15

## 2025-07-18 RX ORDER — ONDANSETRON 2 MG/ML
8 INJECTION INTRAMUSCULAR; INTRAVENOUS
OUTPATIENT
Start: 2025-08-15

## 2025-07-18 RX ORDER — LANREOTIDE ACETATE 120 MG/.5ML
120 INJECTION SUBCUTANEOUS ONCE
OUTPATIENT
Start: 2025-08-15 | End: 2025-08-15

## 2025-07-18 RX ORDER — ACETAMINOPHEN 325 MG/1
650 TABLET ORAL
OUTPATIENT
Start: 2025-08-15

## 2025-07-18 RX ORDER — FAMOTIDINE 10 MG/ML
20 INJECTION, SOLUTION INTRAVENOUS
OUTPATIENT
Start: 2025-08-15

## 2025-07-18 RX ORDER — ALBUTEROL SULFATE 90 UG/1
4 INHALANT RESPIRATORY (INHALATION) PRN
OUTPATIENT
Start: 2025-08-15

## 2025-07-18 RX ORDER — DIPHENHYDRAMINE HYDROCHLORIDE 50 MG/ML
50 INJECTION, SOLUTION INTRAMUSCULAR; INTRAVENOUS
OUTPATIENT
Start: 2025-08-15

## 2025-07-18 RX ORDER — EPINEPHRINE 1 MG/ML
0.3 INJECTION, SOLUTION, CONCENTRATE INTRAVENOUS PRN
OUTPATIENT
Start: 2025-08-15

## 2025-07-18 RX ORDER — LANREOTIDE ACETATE 120 MG/.5ML
120 INJECTION SUBCUTANEOUS ONCE
Status: COMPLETED | OUTPATIENT
Start: 2025-07-18 | End: 2025-07-18

## 2025-07-18 RX ADMIN — LANREOTIDE ACETATE 120 MG: 120 INJECTION SUBCUTANEOUS at 16:02

## 2025-07-18 NOTE — PROGRESS NOTES
[facility]            Radiation Oncology          [address] [place] [address]        O: [phone]        F: [phone]       [email or web address]           Date of Service: 2025     Location:  [facility] Radiation Oncology,   [address] [place] [id number]   [phone]       RADIATION ONCOLOGY FOLLOW UP NOTE    Patient ID:   Marilyn Campo  : 1970   MRN: [id number]    DIAGNOSIS:  Cancer Staging   No matching staging information was found for the patient.        INTERVAL HISTORY:   Marilyn Campo is a 55 y.o.. female with ***      AUA Score: ***    MEDICATIONS:    Current Outpatient Medications:     LANTUS SOLOSTAR 100 UNIT/ML injection pen, INJECT 20 UNITS UNDER THE SKIN DAILY, Disp: 15 mL, Rfl: 1    pantoprazole (PROTONIX) 40 MG tablet, Take 2 tablets by mouth 2 times daily (before meals), Disp: 60 tablet, Rfl: 5    glimepiride (AMARYL) 4 MG tablet, , Disp: , Rfl:     loratadine (CLARITIN) 10 MG tablet, Take 1 tablet by mouth daily, Disp: , Rfl:     oxybutynin (DITROPAN) 5 MG tablet, Take 1 tablet by mouth 2 times daily, Disp: , Rfl:     glipiZIDE (GLUCOTROL) 5 MG tablet, Take 1 tablet by mouth 2 times daily (before meals), Disp: 60 tablet, Rfl: 0    DULoxetine (CYMBALTA) 30 MG extended release capsule, Take 1 capsule by mouth daily, Disp: , Rfl:     aspirin 81 MG chewable tablet, Take 1 tablet by mouth daily, Disp: , Rfl:     lisinopril-hydroCHLOROthiazide (PRINZIDE;ZESTORETIC) 20-12.5 MG per tablet, take 1 tablet by mouth once daily (Patient not taking: Reported on 2025), Disp: , Rfl:     levothyroxine (SYNTHROID) 100 MCG tablet, Take 1 tablet by mouth Daily, Disp: , Rfl:     simvastatin (ZOCOR) 40 MG tablet, Take 1 tablet by mouth nightly, Disp: , Rfl:     acetaminophen (TYLENOL) 325 MG tablet, Take 2 tablets by mouth every 6 hours as needed for Pain, Disp: , Rfl:     benzonatate (TESSALON) 100 MG capsule, Take 1 capsule

## 2025-07-18 NOTE — PROGRESS NOTES
Marilyn Campo  7/18/2025  3:47 PM    Patient is here for follow up appointment. Dr Butterfield to [facility].  Vitals:    07/18/25 1546   BP: 135/83   Pulse: 72   Resp: 18   Temp: 97.6 °F (36.4 °C)   SpO2: 97%    :     Pain Assessment: None - Denies Pain          Wt Readings from Last 1 Encounters:   07/18/25 92.1 kg (203 lb 1.6 oz)                Current Outpatient Medications:     LANTUS SOLOSTAR 100 UNIT/ML injection pen, INJECT 20 UNITS UNDER THE SKIN DAILY, Disp: 15 mL, Rfl: 1    pantoprazole (PROTONIX) 40 MG tablet, Take 2 tablets by mouth 2 times daily (before meals), Disp: 60 tablet, Rfl: 5    glimepiride (AMARYL) 4 MG tablet, , Disp: , Rfl:     loratadine (CLARITIN) 10 MG tablet, Take 1 tablet by mouth daily, Disp: , Rfl:     oxybutynin (DITROPAN) 5 MG tablet, Take 1 tablet by mouth 2 times daily, Disp: , Rfl:     glipiZIDE (GLUCOTROL) 5 MG tablet, Take 1 tablet by mouth 2 times daily (before meals), Disp: 60 tablet, Rfl: 0    DULoxetine (CYMBALTA) 30 MG extended release capsule, Take 1 capsule by mouth daily, Disp: , Rfl:     aspirin 81 MG chewable tablet, Take 1 tablet by mouth daily, Disp: , Rfl:     lisinopril-hydroCHLOROthiazide (PRINZIDE;ZESTORETIC) 20-12.5 MG per tablet, take 1 tablet by mouth once daily (Patient not taking: Reported on 4/16/2025), Disp: , Rfl:     levothyroxine (SYNTHROID) 100 MCG tablet, Take 1 tablet by mouth Daily, Disp: , Rfl:     simvastatin (ZOCOR) 40 MG tablet, Take 1 tablet by mouth nightly, Disp: , Rfl:     acetaminophen (TYLENOL) 325 MG tablet, Take 2 tablets by mouth every 6 hours as needed for Pain, Disp: , Rfl:     benzonatate (TESSALON) 100 MG capsule, Take 1 capsule by mouth 3 times daily as needed for Cough (Patient not taking: Reported on 7/18/2025), Disp: 20 capsule, Rfl: 0  No current facility-administered medications for this encounter.    Facility-Administered Medications Ordered in Other Encounters:     lanreotide acetate (SOMATULINE) depot injection 120 mg, 120 mg,

## 2025-07-18 NOTE — TELEPHONE ENCOUNTER
Instructions   from Dr. Albaro Mahan MD    Tx as planned  Rv in 2 months with scan 1 week before rv     Tx as planned  CT 9/2/25 at 5pm at [facility]  RV 9/12/25 at 11 am

## 2025-07-18 NOTE — PROGRESS NOTES
Patient arrived for lanreotide injection.  Arrives ambulatory.  Denies any complaints.  Patient was seen by Dr. Mahan, order received to proceed with treatment.  Injection tolerated without incident.  Discharged in stable condition.  Returns 8/15/25 for next injection.

## 2025-07-19 NOTE — PROGRESS NOTES
Marilyn Campo                                                                                                                  7/18/2025  MRN:   [id number]  YOB: 1970  PCP:                           Mauri Harmon MD  Referring Physician: [place]  Treating Physician Name: ADAMA SCHWAB MD      Reason for visit:  Chief Complaint   Patient presents with    Follow-up     Review status of disease   Patient presents to the clinic for toxicity check and to discuss results of lab work-up, imaging studies and further treatment plan.    Current problems:  Well-differentiated, grade 1, pancreatic neuroendocrine tumor, clinical stage T2 N0 M0  Elevated gastrin (patient on Protonix)  SMA focal severe stenosis, chronic per CT scan  Multiple comorbidities including diabetes mellitus obesity, renal insufficiency and severe hepatic steatosis  Family history of leukemia     Active and recent treatments:  Patient medically unfit for surgery  Lanreotide-11/2022, ongoing  Status post SBRT-2/2025    Interim History:  History of Present Illness  The patient presents for treatment for patient's neuroendocrine tumor.  And lanreotide administration.     The patient presents with complaints of knee pain, weight loss, a pancreatic neoplasm, a pulmonary nodule, and requires medication management.    She reports experiencing severe knee pain, which necessitates the use of a wheelchair for ambulation, although she is able to navigate stairs within her home. She also notes increased fatigue.    The patient has experienced weight loss, which she attributes to dietary modifications, specifically the reduction of salt and sugar intake. She administers her cholesterol medication at night due to daytime somnolence and finds her antihypertensive medication beneficial. She has an upcoming appointment with her primary care physician on 07/31/2025. Her blood glucose levels remain stable.    The patient has a history of smoking, having  Statement Selected

## 2025-08-08 ENCOUNTER — TELEPHONE (OUTPATIENT)
Age: 55
End: 2025-08-08

## 2025-08-15 ENCOUNTER — HOSPITAL ENCOUNTER (OUTPATIENT)
Dept: INFUSION THERAPY | Age: 55
Discharge: HOME | End: 2025-08-15
Payer: MEDICARE

## 2025-08-15 ENCOUNTER — TELEPHONE (OUTPATIENT)
Age: 55
End: 2025-08-15

## 2025-08-15 DIAGNOSIS — R91.1 LUNG NODULE: ICD-10-CM

## 2025-08-15 DIAGNOSIS — Z79.899 HIGH RISK MEDICATION USE: ICD-10-CM

## 2025-08-15 DIAGNOSIS — D3A.8 PRIMARY PANCREATIC NEUROENDOCRINE TUMOR (HCC): Primary | ICD-10-CM

## 2025-08-15 LAB
ALBUMIN SERPL-MCNC: 4.2 G/DL (ref 3.5–5.2)
ALBUMIN/GLOB SERPL: 1.3 {RATIO} (ref 1–2.5)
ALP SERPL-CCNC: 164 U/L (ref 35–104)
ALT SERPL-CCNC: 40 U/L (ref 10–35)
ANION GAP SERPL CALCULATED.3IONS-SCNC: 12 MMOL/L (ref 9–16)
AST SERPL-CCNC: 37 U/L (ref 10–35)
BASOPHILS # BLD: 0 K/UL (ref 0–0.2)
BASOPHILS NFR BLD: 1 % (ref 0–2)
BILIRUB SERPL-MCNC: 0.6 MG/DL (ref 0–1.2)
BUN SERPL-MCNC: 17 MG/DL (ref 6–20)
CALCIUM SERPL-MCNC: 9.3 MG/DL (ref 8.6–10.4)
CHLORIDE SERPL-SCNC: 104 MMOL/L (ref 98–107)
CO2 SERPL-SCNC: 22 MMOL/L (ref 20–31)
CREAT SERPL-MCNC: 0.9 MG/DL (ref 0.6–0.9)
EOSINOPHIL # BLD: 0.2 K/UL (ref 0–0.4)
EOSINOPHILS RELATIVE PERCENT: 6 % (ref 1–4)
ERYTHROCYTE [DISTWIDTH] IN BLOOD BY AUTOMATED COUNT: 12.8 % (ref 12.5–15.4)
GFR, ESTIMATED: 75 ML/MIN/1.73M2
GLUCOSE SERPL-MCNC: 187 MG/DL (ref 74–99)
HCT VFR BLD AUTO: 35.4 % (ref 36–46)
HGB BLD-MCNC: 11.8 G/DL (ref 12–16)
LYMPHOCYTES NFR BLD: 0.7 K/UL (ref 1–4.8)
LYMPHOCYTES RELATIVE PERCENT: 19 % (ref 24–44)
MCH RBC QN AUTO: 31 PG (ref 26–34)
MCHC RBC AUTO-ENTMCNC: 33.4 G/DL (ref 31–37)
MCV RBC AUTO: 93 FL (ref 80–100)
MONOCYTES NFR BLD: 0.2 K/UL (ref 0.1–1.2)
MONOCYTES NFR BLD: 6 % (ref 2–11)
NEUTROPHILS NFR BLD: 68 % (ref 36–66)
NEUTS SEG NFR BLD: 2.4 K/UL (ref 1.8–7.7)
PLATELET # BLD AUTO: 226 K/UL (ref 140–450)
PMV BLD AUTO: 7.1 FL (ref 6–12)
POTASSIUM SERPL-SCNC: 3.7 MMOL/L (ref 3.7–5.3)
PROT SERPL-MCNC: 7.4 G/DL (ref 6.6–8.7)
RBC # BLD AUTO: 3.8 M/UL (ref 4–5.2)
SODIUM SERPL-SCNC: 138 MMOL/L (ref 136–145)
WBC OTHER # BLD: 3.6 K/UL (ref 3.5–11)

## 2025-08-15 PROCEDURE — 80053 COMPREHEN METABOLIC PANEL: CPT

## 2025-08-15 PROCEDURE — 36415 COLL VENOUS BLD VENIPUNCTURE: CPT

## 2025-08-15 PROCEDURE — 6360000002 HC RX W HCPCS: Performed by: INTERNAL MEDICINE

## 2025-08-15 PROCEDURE — 85025 COMPLETE CBC W/AUTO DIFF WBC: CPT

## 2025-08-15 PROCEDURE — 96372 THER/PROPH/DIAG INJ SC/IM: CPT

## 2025-08-15 RX ORDER — LANREOTIDE ACETATE 120 MG/.5ML
120 INJECTION SUBCUTANEOUS ONCE
Status: COMPLETED | OUTPATIENT
Start: 2025-08-15 | End: 2025-08-15

## 2025-08-15 RX ORDER — DIPHENHYDRAMINE HYDROCHLORIDE 50 MG/ML
50 INJECTION, SOLUTION INTRAMUSCULAR; INTRAVENOUS
OUTPATIENT
Start: 2025-09-12

## 2025-08-15 RX ORDER — HYDROCORTISONE SODIUM SUCCINATE 100 MG/2ML
100 INJECTION INTRAMUSCULAR; INTRAVENOUS
OUTPATIENT
Start: 2025-09-12

## 2025-08-15 RX ORDER — SODIUM CHLORIDE 9 MG/ML
INJECTION, SOLUTION INTRAVENOUS CONTINUOUS
OUTPATIENT
Start: 2025-09-12

## 2025-08-15 RX ORDER — ALBUTEROL SULFATE 90 UG/1
4 INHALANT RESPIRATORY (INHALATION) PRN
OUTPATIENT
Start: 2025-09-12

## 2025-08-15 RX ORDER — FAMOTIDINE 10 MG/ML
20 INJECTION, SOLUTION INTRAVENOUS
OUTPATIENT
Start: 2025-09-12

## 2025-08-15 RX ORDER — ACETAMINOPHEN 325 MG/1
650 TABLET ORAL
OUTPATIENT
Start: 2025-09-12

## 2025-08-15 RX ORDER — ONDANSETRON 2 MG/ML
8 INJECTION INTRAMUSCULAR; INTRAVENOUS
OUTPATIENT
Start: 2025-09-12

## 2025-08-15 RX ORDER — EPINEPHRINE 1 MG/ML
0.3 INJECTION, SOLUTION, CONCENTRATE INTRAVENOUS PRN
OUTPATIENT
Start: 2025-09-12

## 2025-08-15 RX ORDER — LANREOTIDE ACETATE 120 MG/.5ML
120 INJECTION SUBCUTANEOUS ONCE
OUTPATIENT
Start: 2025-09-12 | End: 2025-09-12

## 2025-08-15 RX ADMIN — LANREOTIDE ACETATE 120 MG: 120 INJECTION SUBCUTANEOUS at 15:02

## 2025-08-26 ENCOUNTER — TELEPHONE (OUTPATIENT)
Age: 55
End: 2025-08-26

## 2025-09-05 ENCOUNTER — TELEPHONE (OUTPATIENT)
Age: 55
End: 2025-09-05

## (undated) DEVICE — TRAP SPEC RETRV CLR PLAS POLYP IN LN SUCT QUIK CTCH

## (undated) DEVICE — SNARE ENDOSCP L240CM SHTH DIA24MM LOOP W10MM POLYP RND REINF

## (undated) DEVICE — FORCEPS BX L240CM WRK CHN 2.8MM STD CAP W/ NDL MIC MESH

## (undated) DEVICE — CLIP HEMO L235CM WRK CHN DIA OPN 17MM BRAID CATH ROT

## (undated) DEVICE — SYSTEM BX DIA22GA FN W/ PRE LD NDL SHARKCORE